# Patient Record
Sex: FEMALE | Race: WHITE | NOT HISPANIC OR LATINO | Employment: OTHER | URBAN - NONMETROPOLITAN AREA
[De-identification: names, ages, dates, MRNs, and addresses within clinical notes are randomized per-mention and may not be internally consistent; named-entity substitution may affect disease eponyms.]

---

## 2017-01-31 ENCOUNTER — TRANSCRIBE ORDERS (OUTPATIENT)
Dept: CT IMAGING | Facility: HOSPITAL | Age: 77
End: 2017-01-31

## 2017-01-31 DIAGNOSIS — Z13.9 SCREENING: Primary | ICD-10-CM

## 2017-03-20 ENCOUNTER — APPOINTMENT (OUTPATIENT)
Dept: CT IMAGING | Facility: HOSPITAL | Age: 77
End: 2017-03-20

## 2017-03-21 ENCOUNTER — TRANSCRIBE ORDERS (OUTPATIENT)
Dept: MRI IMAGING | Facility: HOSPITAL | Age: 77
End: 2017-03-21

## 2017-03-21 DIAGNOSIS — H49.02 THIRD NERVE PALSY OF LEFT EYE: Primary | ICD-10-CM

## 2017-03-27 ENCOUNTER — HOSPITAL ENCOUNTER (OUTPATIENT)
Dept: MRI IMAGING | Facility: HOSPITAL | Age: 77
Discharge: HOME | End: 2017-03-27
Attending: SPECIALIST | Admitting: SPECIALIST

## 2017-03-27 DIAGNOSIS — H49.02 THIRD NERVE PALSY OF LEFT EYE: ICD-10-CM

## 2017-03-27 PROCEDURE — 70553 MRI BRAIN STEM W/O & W/DYE: CPT

## 2017-03-27 PROCEDURE — 0 GADOBENATE DIMEGLUMINE 529 MG/ML SOLUTION: Performed by: SPECIALIST

## 2017-03-27 PROCEDURE — A9577 INJ MULTIHANCE: HCPCS | Performed by: SPECIALIST

## 2017-03-27 RX ADMIN — GADOBENATE DIMEGLUMINE 10 ML: 529 INJECTION, SOLUTION INTRAVENOUS at 13:54

## 2017-09-05 ENCOUNTER — TRANSCRIBE ORDERS (OUTPATIENT)
Dept: ADMINISTRATIVE | Facility: HOSPITAL | Age: 77
End: 2017-09-05

## 2017-09-05 DIAGNOSIS — Z78.0 ASYMPTOMATIC POSTMENOPAUSAL STATUS (AGE-RELATED) (NATURAL): Primary | ICD-10-CM

## 2017-09-07 ENCOUNTER — TRANSCRIBE ORDERS (OUTPATIENT)
Dept: ADMINISTRATIVE | Facility: HOSPITAL | Age: 77
End: 2017-09-07

## 2018-12-19 ENCOUNTER — TRANSCRIBE ORDERS (OUTPATIENT)
Dept: ADMINISTRATIVE | Facility: HOSPITAL | Age: 78
End: 2018-12-19

## 2018-12-19 DIAGNOSIS — Z78.0 POST-MENOPAUSAL: Primary | ICD-10-CM

## 2018-12-19 DIAGNOSIS — Z12.31 ENCOUNTER FOR SCREENING MAMMOGRAM FOR BREAST CANCER: ICD-10-CM

## 2019-02-25 ENCOUNTER — HOSPITAL ENCOUNTER (OUTPATIENT)
Dept: ULTRASOUND IMAGING | Facility: HOSPITAL | Age: 79
Discharge: HOME | End: 2019-02-25
Admitting: INTERNAL MEDICINE

## 2019-02-25 ENCOUNTER — TRANSCRIBE ORDERS (OUTPATIENT)
Dept: ADMINISTRATIVE | Facility: HOSPITAL | Age: 79
End: 2019-02-25

## 2019-02-25 DIAGNOSIS — M79.89 SWELLING, LIMB: ICD-10-CM

## 2019-02-25 DIAGNOSIS — M79.89 SWELLING, LIMB: Primary | ICD-10-CM

## 2019-02-25 PROCEDURE — 93970 EXTREMITY STUDY: CPT

## 2022-06-30 ENCOUNTER — OFFICE VISIT (OUTPATIENT)
Dept: PULMONOLOGY | Facility: CLINIC | Age: 82
End: 2022-06-30

## 2022-06-30 VITALS
DIASTOLIC BLOOD PRESSURE: 80 MMHG | BODY MASS INDEX: 15.33 KG/M2 | WEIGHT: 92 LBS | SYSTOLIC BLOOD PRESSURE: 119 MMHG | RESPIRATION RATE: 16 BRPM | OXYGEN SATURATION: 93 % | HEIGHT: 65 IN | HEART RATE: 64 BPM

## 2022-06-30 DIAGNOSIS — R06.02 SHORTNESS OF BREATH: ICD-10-CM

## 2022-06-30 DIAGNOSIS — J41.0 CHRONIC BRONCHITIS, SIMPLE: ICD-10-CM

## 2022-06-30 DIAGNOSIS — J44.9 CHRONIC OBSTRUCTIVE PULMONARY DISEASE, UNSPECIFIED COPD TYPE: ICD-10-CM

## 2022-06-30 DIAGNOSIS — F17.210 NICOTINE DEPENDENCE, CIGARETTES, UNCOMPLICATED: ICD-10-CM

## 2022-06-30 DIAGNOSIS — J47.9 BRONCHIECTASIS WITHOUT ACUTE EXACERBATION: ICD-10-CM

## 2022-06-30 DIAGNOSIS — R93.89 ABNORMAL CT OF THE CHEST: Primary | ICD-10-CM

## 2022-06-30 DIAGNOSIS — R06.02 SOB (SHORTNESS OF BREATH): Primary | ICD-10-CM

## 2022-06-30 PROCEDURE — 99204 OFFICE O/P NEW MOD 45 MIN: CPT | Performed by: INTERNAL MEDICINE

## 2022-06-30 PROCEDURE — 94060 EVALUATION OF WHEEZING: CPT | Performed by: INTERNAL MEDICINE

## 2022-06-30 PROCEDURE — 94729 DIFFUSING CAPACITY: CPT | Performed by: INTERNAL MEDICINE

## 2022-06-30 PROCEDURE — 95012 NITRIC OXIDE EXP GAS DETER: CPT | Performed by: INTERNAL MEDICINE

## 2022-06-30 PROCEDURE — 94726 PLETHYSMOGRAPHY LUNG VOLUMES: CPT | Performed by: INTERNAL MEDICINE

## 2022-06-30 RX ORDER — DOXAZOSIN 2 MG/1
TABLET ORAL
COMMUNITY

## 2022-06-30 RX ORDER — SIMVASTATIN 40 MG
1 TABLET ORAL
COMMUNITY
Start: 2022-04-14

## 2022-06-30 RX ORDER — ALBUTEROL SULFATE 90 UG/1
AEROSOL, METERED RESPIRATORY (INHALATION)
COMMUNITY
End: 2022-06-30

## 2022-06-30 RX ORDER — BUDESONIDE 0.5 MG/2ML
0.5 INHALANT ORAL 2 TIMES DAILY
Qty: 120 ML | Refills: 5 | Status: SHIPPED | OUTPATIENT
Start: 2022-06-30

## 2022-06-30 RX ORDER — AMLODIPINE BESYLATE 2.5 MG/1
TABLET ORAL
COMMUNITY
Start: 2022-04-26

## 2022-06-30 RX ORDER — LOSARTAN POTASSIUM 50 MG/1
TABLET ORAL
COMMUNITY
Start: 2022-04-06

## 2022-06-30 RX ORDER — DOXYCYCLINE 100 MG/1
100 CAPSULE ORAL 2 TIMES DAILY
COMMUNITY
Start: 2022-05-31 | End: 2022-09-02

## 2022-06-30 RX ORDER — BENZONATATE 200 MG/1
200 CAPSULE ORAL 3 TIMES DAILY PRN
COMMUNITY
Start: 2022-05-31

## 2022-06-30 RX ORDER — SOFT LENS DISINFECTANT
1 SOLUTION, NON-ORAL MISCELLANEOUS TAKE AS DIRECTED
Qty: 1 EACH | Refills: 0 | Status: SHIPPED | OUTPATIENT
Start: 2022-06-30

## 2022-06-30 RX ORDER — BUSPIRONE HYDROCHLORIDE 10 MG/1
TABLET ORAL
COMMUNITY
Start: 2022-02-18

## 2022-06-30 RX ORDER — IPRATROPIUM BROMIDE AND ALBUTEROL SULFATE 2.5; .5 MG/3ML; MG/3ML
3 SOLUTION RESPIRATORY (INHALATION) 4 TIMES DAILY PRN
Qty: 360 ML | Refills: 5 | Status: SHIPPED | OUTPATIENT
Start: 2022-06-30

## 2022-06-30 RX ORDER — CILOSTAZOL 50 MG/1
1 TABLET ORAL 2 TIMES DAILY
COMMUNITY
Start: 2022-02-14

## 2022-06-30 RX ORDER — METHYLPREDNISOLONE 4 MG/1
TABLET ORAL
COMMUNITY
Start: 2022-05-31 | End: 2022-09-02

## 2022-06-30 NOTE — PROGRESS NOTES
"  CONSULT NOTE    Requested by:   Leola Wright MD Higgins, Amy Martin, MD      Chief Complaint   Patient presents with   • Consult   • Breathing Problem       Subjective:  Tracey Nelson is a 81 y.o. female.     History of Present Illness   Patient comes in today for evaluation of shortness of breath. Patient says that for the past few years, she has been noticing that her exercise tolerance has been declining.     Patient also complains of some cough and phlegm production that occurs on most mornings out of the week, for most weeks out of the month, for the past few years.  She says that occasionally the sputum is extremely \"sticky\".  She however, denies having recurrent pneumonias although does remember having pneumonias in the past.    Patient used to smoke 1 pack per day for the past 40-45 years and is currently smoking 1 pack a day.     she mentions a few recent prescriptions for antibiotics / steroids for upper respiratory tract infection.     she is currently not on oxygen.     she does have a family history of chronic obstructive disease, in her sister who had lung cancer.     The following portions of the patient's history were reviewed and updated as appropriate: allergies, current medications, past family history, past medical history, past social history and past surgical history.    Review of Systems   HENT: Positive for sinus pressure.    Respiratory: Positive for shortness of breath.    Cardiovascular: Negative for palpitations.   Psychiatric/Behavioral: Positive for sleep disturbance.   All other systems reviewed and are negative.      Past Medical History:   Diagnosis Date   • Allergies    • Coronary artery disease    • Hyperlipidemia        Social History     Tobacco Use   • Smoking status: Current Some Day Smoker     Packs/day: 0.50     Years: 40.00     Pack years: 20.00     Types: Cigarettes   • Smokeless tobacco: Never Used   Substance Use Topics   • Alcohol use: Never " "        Objective:  Visit Vitals  /80   Pulse 64   Resp 16   Ht 165.1 cm (65\")   Wt 41.7 kg (92 lb)   SpO2 93%   BMI 15.31 kg/m²       Physical Exam  Vitals reviewed.   Constitutional:       Appearance: She is well-developed.   HENT:      Head:      Comments: No acute lesions noted     Nose:      Comments: Mild nasal erythema noted.     Mouth/Throat:      Mouth: Mucous membranes are moist.      Comments: Dentures noted.   Neck:      Vascular: No JVD.   Cardiovascular:      Rate and Rhythm: Normal rate and regular rhythm.   Pulmonary:      Effort: Pulmonary effort is normal.      Breath sounds: Wheezing and rhonchi (Left > right. ) present. No rales.      Comments: Somewhat hyperresonant to percussion.  Somewhat decreased air entry.  Mild scattered wheezing noted.   Musculoskeletal:      Cervical back: Neck supple.      Right lower leg: No edema.      Left lower leg: No edema.      Comments: Gait was normal.   Skin:     General: Skin is warm and dry.   Neurological:      Mental Status: She is alert and oriented to person, place, and time.   Psychiatric:         Mood and Affect: Mood normal.         Behavior: Behavior normal.         Assessment/Plan:  Diagnoses and all orders for this visit:    1. Abnormal CT of the chest (Primary)    2. Shortness of breath  -     Pulmonary Function Test  -     Nitric Oxide  -     CT Chest Hi Resolution Diagnostic; Future    3. Chronic obstructive pulmonary disease, unspecified COPD type (HCC)  -     Pulmonary Function Test  -     Nitric Oxide  -     CT Chest Hi Resolution Diagnostic; Future    4. Chronic bronchitis, simple (HCC)  -     Pulmonary Function Test  -     Nitric Oxide    5. Nicotine dependence, cigarettes, uncomplicated    6. Bronchiectasis without acute exacerbation (HCC)  -     CT Chest Hi Resolution Diagnostic; Future    Other orders  -     Nebulizer device; 1 Device Take As Directed.  Dispense: 1 each; Refill: 0  -     budesonide (Pulmicort) 0.5 MG/2ML nebulizer " solution; Take 2 mL by nebulization 2 (Two) Times a Day.  Dispense: 120 mL; Refill: 5  -     ipratropium-albuterol (DUO-NEB) 0.5-2.5 mg/3 ml nebulizer; Take 3 mL by nebulization 4 (Four) Times a Day As Needed for Wheezing or Shortness of Air.  Dispense: 360 mL; Refill: 5        Return in about 2 months (around 8/31/2022) for Recheck, Imaging, For Sonya Garcia), ...Also 6 mths w/Dee Dee, ....Also 11 mths w/ Dr. Ramirez.    DISCUSSION(if any):  Last CT scan was reviewed in great detail with the patient. Images reviewed personally.   CT of the chest was suggestive of left basilar bronchiectasis.  There was some atelectasis noted as well.    I have also reviewed her provider's office note that mentions dry cough, postnasal drip and some wheezing.     ===========================  ===========================    PFTs were reviewed.  Consistent with moderate obstruction and mild reduction in diffusion capacity.    FeNO level was <5 today.    Laboratory workup was also reviewed which showed carbon oxide level of 26.  Hemoglobin was 14.3 with absolute reasonable count of 100 or so.    ===========================  ===========================    Orders as above.    I have told the patient, that in my view, shortness of breath is most likely from underlying chronic obstructive lung disease/bronchiectasis.     Patient was given education and demonstration on how to use the medicine.     Side effects, of prescribed medicines, discussed.    Patient was also instructed on compliance and adherence with instructions.     I have discussed the need to quit smoking as soon as possible.    Patient was offered modalities such as Chantix/nicotine patches/Wellbutrin to aid in smoking cessation.    The patient will get back to us regarding the choice, once a decision has been taken.     I told the patient that her CT findings are suggestive of bronchiectasis therefore high-resolution CT scan has been requested.      I have given her  prescription for incentive spirometer with instructions to use it on a regular basis.    Postural drainage techniques were also discussed with the patient.    Based on the findings on the HRCT, we will consider further work-up, if clinically indicated.      I told the patient that given isolated left lower lobe bronchiectasis, bronchoscopy may need to be considered to rule out focal/local atypical infections as a cause.    The patient was extremely hesitant but was willing to discuss further after the results of HRCT are available.    Patient was asked to call with any concerns.     Patient will be followed clinically to assess for response to treatment and further recommendations will be made, based on response.        Dictated utilizing Dragon dictation.    This document was electronically signed by Valerie Ramirez MD on 06/30/22 at 14:46 EDT

## 2022-07-18 ENCOUNTER — HOSPITAL ENCOUNTER (OUTPATIENT)
Dept: CT IMAGING | Facility: HOSPITAL | Age: 82
Discharge: HOME | End: 2022-07-18
Admitting: INTERNAL MEDICINE

## 2022-07-18 DIAGNOSIS — J47.9 BRONCHIECTASIS WITHOUT ACUTE EXACERBATION: ICD-10-CM

## 2022-07-18 DIAGNOSIS — R06.02 SHORTNESS OF BREATH: ICD-10-CM

## 2022-07-18 DIAGNOSIS — J44.9 CHRONIC OBSTRUCTIVE PULMONARY DISEASE, UNSPECIFIED COPD TYPE: ICD-10-CM

## 2022-07-18 PROCEDURE — 71250 CT THORAX DX C-: CPT

## 2022-08-02 DIAGNOSIS — R93.89 ABNORMAL CT OF THE CHEST: Primary | ICD-10-CM

## 2022-08-17 ENCOUNTER — HOSPITAL ENCOUNTER (OUTPATIENT)
Dept: CT IMAGING | Facility: HOSPITAL | Age: 82
Discharge: HOME | End: 2022-08-17
Admitting: INTERNAL MEDICINE

## 2022-08-17 DIAGNOSIS — R93.89 ABNORMAL CT OF THE CHEST: ICD-10-CM

## 2022-08-17 PROCEDURE — 71250 CT THORAX DX C-: CPT

## 2022-09-02 ENCOUNTER — OFFICE VISIT (OUTPATIENT)
Dept: PULMONOLOGY | Facility: CLINIC | Age: 82
End: 2022-09-02

## 2022-09-02 VITALS
HEART RATE: 71 BPM | DIASTOLIC BLOOD PRESSURE: 52 MMHG | OXYGEN SATURATION: 96 % | SYSTOLIC BLOOD PRESSURE: 116 MMHG | HEIGHT: 65 IN | BODY MASS INDEX: 15.16 KG/M2 | WEIGHT: 91 LBS

## 2022-09-02 DIAGNOSIS — R05.3 CHRONIC COUGH: ICD-10-CM

## 2022-09-02 DIAGNOSIS — R93.89 ABNORMAL CT OF THE CHEST: ICD-10-CM

## 2022-09-02 DIAGNOSIS — J44.9 CHRONIC OBSTRUCTIVE PULMONARY DISEASE, UNSPECIFIED COPD TYPE: Primary | ICD-10-CM

## 2022-09-02 PROCEDURE — 99214 OFFICE O/P EST MOD 30 MIN: CPT | Performed by: NURSE PRACTITIONER

## 2022-09-02 RX ORDER — MIRTAZAPINE 15 MG/1
TABLET, FILM COATED ORAL
COMMUNITY
Start: 2022-08-11

## 2022-09-02 RX ORDER — BUDESONIDE 0.5 MG/2ML
2 INHALANT ORAL EVERY 12 HOURS SCHEDULED
COMMUNITY

## 2022-09-02 NOTE — PROGRESS NOTES
"     Follow Up Office Visit      Patient Name: Tracey Nelson    Chief Complaint:    Chief Complaint   Patient presents with   • Follow-up   • Shortness of Breath     Patient states she has had a cough that has been persistent for a couple of months        History of Present Illness: Tracey Nelson is a 81 y.o. female who is here today for follow up of COPD. Since last visit, her dyspnea has been stable and is not lifestyle limiting for her.  She notes that she is able to do yard work and cleaning her house without difficulty.  She is most bothered by a cough for 6 months duration, which is worse at nighttime and is productive of light yellow sputum.  No difficulty in clearing secretions.  No fevers, no hemoptysis.  She is still smoking some and has been thinking about quitting. She notes that her weight has dwindled from 130s lb over the past 3-4 years. +fatigue - her cough \"takes everything out\" of her. She declines to consider bronchoscopy at this time.    Supplemental Oxygen: No    Subjective      Review of Systems:  Review of Systems   Constitutional: Positive for fatigue and unexpected weight change. Negative for fever.   Respiratory: Positive for cough and shortness of breath. Negative for wheezing.    Cardiovascular: Negative for chest pain and leg swelling.        Past Medical History:   Past Medical History:   Diagnosis Date   • Allergies    • Coronary artery disease    • Hyperlipidemia        Past Surgical History:   Past Surgical History:   Procedure Laterality Date   • CAROTID STENT     • TONSILLECTOMY         Family History: History reviewed. No pertinent family history.    Social History:   Social History     Socioeconomic History   • Marital status:    Tobacco Use   • Smoking status: Current Some Day Smoker     Packs/day: 0.50     Years: 40.00     Pack years: 20.00     Types: Cigarettes   • Smokeless tobacco: Never Used   Vaping Use   • Vaping Use: Never used   Substance and Sexual Activity "   • Alcohol use: Never       Current Medications:     Current Outpatient Medications:   •  amLODIPine (NORVASC) 2.5 MG tablet, amlodipine 2.5 mg tablet  Take 1 tablet every day by oral route in the evening., Disp: , Rfl:   •  Aspirin 81 MG capsule, aspirin 80 mg tablet  Take 1 tablet every day by oral route., Disp: , Rfl:   •  azithromycin (ZITHROMAX) 250 MG tablet, Take 2 tablets the first day then 1 tablet a day for 4 days thereafter., Disp: 6 tablet, Rfl: 0  •  benzonatate (TESSALON) 200 MG capsule, Take 200 mg by mouth 3 (Three) Times a Day As Needed., Disp: , Rfl:   •  budesonide (Pulmicort) 0.5 MG/2ML nebulizer solution, Take 2 mL by nebulization 2 (Two) Times a Day., Disp: 120 mL, Rfl: 5  •  budesonide (PULMICORT) 0.5 MG/2ML nebulizer solution, 2 mL Every 12 (Twelve) Hours., Disp: , Rfl:   •  busPIRone (BUSPAR) 10 MG tablet, buspirone 10 mg tablet  Two times a day as needed, Disp: , Rfl:   •  Cholecalciferol 50 MCG (2000 UT) tablet, Vitamin D3 50 mcg (2,000 unit) tablet  Take 1 tablet every day by oral route for 30 days., Disp: , Rfl:   •  cilostazol (PLETAL) 50 MG tablet, cilostazol 50 mg tablet  To take 1 tablet BID, Disp: , Rfl:   •  cilostazol (PLETAL) 50 MG tablet, Take 1 tablet by mouth 2 (Two) Times a Day., Disp: , Rfl:   •  cyanocobalamin (VITAMIN B-12) 250 MCG tablet, Vitamin B-12 250 mcg tablet  Take 1 Daily, Disp: , Rfl:   •  doxazosin (CARDURA) 2 MG tablet, Take 1 tablet(s) every day by oral route for 90 days., Disp: , Rfl:   •  ipratropium-albuterol (DUO-NEB) 0.5-2.5 mg/3 ml nebulizer, Take 3 mL by nebulization 4 (Four) Times a Day As Needed for Wheezing or Shortness of Air., Disp: 360 mL, Rfl: 5  •  lisinopril (PRINIVIL,ZESTRIL) 30 MG tablet, , Disp: , Rfl:   •  losartan (COZAAR) 50 MG tablet, losartan 50 mg tablet  Take 1 tablet every day by oral route for 90 days., Disp: , Rfl:   •  mirtazapine (REMERON) 15 MG tablet, , Disp: , Rfl:   •  Nebulizer device, 1 Device Take As Directed., Disp: 1  "each, Rfl: 0  •  simvastatin (ZOCOR) 40 MG tablet, Take 1 tablet by mouth every night at bedtime., Disp: , Rfl:   •  vitamin B-12 (CYANOCOBALAMIN) 100 MCG tablet, Vitamin B-12 250 mcg tablet  Take 1 Daily, Disp: , Rfl:   •  Cholecalciferol (Vitamin D3) 125 MCG (5000 UT) tablet dispersible, Vitamin D3 2,000 unit tablet  Take 1 tablet every day by oral route for 30 days., Disp: , Rfl:   •  simvastatin (ZOCOR) 40 MG tablet, , Disp: , Rfl:      Allergies:   No Known Allergies    Objective     Physical Exam:  Vital Signs:   Vitals:    09/02/22 1308   BP: 116/52   Pulse: 71   SpO2: 96%   Weight: 41.3 kg (91 lb)   Height: 165.1 cm (65\")     Body mass index is 15.14 kg/m².    Physical Exam  Constitutional:       General: She is not in acute distress.     Appearance: She is not toxic-appearing.      Comments: Underweight   HENT:      Head: Normocephalic and atraumatic.   Eyes:      Extraocular Movements: Extraocular movements intact.   Cardiovascular:      Rate and Rhythm: Normal rate and regular rhythm.      Heart sounds: Normal heart sounds.   Pulmonary:      Effort: Pulmonary effort is normal.      Breath sounds: Normal breath sounds.   Abdominal:      General: There is no distension.   Musculoskeletal:         General: No swelling.      Cervical back: Neck supple.   Skin:     General: Skin is warm and dry.      Findings: No rash.   Neurological:      General: No focal deficit present.      Mental Status: She is alert and oriented to person, place, and time.   Psychiatric:         Mood and Affect: Mood normal.         Behavior: Behavior normal.       Results Review:   July 2022 high resolution chest CT scan showed no evidence of diffuse interstitial lung disease. No evidence of bronchiectasis. Left lower lobe parenchymal opacity could be neoplastic or inflammatory.    August 2022 chest CT scan showed persistent parenchymal opacity of the left lower lobe which could be due to a postobstructive process given opacification " of the  left lower lobe bronchus. There is likely postobstructive pneumonia, mildly improved.    Assessment / Plan      Assessment/Plan:   Diagnoses and all orders for this visit:    1. Chronic obstructive pulmonary disease, unspecified COPD type (HCC) (Primary)  Currently using nebulizer treatments. Will have her trial sample inhalers of Anoro in place of nebs for the duration of the samples and then can resume her usual maintenance neb regimen as she has a couple of boxes neb medication at home. Will determine at next visit if patient would like to try transitioning from neb treatments to Anoro or other similar inhaler. Appropriate inhaler technique demonstrated today in clinic.     2. Abnormal CT of the chest  LLL abnormality which appears overall stable between recent imaging reports. More remote reports have been requested to assess duration of the abnormality. Discussed bronchoscopy for further evaluation though patient declines to consider a scope at this time.    3. Chronic cough  -     Respiratory Culture - Sputum, Cough; Future  -     AFB Culture - Sputum, Cough; Future  -     Fungus Culture - Sputum, Cough; Future  ?MAC given constellation of symptoms. Check sputum studies as noted above.     Follow Up:   January 2023 as previously scheduled  The patient was counseled on diagnostic results, risks and benefits of treatment options, risk factor modifications and the importance of treatment compliance. The patient was advised to contact the clinic with concerns or worsening symptoms.     KADE Powers   Pulmonary Medicine [place]     Please note that portions of this note may have been completed with a voice recognition program. Efforts were made to edit the dictations, but occasionally words are mistranscribed

## 2022-10-16 ENCOUNTER — APPOINTMENT (OUTPATIENT)
Dept: CT IMAGING | Facility: HOSPITAL | Age: 82
End: 2022-10-16

## 2022-10-16 ENCOUNTER — APPOINTMENT (OUTPATIENT)
Dept: GENERAL RADIOLOGY | Facility: HOSPITAL | Age: 82
End: 2022-10-16

## 2022-10-16 ENCOUNTER — HOSPITAL ENCOUNTER (EMERGENCY)
Facility: HOSPITAL | Age: 82
Discharge: HOME | End: 2022-10-16
Attending: FAMILY MEDICINE | Admitting: FAMILY MEDICINE

## 2022-10-16 VITALS
HEIGHT: 61 IN | TEMPERATURE: 97.7 F | DIASTOLIC BLOOD PRESSURE: 73 MMHG | HEART RATE: 85 BPM | OXYGEN SATURATION: 93 % | WEIGHT: 95 LBS | BODY MASS INDEX: 17.94 KG/M2 | SYSTOLIC BLOOD PRESSURE: 156 MMHG | RESPIRATION RATE: 18 BRPM

## 2022-10-16 DIAGNOSIS — I10 UNCONTROLLED HYPERTENSION: Primary | ICD-10-CM

## 2022-10-16 LAB
ALBUMIN SERPL-MCNC: 4.2 G/DL (ref 3.5–5.2)
ALBUMIN/GLOB SERPL: 1.6 G/DL
ALP SERPL-CCNC: 76 U/L (ref 39–117)
ALT SERPL W P-5'-P-CCNC: 10 U/L (ref 1–33)
ANION GAP SERPL CALCULATED.3IONS-SCNC: 8 MMOL/L (ref 5–15)
AST SERPL-CCNC: 17 U/L (ref 1–32)
BASOPHILS # BLD AUTO: 0.06 10*3/MM3 (ref 0–0.2)
BASOPHILS NFR BLD AUTO: 0.7 % (ref 0–1.5)
BILIRUB SERPL-MCNC: 0.2 MG/DL (ref 0–1.2)
BILIRUB UR QL STRIP: NEGATIVE
BUN SERPL-MCNC: 14 MG/DL (ref 8–23)
BUN/CREAT SERPL: 16.7 (ref 7–25)
CALCIUM SPEC-SCNC: 10.1 MG/DL (ref 8.6–10.5)
CHLORIDE SERPL-SCNC: 102 MMOL/L (ref 98–107)
CLARITY UR: CLEAR
CO2 SERPL-SCNC: 28 MMOL/L (ref 22–29)
COLOR UR: YELLOW
CREAT SERPL-MCNC: 0.84 MG/DL (ref 0.57–1)
CRP SERPL-MCNC: <0.3 MG/DL (ref 0–0.5)
DEPRECATED RDW RBC AUTO: 47 FL (ref 37–54)
EGFRCR SERPLBLD CKD-EPI 2021: 69.5 ML/MIN/1.73
EOSINOPHIL # BLD AUTO: 0.16 10*3/MM3 (ref 0–0.4)
EOSINOPHIL NFR BLD AUTO: 2 % (ref 0.3–6.2)
ERYTHROCYTE [DISTWIDTH] IN BLOOD BY AUTOMATED COUNT: 13 % (ref 12.3–15.4)
GLOBULIN UR ELPH-MCNC: 2.6 GM/DL
GLUCOSE SERPL-MCNC: 123 MG/DL (ref 65–99)
GLUCOSE UR STRIP-MCNC: NEGATIVE MG/DL
HCT VFR BLD AUTO: 42.3 % (ref 34–46.6)
HGB BLD-MCNC: 13.9 G/DL (ref 12–15.9)
HGB UR QL STRIP.AUTO: NEGATIVE
HOLD SPECIMEN: NORMAL
HOLD SPECIMEN: NORMAL
IMM GRANULOCYTES # BLD AUTO: 0.01 10*3/MM3 (ref 0–0.05)
IMM GRANULOCYTES NFR BLD AUTO: 0.1 % (ref 0–0.5)
KETONES UR QL STRIP: NEGATIVE
LEUKOCYTE ESTERASE UR QL STRIP.AUTO: NEGATIVE
LYMPHOCYTES # BLD AUTO: 2.65 10*3/MM3 (ref 0.7–3.1)
LYMPHOCYTES NFR BLD AUTO: 33 % (ref 19.6–45.3)
MCH RBC QN AUTO: 32.6 PG (ref 26.6–33)
MCHC RBC AUTO-ENTMCNC: 32.9 G/DL (ref 31.5–35.7)
MCV RBC AUTO: 99.1 FL (ref 79–97)
MONOCYTES # BLD AUTO: 0.93 10*3/MM3 (ref 0.1–0.9)
MONOCYTES NFR BLD AUTO: 11.6 % (ref 5–12)
NEUTROPHILS NFR BLD AUTO: 4.23 10*3/MM3 (ref 1.7–7)
NEUTROPHILS NFR BLD AUTO: 52.6 % (ref 42.7–76)
NITRITE UR QL STRIP: NEGATIVE
NRBC BLD AUTO-RTO: 0 /100 WBC (ref 0–0.2)
NT-PROBNP SERPL-MCNC: 403.7 PG/ML (ref 0–1800)
PH UR STRIP.AUTO: 7.5 [PH] (ref 5–8)
PLATELET # BLD AUTO: 178 10*3/MM3 (ref 140–450)
PMV BLD AUTO: 10.4 FL (ref 6–12)
POTASSIUM SERPL-SCNC: 4.1 MMOL/L (ref 3.5–5.2)
PROT SERPL-MCNC: 6.8 G/DL (ref 6–8.5)
PROT UR QL STRIP: NEGATIVE
RBC # BLD AUTO: 4.27 10*6/MM3 (ref 3.77–5.28)
SODIUM SERPL-SCNC: 138 MMOL/L (ref 136–145)
SP GR UR STRIP: <=1.005 (ref 1–1.03)
TROPONIN T SERPL-MCNC: <0.01 NG/ML (ref 0–0.03)
TROPONIN T SERPL-MCNC: <0.01 NG/ML (ref 0–0.03)
UROBILINOGEN UR QL STRIP: NORMAL
WBC NRBC COR # BLD: 8.04 10*3/MM3 (ref 3.4–10.8)
WHOLE BLOOD HOLD COAG: NORMAL
WHOLE BLOOD HOLD SPECIMEN: NORMAL

## 2022-10-16 PROCEDURE — 80053 COMPREHEN METABOLIC PANEL: CPT | Performed by: FAMILY MEDICINE

## 2022-10-16 PROCEDURE — 83880 ASSAY OF NATRIURETIC PEPTIDE: CPT | Performed by: FAMILY MEDICINE

## 2022-10-16 PROCEDURE — 84484 ASSAY OF TROPONIN QUANT: CPT | Performed by: FAMILY MEDICINE

## 2022-10-16 PROCEDURE — 93005 ELECTROCARDIOGRAM TRACING: CPT | Performed by: FAMILY MEDICINE

## 2022-10-16 PROCEDURE — 70450 CT HEAD/BRAIN W/O DYE: CPT

## 2022-10-16 PROCEDURE — 86140 C-REACTIVE PROTEIN: CPT | Performed by: FAMILY MEDICINE

## 2022-10-16 PROCEDURE — 99284 EMERGENCY DEPT VISIT MOD MDM: CPT

## 2022-10-16 PROCEDURE — 85025 COMPLETE CBC W/AUTO DIFF WBC: CPT | Performed by: FAMILY MEDICINE

## 2022-10-16 PROCEDURE — 71045 X-RAY EXAM CHEST 1 VIEW: CPT

## 2022-10-16 PROCEDURE — 81003 URINALYSIS AUTO W/O SCOPE: CPT | Performed by: FAMILY MEDICINE

## 2022-10-16 PROCEDURE — 36415 COLL VENOUS BLD VENIPUNCTURE: CPT

## 2022-10-16 RX ORDER — SODIUM CHLORIDE 0.9 % (FLUSH) 0.9 %
10 SYRINGE (ML) INJECTION AS NEEDED
Status: DISCONTINUED | OUTPATIENT
Start: 2022-10-16 | End: 2022-10-16 | Stop reason: HOSPADM

## 2022-10-16 NOTE — DISCHARGE INSTRUCTIONS
Take your medications as prescribed  Monitor your BP and heart rate on log Morning; lunch and before bed  Call PCP on Monday for appointment and review BP findings and see if they wish to adjust your medications  Return to the ED for any worsening or concerning symptoms immediately

## 2022-10-16 NOTE — ED PROVIDER NOTES
Subjective   History of Present Illness  81-year-old female past medical history of hypertension, coronary artery disease, peripheral vascular disease, hyperlipidemia, and COPD presents to the emergency department reporting of not feeling well all day on Saturday.  Patient reports that she felt bad and just did not have any energy so she thought maybe I should check my blood pressure and when she did it was elevated at 180/96.  Patient reports that she rechecked it and it was still elevated she took her nighttime medicine and it continued to stay elevated patient reports that she has just not felt good all day and was talking to her family and they told her that she should just come in and be evaluated.  Patient denies any chest pain denies any shortness of breath.  Patient reports she has had a cough but she has been dealing with that for several months now.  Denies any fever chills or nausea or vomiting.    History provided by:  Patient  Hypertension  Severity:  Moderate  Onset quality:  Gradual  Timing:  Intermittent  Progression:  Worsening  Chronicity:  New  Notable PTA blood pressures:  200/96  Relieved by:  Nothing  Worsened by:  Nothing  Ineffective treatments:  Medication  Associated symptoms: anxiety    Associated symptoms: no abdominal pain, no chest pain and no fever    Risk factors: cardiac disease and tobacco use        Review of Systems   Constitutional: Negative.  Negative for fever.   HENT: Negative.    Respiratory: Negative.    Cardiovascular: Negative.  Negative for chest pain.   Gastrointestinal: Negative.  Negative for abdominal pain.   Endocrine: Negative.    Genitourinary: Negative.  Negative for dysuria.   Skin: Negative.    Neurological: Negative.    Psychiatric/Behavioral: The patient is nervous/anxious.    All other systems reviewed and are negative.      Past Medical History:   Diagnosis Date   • Allergies    • Coronary artery disease    • Hyperlipidemia    • LBBB (left bundle branch  block) 2018   • Myocardial infarction (HCC)    • Stroke (HCC)        No Known Allergies    Past Surgical History:   Procedure Laterality Date   • CAROTID STENT     • TONSILLECTOMY         History reviewed. No pertinent family history.    Social History     Socioeconomic History   • Marital status:    Tobacco Use   • Smoking status: Some Days     Packs/day: 0.50     Years: 40.00     Pack years: 20.00     Types: Cigarettes   • Smokeless tobacco: Never   Vaping Use   • Vaping Use: Never used   Substance and Sexual Activity   • Alcohol use: Never           Objective   Physical Exam  Vitals and nursing note reviewed.   Constitutional:       Appearance: Normal appearance.   HENT:      Head: Normocephalic and atraumatic.      Right Ear: Tympanic membrane normal.      Left Ear: Tympanic membrane normal.      Nose: Nose normal.      Mouth/Throat:      Mouth: Mucous membranes are moist.   Eyes:      Extraocular Movements: Extraocular movements intact.      Pupils: Pupils are equal, round, and reactive to light.   Cardiovascular:      Rate and Rhythm: Normal rate and regular rhythm.      Pulses: Normal pulses.   Pulmonary:      Effort: Pulmonary effort is normal.   Abdominal:      General: Abdomen is flat.      Palpations: Abdomen is soft.   Musculoskeletal:         General: Normal range of motion.      Cervical back: Normal range of motion.   Skin:     General: Skin is warm.      Capillary Refill: Capillary refill takes less than 2 seconds.   Neurological:      General: No focal deficit present.      Mental Status: She is alert and oriented to person, place, and time.   Psychiatric:         Mood and Affect: Mood normal.         Behavior: Behavior normal.         Thought Content: Thought content normal.         Judgment: Judgment normal.         Procedures           ED Course  ED Course as of 10/16/22 0636   Sun Oct 16, 2022   0256 EKG interpretation time is 2:40 AM sinus rhythm 71 bpm there is a left bundle branch  block noted QRS duration is 126 QT is 386 QTC is 409 is the abnormal EKG there are no comparable EKGs in our computer system. [MH]   0427 EKG interpretation is 4:45 AM sinus rhythm 84 bpm QRS duration is 120 QT is 392 QTC is 432 there is a left bundle branch block present there is no acute ST elevation still consistent with an abnormal EKG. [MH]   0549 Reviewed EMR which reveals  4/6/22 - LBBB is old it was diagnosed in 2018 []      ED Course User Index  [MH] Yasmine Leija,                                            MDM  Number of Diagnoses or Management Options  Uncontrolled hypertension: new and requires workup  Diagnosis management comments: 81-year-old female presents the emergency department with elevated blood pressure.  Patient states that she is felt bad all day had blood pressures with the systolic being 200 and the diastolic being in the 90s.  Patient reports compliance with her blood pressure medication.  Patient denies any chest pain or any other symptoms.  Patient's work-up reveals a left bundle branch block on her EKGs up until review of the medical record to was unclear if it was new or old however on the EMR it shows that she was diagnosed with a left bundle branch block back in 2018.  Patient's troponin are negative x2 patient's CBC and CMP are unremarkable patient's chest x-ray is negative for any acute process.  Patient's ED stay has been unremarkable patient is alert and oriented her CT scan shows no acute findings.  Patient remains asymptomatic her blood pressure has improved advised her to follow-up with her PCP in 24 to 48 hours advised her to keep a blood pressure log to give to her PCP so they can adjust her blood pressure medications.  Also advised her that if she develops any chest pain or any concerning symptoms to return to the ER immediately also discussed tobacco cessation.  Patient verbalized understanding is in agreement with her treatment plan.       Amount and/or  Complexity of Data Reviewed  Clinical lab tests: reviewed and ordered  Tests in the radiology section of CPT®: reviewed and ordered  Tests in the medicine section of CPT®: reviewed and ordered  Decide to obtain previous medical records or to obtain history from someone other than the patient: yes  Review and summarize past medical records: yes  Independent visualization of images, tracings, or specimens: yes        Final diagnoses:   Uncontrolled hypertension       ED Disposition  ED Disposition     ED Disposition   Discharge    Condition   Stable    Comment   --             Leola Wright MD  [address] [facility] [address]  [phone]    Schedule an appointment as soon as possible for a visit in 2 days      [facility] Emergency Department  [address] [address] [phone]    If symptoms worsen         Medication List      No changes were made to your prescriptions during this visit.          Yasmine Leija DO  10/16/22 0636

## 2022-11-27 ENCOUNTER — HOSPITAL ENCOUNTER (EMERGENCY)
Facility: HOSPITAL | Age: 82
Discharge: HOME | End: 2022-11-27
Attending: EMERGENCY MEDICINE | Admitting: EMERGENCY MEDICINE

## 2022-11-27 ENCOUNTER — APPOINTMENT (OUTPATIENT)
Dept: ULTRASOUND IMAGING | Facility: HOSPITAL | Age: 82
End: 2022-11-27

## 2022-11-27 ENCOUNTER — APPOINTMENT (OUTPATIENT)
Dept: GENERAL RADIOLOGY | Facility: HOSPITAL | Age: 82
End: 2022-11-27

## 2022-11-27 VITALS
DIASTOLIC BLOOD PRESSURE: 68 MMHG | BODY MASS INDEX: 15.49 KG/M2 | WEIGHT: 93 LBS | OXYGEN SATURATION: 96 % | SYSTOLIC BLOOD PRESSURE: 148 MMHG | RESPIRATION RATE: 16 BRPM | HEART RATE: 70 BPM | HEIGHT: 65 IN | TEMPERATURE: 97.9 F

## 2022-11-27 DIAGNOSIS — L03.115 CELLULITIS OF RIGHT LOWER EXTREMITY: Primary | ICD-10-CM

## 2022-11-27 LAB
ALBUMIN SERPL-MCNC: 3.4 G/DL (ref 3.5–5.2)
ALBUMIN/GLOB SERPL: 1.3 G/DL
ALP SERPL-CCNC: 67 U/L (ref 39–117)
ALT SERPL W P-5'-P-CCNC: 12 U/L (ref 1–33)
ANION GAP SERPL CALCULATED.3IONS-SCNC: 8.8 MMOL/L (ref 5–15)
AST SERPL-CCNC: 13 U/L (ref 1–32)
BASOPHILS # BLD AUTO: 0.02 10*3/MM3 (ref 0–0.2)
BASOPHILS NFR BLD AUTO: 0.2 % (ref 0–1.5)
BILIRUB SERPL-MCNC: 0.4 MG/DL (ref 0–1.2)
BUN SERPL-MCNC: 17 MG/DL (ref 8–23)
BUN/CREAT SERPL: 27.4 (ref 7–25)
CALCIUM SPEC-SCNC: 9.1 MG/DL (ref 8.6–10.5)
CHLORIDE SERPL-SCNC: 99 MMOL/L (ref 98–107)
CO2 SERPL-SCNC: 25.2 MMOL/L (ref 22–29)
CREAT SERPL-MCNC: 0.62 MG/DL (ref 0.57–1)
CRP SERPL-MCNC: 5.01 MG/DL (ref 0–0.5)
DEPRECATED RDW RBC AUTO: 47.5 FL (ref 37–54)
EGFRCR SERPLBLD CKD-EPI 2021: 88.5 ML/MIN/1.73
EOSINOPHIL # BLD AUTO: 0.02 10*3/MM3 (ref 0–0.4)
EOSINOPHIL NFR BLD AUTO: 0.2 % (ref 0.3–6.2)
ERYTHROCYTE [DISTWIDTH] IN BLOOD BY AUTOMATED COUNT: 13.2 % (ref 12.3–15.4)
ERYTHROCYTE [SEDIMENTATION RATE] IN BLOOD: 30 MM/HR (ref 0–20)
GLOBULIN UR ELPH-MCNC: 2.7 GM/DL
GLUCOSE SERPL-MCNC: 191 MG/DL (ref 65–99)
HCT VFR BLD AUTO: 39.3 % (ref 34–46.6)
HGB BLD-MCNC: 13.1 G/DL (ref 12–15.9)
IMM GRANULOCYTES # BLD AUTO: 0.05 10*3/MM3 (ref 0–0.05)
IMM GRANULOCYTES NFR BLD AUTO: 0.5 % (ref 0–0.5)
LYMPHOCYTES # BLD AUTO: 0.96 10*3/MM3 (ref 0.7–3.1)
LYMPHOCYTES NFR BLD AUTO: 9.7 % (ref 19.6–45.3)
MCH RBC QN AUTO: 32.3 PG (ref 26.6–33)
MCHC RBC AUTO-ENTMCNC: 33.3 G/DL (ref 31.5–35.7)
MCV RBC AUTO: 96.8 FL (ref 79–97)
MONOCYTES # BLD AUTO: 1.06 10*3/MM3 (ref 0.1–0.9)
MONOCYTES NFR BLD AUTO: 10.7 % (ref 5–12)
NEUTROPHILS NFR BLD AUTO: 7.81 10*3/MM3 (ref 1.7–7)
NEUTROPHILS NFR BLD AUTO: 78.7 % (ref 42.7–76)
NRBC BLD AUTO-RTO: 0 /100 WBC (ref 0–0.2)
PLATELET # BLD AUTO: 159 10*3/MM3 (ref 140–450)
PMV BLD AUTO: 9.9 FL (ref 6–12)
POTASSIUM SERPL-SCNC: 3.6 MMOL/L (ref 3.5–5.2)
PROT SERPL-MCNC: 6.1 G/DL (ref 6–8.5)
RBC # BLD AUTO: 4.06 10*6/MM3 (ref 3.77–5.28)
SODIUM SERPL-SCNC: 133 MMOL/L (ref 136–145)
WBC NRBC COR # BLD: 9.92 10*3/MM3 (ref 3.4–10.8)

## 2022-11-27 PROCEDURE — 93971 EXTREMITY STUDY: CPT

## 2022-11-27 PROCEDURE — 86140 C-REACTIVE PROTEIN: CPT | Performed by: EMERGENCY MEDICINE

## 2022-11-27 PROCEDURE — 93926 LOWER EXTREMITY STUDY: CPT

## 2022-11-27 PROCEDURE — 80053 COMPREHEN METABOLIC PANEL: CPT | Performed by: EMERGENCY MEDICINE

## 2022-11-27 PROCEDURE — 99284 EMERGENCY DEPT VISIT MOD MDM: CPT

## 2022-11-27 PROCEDURE — 73610 X-RAY EXAM OF ANKLE: CPT

## 2022-11-27 PROCEDURE — 85651 RBC SED RATE NONAUTOMATED: CPT | Performed by: EMERGENCY MEDICINE

## 2022-11-27 PROCEDURE — 85025 COMPLETE CBC W/AUTO DIFF WBC: CPT | Performed by: EMERGENCY MEDICINE

## 2022-11-27 RX ORDER — CLINDAMYCIN HYDROCHLORIDE 150 MG/1
450 CAPSULE ORAL 3 TIMES DAILY
Qty: 63 CAPSULE | Refills: 0 | Status: SHIPPED | OUTPATIENT
Start: 2022-11-27 | End: 2022-12-04

## 2022-11-27 RX ORDER — CLINDAMYCIN HYDROCHLORIDE 150 MG/1
450 CAPSULE ORAL ONCE
Status: COMPLETED | OUTPATIENT
Start: 2022-11-27 | End: 2022-11-27

## 2022-11-27 RX ORDER — HYDROCODONE BITARTRATE AND ACETAMINOPHEN 5; 325 MG/1; MG/1
1 TABLET ORAL ONCE
Status: COMPLETED | OUTPATIENT
Start: 2022-11-27 | End: 2022-11-27

## 2022-11-27 RX ORDER — SODIUM CHLORIDE 0.9 % (FLUSH) 0.9 %
10 SYRINGE (ML) INJECTION AS NEEDED
Status: DISCONTINUED | OUTPATIENT
Start: 2022-11-27 | End: 2022-11-27 | Stop reason: HOSPADM

## 2022-11-27 RX ADMIN — HYDROCODONE BITARTRATE AND ACETAMINOPHEN 1 TABLET: 5; 325 TABLET ORAL at 12:52

## 2022-11-27 RX ADMIN — CLINDAMYCIN HYDROCHLORIDE 450 MG: 150 CAPSULE ORAL at 18:06

## 2022-12-30 RX ORDER — UMECLIDINIUM BROMIDE AND VILANTEROL TRIFENATATE 62.5; 25 UG/1; UG/1
1 POWDER RESPIRATORY (INHALATION)
Qty: 90 EACH | Refills: 2 | Status: SHIPPED | OUTPATIENT
Start: 2022-12-30

## 2023-08-30 ENCOUNTER — PREP FOR SURGERY (OUTPATIENT)
Dept: OTHER | Facility: HOSPITAL | Age: 83
End: 2023-08-30
Payer: MEDICARE

## 2023-08-30 ENCOUNTER — OFFICE VISIT (OUTPATIENT)
Dept: PULMONOLOGY | Facility: CLINIC | Age: 83
End: 2023-08-30
Payer: MEDICARE

## 2023-08-30 VITALS
HEART RATE: 71 BPM | DIASTOLIC BLOOD PRESSURE: 65 MMHG | WEIGHT: 91.6 LBS | HEIGHT: 64 IN | OXYGEN SATURATION: 95 % | BODY MASS INDEX: 15.64 KG/M2 | RESPIRATION RATE: 18 BRPM | SYSTOLIC BLOOD PRESSURE: 120 MMHG

## 2023-08-30 DIAGNOSIS — R93.89 ABNORMAL CT OF THE CHEST: Primary | ICD-10-CM

## 2023-08-30 DIAGNOSIS — R05.3 CHRONIC COUGH: ICD-10-CM

## 2023-08-30 DIAGNOSIS — R93.89 ABNORMAL CT OF THE CHEST: ICD-10-CM

## 2023-08-30 DIAGNOSIS — J44.9 CHRONIC OBSTRUCTIVE PULMONARY DISEASE, UNSPECIFIED COPD TYPE: Primary | ICD-10-CM

## 2023-08-30 DIAGNOSIS — F17.210 NICOTINE DEPENDENCE, CIGARETTES, UNCOMPLICATED: ICD-10-CM

## 2023-08-30 PROCEDURE — 99214 OFFICE O/P EST MOD 30 MIN: CPT | Performed by: INTERNAL MEDICINE

## 2023-08-30 RX ORDER — SODIUM CHLORIDE 0.9 % (FLUSH) 0.9 %
10 SYRINGE (ML) INJECTION EVERY 12 HOURS SCHEDULED
OUTPATIENT
Start: 2023-08-30

## 2023-08-30 RX ORDER — FLUTICASONE FUROATE, UMECLIDINIUM BROMIDE AND VILANTEROL TRIFENATATE 200; 62.5; 25 UG/1; UG/1; UG/1
POWDER RESPIRATORY (INHALATION)
COMMUNITY

## 2023-08-30 RX ORDER — IPRATROPIUM BROMIDE AND ALBUTEROL SULFATE 2.5; .5 MG/3ML; MG/3ML
3 SOLUTION RESPIRATORY (INHALATION) 4 TIMES DAILY PRN
Qty: 360 ML | Refills: 5 | Status: SHIPPED | OUTPATIENT
Start: 2023-08-30

## 2023-08-30 RX ORDER — SODIUM CHLORIDE 9 MG/ML
42 INJECTION, SOLUTION INTRAVENOUS CONTINUOUS
OUTPATIENT
Start: 2023-08-30

## 2023-08-30 RX ORDER — SODIUM CHLORIDE 0.9 % (FLUSH) 0.9 %
10 SYRINGE (ML) INJECTION AS NEEDED
OUTPATIENT
Start: 2023-08-30

## 2023-08-30 NOTE — PROGRESS NOTES
"  Chief Complaint   Patient presents with    Shortness of Breath    Follow-up       Subjective   Tracey Nelson is a 82 y.o. female.   Patient was evaluated today for follow up of shortness of breath. Patient says that her symptoms have been worsening since the last clinic visit.     Patient does report 2-3 recent exacerbations.  The patient says that she occasionally feels chest fullness.    Patient is using Trelegy, as prescribed. Exercise tolerance has also progressively worsened slightly.     Patient continues to smoke 1/4 pack per day.    she is also here for abnormal CT.     she is using her incentive spirometer but continues to have thick sputum with cough.       The following portions of the patient's history were reviewed and updated as appropriate: allergies, current medications, past family history, past medical history, past social history, and past surgical history.    Review of Systems   HENT:  Negative for sinus pressure, sneezing and sore throat.    Respiratory:  Positive for cough. Negative for chest tightness, shortness of breath and wheezing.    Cardiovascular:  Negative for palpitations and leg swelling.     Objective   Visit Vitals  /65   Pulse 71   Resp 18   Ht 162.6 cm (64\")   Wt 41.5 kg (91 lb 9.6 oz)   SpO2 95%   BMI 15.72 kg/mý       Physical Exam  Vitals reviewed.   Constitutional:       Appearance: She is well-developed.   Cardiovascular:      Rate and Rhythm: Normal rate and regular rhythm.   Pulmonary:      Effort: Pulmonary effort is normal. No respiratory distress.      Comments: Somewhat hyperresonant to percussion.  Somewhat decreased air entry.  Mild scattered left sided crackles noted.   Musculoskeletal:      Cervical back: Neck supple.      Comments: Gait was slow.   Skin:     General: Skin is warm and dry.   Neurological:      Mental Status: She is alert and oriented to person, place, and time.         Assessment & Plan   Diagnoses and all orders for this visit:    1. " Chronic obstructive pulmonary disease, unspecified COPD type (Primary)    2. Abnormal CT of the chest    3. Chronic cough    4. Nicotine dependence, cigarettes, uncomplicated    Other orders  -     ipratropium-albuterol (DUO-NEB) 0.5-2.5 mg/3 ml nebulizer; Take 3 mL by nebulization 4 (Four) Times a Day As Needed for Wheezing or Shortness of Air.  Dispense: 360 mL; Refill: 5           Return in about 7 weeks (around 10/19/2023) for Recheck, For Biopsy results, For Sonya Garcia).    DISCUSSION (if any):  Last CT scan results was reviewed in great detail with the patient.  Results for orders placed during the hospital encounter of 08/17/22    CT Chest Without Contrast Diagnostic    Narrative  CT CHEST WITHOUT CONTRAST DIAGNOSTIC    HISTORY: Abnormal x-ray - lung nodule, < 1 cm, mod-high risk;  R93.89-Abnormal findings on diagnostic imaging of other specified body  structures.    COMPARISON: None.    TECHNIQUE: Axial CT without IV contrast administration.    FINDINGS:    Persistent opacity is seen in the left lower lobe showing mild  improvement. Largest component measures up to 4.2 x 1.9 cm, previously  4.2 x 3.1 cm. This is probably related to an area of pneumonia although  underlying postobstructive process remains in the differential  particularly given opacification of the left lower lobe bronchus. Right  lung is clear.    No pleural or pericardial effusion is seen. No adenopathy or mass lesion  is present.    Impression  Persistent parenchymal opacity of the left lower lobe which  could be due to a postobstructive process given opacification of the  left lower lobe bronchus. There is likely postobstructive pneumonia,  mildly improved.      This study was performed with techniques to keep radiation doses as low  as reasonably achievable (ALARA). Individualized dose reduction  techniques using automated exposure control or adjustment of vA and/or  kV according to the patient size were employed.    This report  was signed and finalized on 8/17/2022 4:11 PM by Ahsan Hui MD.      Latest CT, from August 18th, 2023 was reviewed.  It continues to show persistent and somewhat worsened left lower lobe atelectasis.  Her    Latest available PFTs were reviewed.  Consistent with moderate COPD. Performed in June 2022.    Due to symptoms which are suggestive of somewhat poorly controlled obstructive lung disease, I have decided to add DuoNeb twice daily, in the middle of the day.    Other medications will remain the same    Compliance with medications stressed.     Side effects of prescribed medications were discussed with the patient    Smoking cessation was advised    The CT scan shows that the lung abnormality has persisted, and somewhat worsened, for more than 1 year and since she continues to have symptoms, the only realistic option is to proceed with bronchoscopy.     I told her that although differential diagnosis does include mucous plugging, malignancy will also need to be considered especially in this patient with COPD/emphysema and chronic history of smoking.    The risks of bronchoscopy including but not limited to damage to the overlying structures, pneumothorax, bleeding, worsening of respiratory failure, requirement for mechanical ventilation, chest tube placement among others were explained.    The alternatives were also discussed with the patient.     The patient has considered the above-mentioned risks, benefits and alternatives and has agreed to proceed with the procedure.    The patient was also made aware of the possibility of mechanical ventilation and need for resuscitative measures, if needed, during and after the procedure.  The patient understood and agreed to proceed with the procedure after considering the possibilities mentioned above including mechanical ventilation/resuscitation.      Dictated utilizing Dragon dictation.    This document was electronically signed by Valerie Ramirez MD on 08/30/23  at 10:45 EDT

## 2023-09-01 ENCOUNTER — TELEPHONE (OUTPATIENT)
Dept: PULMONOLOGY | Facility: CLINIC | Age: 83
End: 2023-09-01
Payer: MEDICARE

## 2023-09-01 NOTE — TELEPHONE ENCOUNTER
Caller: Tracey Nelson    Relationship to patient: Self    Best call back number: [phone]    Patient is needing: PT CALLED AND STATED THAT SHE WAS TOLD TAKE HER NEBULIZER TWICE A DAY AND HER PRESCRIPTION SAYS TO TAKE IT 4 TIMES A DAY AND SHE DOESN'T KNOW WHAT TO DO

## 2023-09-05 ENCOUNTER — PREP FOR SURGERY (OUTPATIENT)
Dept: OTHER | Facility: HOSPITAL | Age: 83
End: 2023-09-05
Payer: MEDICARE

## 2023-09-05 DIAGNOSIS — R05.3 CHRONIC COUGH: ICD-10-CM

## 2023-09-05 DIAGNOSIS — R93.89 ABNORMAL CT OF THE CHEST: Primary | ICD-10-CM

## 2023-09-18 ENCOUNTER — TELEPHONE (OUTPATIENT)
Dept: PULMONOLOGY | Facility: CLINIC | Age: 83
End: 2023-09-18

## 2023-09-18 NOTE — TELEPHONE ENCOUNTER
[facility] staff attempted to follow warm transfer process and was unsuccessful     Caller: Tracey Nelson    Relationship to patient: Self    BEST CALL BACK NUMBER: [phone]    Patient is needing: PT HAS A BRONCHOSCOPY TOMORROW AND NEEDS THAT CANCELLED. WILL CALL BACK TO RESCHEDULE

## 2023-09-19 ENCOUNTER — TELEPHONE (OUTPATIENT)
Dept: PULMONOLOGY | Facility: CLINIC | Age: 83
End: 2023-09-19
Payer: MEDICARE

## 2023-09-19 NOTE — TELEPHONE ENCOUNTER
Patient was scheduled for bronchoscopy with Dr. Ramirez 09/19/23 and cancelled the procedure. Patient stated that she did not feel up to having it done today. I asked patient to give me some dates that worked best for her and I would compare those with the OR schedule and Dr. Ramirez to get her rescheduled, patient stated that she is having some dental work done soon and would like to wait on the bronchoscopy and give our office a call once she feels she is ready to have it done.

## 2023-10-06 ENCOUNTER — TELEPHONE (OUTPATIENT)
Dept: PULMONOLOGY | Facility: CLINIC | Age: 83
End: 2023-10-06
Payer: MEDICARE

## 2023-10-06 NOTE — TELEPHONE ENCOUNTER
[facility] was called and offered 10/13/2023 to do the bronchoscopy, patient declined. Provider notified.

## 2023-12-24 ENCOUNTER — APPOINTMENT (OUTPATIENT)
Dept: GENERAL RADIOLOGY | Facility: HOSPITAL | Age: 83
End: 2023-12-24
Payer: MEDICARE

## 2023-12-24 ENCOUNTER — HOSPITAL ENCOUNTER (EMERGENCY)
Facility: HOSPITAL | Age: 83
Discharge: HOME | End: 2023-12-25
Attending: EMERGENCY MEDICINE
Payer: MEDICARE

## 2023-12-24 DIAGNOSIS — R60.9 PERIPHERAL EDEMA: Primary | ICD-10-CM

## 2023-12-24 LAB
ALBUMIN SERPL-MCNC: 3.5 G/DL (ref 3.5–5.2)
ALBUMIN/GLOB SERPL: 0.9 G/DL
ALP SERPL-CCNC: 86 U/L (ref 39–117)
ALT SERPL W P-5'-P-CCNC: 21 U/L (ref 1–33)
ANION GAP SERPL CALCULATED.3IONS-SCNC: 11.2 MMOL/L (ref 5–15)
AST SERPL-CCNC: 22 U/L (ref 1–32)
BASOPHILS # BLD AUTO: 0.06 10*3/MM3 (ref 0–0.2)
BASOPHILS NFR BLD AUTO: 0.7 % (ref 0–1.5)
BILIRUB SERPL-MCNC: <0.2 MG/DL (ref 0–1.2)
BUN SERPL-MCNC: 11 MG/DL (ref 8–23)
BUN/CREAT SERPL: 16.2 (ref 7–25)
CALCIUM SPEC-SCNC: 9.5 MG/DL (ref 8.6–10.5)
CHLORIDE SERPL-SCNC: 101 MMOL/L (ref 98–107)
CO2 SERPL-SCNC: 25.8 MMOL/L (ref 22–29)
CREAT SERPL-MCNC: 0.68 MG/DL (ref 0.57–1)
DEPRECATED RDW RBC AUTO: 48.7 FL (ref 37–54)
EGFRCR SERPLBLD CKD-EPI 2021: 86 ML/MIN/1.73
EOSINOPHIL # BLD AUTO: 0.05 10*3/MM3 (ref 0–0.4)
EOSINOPHIL NFR BLD AUTO: 0.6 % (ref 0.3–6.2)
ERYTHROCYTE [DISTWIDTH] IN BLOOD BY AUTOMATED COUNT: 14.6 % (ref 12.3–15.4)
GLOBULIN UR ELPH-MCNC: 3.7 GM/DL
GLUCOSE SERPL-MCNC: 120 MG/DL (ref 65–99)
HCT VFR BLD AUTO: 35.2 % (ref 34–46.6)
HGB BLD-MCNC: 10.7 G/DL (ref 12–15.9)
HOLD SPECIMEN: NORMAL
HOLD SPECIMEN: NORMAL
IMM GRANULOCYTES # BLD AUTO: 0.02 10*3/MM3 (ref 0–0.05)
IMM GRANULOCYTES NFR BLD AUTO: 0.2 % (ref 0–0.5)
LYMPHOCYTES # BLD AUTO: 1.52 10*3/MM3 (ref 0.7–3.1)
LYMPHOCYTES NFR BLD AUTO: 17.6 % (ref 19.6–45.3)
MCH RBC QN AUTO: 27.7 PG (ref 26.6–33)
MCHC RBC AUTO-ENTMCNC: 30.4 G/DL (ref 31.5–35.7)
MCV RBC AUTO: 91.2 FL (ref 79–97)
MONOCYTES # BLD AUTO: 1.15 10*3/MM3 (ref 0.1–0.9)
MONOCYTES NFR BLD AUTO: 13.3 % (ref 5–12)
NEUTROPHILS NFR BLD AUTO: 5.86 10*3/MM3 (ref 1.7–7)
NEUTROPHILS NFR BLD AUTO: 67.6 % (ref 42.7–76)
NRBC BLD AUTO-RTO: 0 /100 WBC (ref 0–0.2)
NT-PROBNP SERPL-MCNC: 1399 PG/ML (ref 0–1800)
PLATELET # BLD AUTO: 321 10*3/MM3 (ref 140–450)
PMV BLD AUTO: 9 FL (ref 6–12)
POTASSIUM SERPL-SCNC: 4.1 MMOL/L (ref 3.5–5.2)
PROT SERPL-MCNC: 7.2 G/DL (ref 6–8.5)
RBC # BLD AUTO: 3.86 10*6/MM3 (ref 3.77–5.28)
SODIUM SERPL-SCNC: 138 MMOL/L (ref 136–145)
TROPONIN T SERPL HS-MCNC: 17 NG/L
WBC NRBC COR # BLD AUTO: 8.66 10*3/MM3 (ref 3.4–10.8)
WHOLE BLOOD HOLD COAG: NORMAL
WHOLE BLOOD HOLD SPECIMEN: NORMAL

## 2023-12-24 PROCEDURE — 80053 COMPREHEN METABOLIC PANEL: CPT

## 2023-12-24 PROCEDURE — 83880 ASSAY OF NATRIURETIC PEPTIDE: CPT

## 2023-12-24 PROCEDURE — 84484 ASSAY OF TROPONIN QUANT: CPT

## 2023-12-24 PROCEDURE — 71045 X-RAY EXAM CHEST 1 VIEW: CPT

## 2023-12-24 PROCEDURE — 93005 ELECTROCARDIOGRAM TRACING: CPT

## 2023-12-24 PROCEDURE — 85025 COMPLETE CBC W/AUTO DIFF WBC: CPT

## 2023-12-24 PROCEDURE — 99284 EMERGENCY DEPT VISIT MOD MDM: CPT

## 2023-12-24 RX ORDER — METOPROLOL TARTRATE 50 MG/1
50 TABLET, FILM COATED ORAL 2 TIMES DAILY
COMMUNITY

## 2023-12-24 RX ORDER — SODIUM CHLORIDE 0.9 % (FLUSH) 0.9 %
10 SYRINGE (ML) INJECTION AS NEEDED
Status: DISCONTINUED | OUTPATIENT
Start: 2023-12-24 | End: 2023-12-25 | Stop reason: HOSPADM

## 2023-12-25 VITALS
TEMPERATURE: 98.1 F | RESPIRATION RATE: 22 BRPM | WEIGHT: 104 LBS | SYSTOLIC BLOOD PRESSURE: 164 MMHG | BODY MASS INDEX: 17.33 KG/M2 | DIASTOLIC BLOOD PRESSURE: 69 MMHG | HEART RATE: 74 BPM | HEIGHT: 65 IN | OXYGEN SATURATION: 95 %

## 2023-12-25 RX ORDER — FUROSEMIDE 20 MG/1
20 TABLET ORAL DAILY
Status: DISCONTINUED | OUTPATIENT
Start: 2023-12-25 | End: 2023-12-25

## 2023-12-25 RX ORDER — FUROSEMIDE 20 MG/1
TABLET ORAL
Qty: 30 TABLET | Refills: 0 | Status: SHIPPED | OUTPATIENT
Start: 2023-12-25

## 2023-12-25 RX ORDER — FUROSEMIDE 20 MG/1
20 TABLET ORAL ONCE
Status: COMPLETED | OUTPATIENT
Start: 2023-12-25 | End: 2023-12-25

## 2023-12-25 RX ADMIN — FUROSEMIDE 20 MG: 20 TABLET ORAL at 00:41

## 2023-12-30 NOTE — ED PROVIDER NOTES
"Subjective  History of Present Illness:    Chief Complaint: ***    History of Present Illness: ***    Nurses Notes reviewed and agree, including vitals, allergies, social history and prior medical history.     REVIEW OF SYSTEMS: All systems reviewed and not pertinent unless noted.  Review of Systems      Positive for:  ***    Negative for:***    Past Medical History:   Diagnosis Date    Allergies     Bronchitis     Cataract, bilateral     s/p surgery    Coronary artery disease     Hyperlipidemia     LBBB (left bundle branch block) 2018    Myocardial infarction     pt denies    PAD (peripheral artery disease)     stent in each leg    Pneumonia     Skin cancer     Wears dentures     upper plate and lower is a partial       Allergies:    Patient has no known allergies.      Past Surgical History:   Procedure Laterality Date    CAROTID ENDARTERECTOMY Left     CATARACT EXTRACTION W/ INTRAOCULAR LENS  IMPLANT, BILATERAL      COLONOSCOPY      DILATION AND CURETTAGE, DIAGNOSTIC / THERAPEUTIC      OTHER SURGICAL HISTORY Bilateral     stent in each leg    SKIN BIOPSY      SKIN CANCER EXCISION      TONSILLECTOMY           Social History     Socioeconomic History    Marital status:    Tobacco Use    Smoking status: Some Days     Packs/day: 0.50     Years: 40.00     Additional pack years: 0.00     Total pack years: 20.00     Types: Cigarettes    Smokeless tobacco: Never   Vaping Use    Vaping Use: Never used   Substance and Sexual Activity    Alcohol use: Never    Drug use: Defer    Sexual activity: Defer         History reviewed. No pertinent family history.    Objective  Physical Exam:  /69   Pulse 74   Temp 98.1 °F (36.7 °C) (Oral)   Resp 22   Ht 165.1 cm (65\")   Wt 47.2 kg (104 lb)   SpO2 95%   BMI 17.31 kg/m²      Physical Exam    CONSTITUTIONAL: Well developed, ***,  in ***.  VITAL SIGNS: per nursing, reviewed and noted  SKIN: exposed skin with no rashes, ulcerations or petechiae  EYES: Grossly EOMI, no " icterus  ENT: Normal voice.  Moist mucous membranes   RESPIRATORY:  No increased work of breathing. No retractions.   CARDIOVASCULAR:   Extremities pink and warm.  Good cap refill to extremities.   GI: Abdomen without distention   MUSCULOSKELETAL: Age-appropriate bulk and tone, moves all 4 extremities  NEUROLOGIC: Alert, oriented x 3. No gross deficits. GCS 15.   PSYCH: appropriate affect.  : no bladder tenderness or distention, no CVA tenderness    Procedures    ED Course:    Lab Results (last 24 hours)       ** No results found for the last 24 hours. **             No radiology results from the last 24 hrs     MDM     Amount and/or Complexity of Data Reviewed  Clinical lab tests: reviewed  Tests in the medicine section of CPT®: reviewed        ED Course as of 12/30/23 0720   Sun Dec 24, 2023   2212 EKG interpreted by me reveals sinus rhythm rate of 78 left bundle branch block., [PF]      ED Course User Index  [PF] Stanton Booker,        Medical Decision Making:    Initial impression of presenting illness: ***    DDX: includes but is not limited to: ***         Patient arrives *** with vitals interpreted by myself.     Pertinent features from physical exam: ***.    Initial diagnostic plan: ***    Results from initial plan were reviewed and interpreted by me revealing ***    Diagnostic information from other sources: ***    Interventions / Re-evaluation: ***    Results/clinical rationale were discussed with ***    Consultations/Discussion of results with other physicians: ***    Disposition plan: ***  -----      Final diagnoses:   Peripheral edema          myself.     Pertinent features from physical exam: Chest clear, 1+ edema, serous drainage from noncellulitic's small ulceration right shin.    Initial diagnostic plan: Shortness of breath labs EKG cardiac monitoring chest x-ray    Results from initial plan were reviewed and interpreted by me revealing left bundle branch the EKG, BNP 1399 troponin 17 white count normal hemoglobin 10.7 nonactionable CMP.  Chest x-ray with similar appearance compared to previous CT  film.  8/2/2022  Diagnostic information from other sources: Old record review    Interventions / Re-evaluation: Bilateral lower extremity Ace wraps, Lasix    Results/clinical rationale were discussed with patient and family    Consultations/Discussion of results with other physicians: None    Disposition plan: Patient was discharged in stable condition.  Counseled on supportive care, outpatient follow-up. Return precaution discussed.  Patient/family was understanding and agreeable with plan    -----    Prescription Lasix    Final diagnoses:   Peripheral edema            Stanton Booker,   01/04/24 0713

## 2024-02-19 ENCOUNTER — APPOINTMENT (OUTPATIENT)
Dept: CT IMAGING | Facility: HOSPITAL | Age: 84
DRG: 521 | End: 2024-02-19
Payer: MEDICARE

## 2024-02-19 ENCOUNTER — APPOINTMENT (OUTPATIENT)
Dept: GENERAL RADIOLOGY | Facility: HOSPITAL | Age: 84
DRG: 521 | End: 2024-02-19
Payer: MEDICARE

## 2024-02-19 ENCOUNTER — APPOINTMENT (OUTPATIENT)
Dept: CARDIOLOGY | Facility: HOSPITAL | Age: 84
DRG: 521 | End: 2024-02-19
Payer: MEDICARE

## 2024-02-19 ENCOUNTER — HOSPITAL ENCOUNTER (INPATIENT)
Facility: HOSPITAL | Age: 84
LOS: 5 days | Discharge: HOME HEALTH | DRG: 521 | End: 2024-02-24
Attending: EMERGENCY MEDICINE | Admitting: INTERNAL MEDICINE
Payer: MEDICARE

## 2024-02-19 DIAGNOSIS — S72.009A: ICD-10-CM

## 2024-02-19 DIAGNOSIS — R05.3 CHRONIC COUGH: ICD-10-CM

## 2024-02-19 DIAGNOSIS — S72.002A CLOSED FRACTURE OF LEFT HIP, INITIAL ENCOUNTER: Primary | ICD-10-CM

## 2024-02-19 DIAGNOSIS — R93.89 ABNORMAL CT OF THE CHEST: ICD-10-CM

## 2024-02-19 PROBLEM — I73.9 PERIPHERAL ARTERY DISEASE: Status: ACTIVE | Noted: 2024-02-19

## 2024-02-19 LAB
ANION GAP SERPL CALCULATED.3IONS-SCNC: 12.7 MMOL/L (ref 5–15)
APTT PPP: 28.7 SECONDS (ref 70–100)
BASOPHILS # BLD AUTO: 0.02 10*3/MM3 (ref 0–0.2)
BASOPHILS NFR BLD AUTO: 0.2 % (ref 0–1.5)
BUN SERPL-MCNC: 9 MG/DL (ref 8–23)
BUN/CREAT SERPL: 21.4 (ref 7–25)
CALCIUM SPEC-SCNC: 9.2 MG/DL (ref 8.6–10.5)
CHLORIDE SERPL-SCNC: 96 MMOL/L (ref 98–107)
CO2 SERPL-SCNC: 23.3 MMOL/L (ref 22–29)
CREAT SERPL-MCNC: 0.42 MG/DL (ref 0.57–1)
DEPRECATED RDW RBC AUTO: 50.5 FL (ref 37–54)
EGFRCR SERPLBLD CKD-EPI 2021: 96.6 ML/MIN/1.73
EOSINOPHIL # BLD AUTO: 0.02 10*3/MM3 (ref 0–0.4)
EOSINOPHIL NFR BLD AUTO: 0.2 % (ref 0.3–6.2)
ERYTHROCYTE [DISTWIDTH] IN BLOOD BY AUTOMATED COUNT: 16.5 % (ref 12.3–15.4)
GLUCOSE SERPL-MCNC: 96 MG/DL (ref 65–99)
HCT VFR BLD AUTO: 29.9 % (ref 34–46.6)
HGB BLD-MCNC: 9.2 G/DL (ref 12–15.9)
IMM GRANULOCYTES # BLD AUTO: 0.03 10*3/MM3 (ref 0–0.05)
IMM GRANULOCYTES NFR BLD AUTO: 0.3 % (ref 0–0.5)
INR PPP: 1.19 (ref 0.9–1.1)
LYMPHOCYTES # BLD AUTO: 0.72 10*3/MM3 (ref 0.7–3.1)
LYMPHOCYTES NFR BLD AUTO: 8.1 % (ref 19.6–45.3)
MCH RBC QN AUTO: 25.6 PG (ref 26.6–33)
MCHC RBC AUTO-ENTMCNC: 30.8 G/DL (ref 31.5–35.7)
MCV RBC AUTO: 83.3 FL (ref 79–97)
MONOCYTES # BLD AUTO: 0.68 10*3/MM3 (ref 0.1–0.9)
MONOCYTES NFR BLD AUTO: 7.7 % (ref 5–12)
NEUTROPHILS NFR BLD AUTO: 7.41 10*3/MM3 (ref 1.7–7)
NEUTROPHILS NFR BLD AUTO: 83.5 % (ref 42.7–76)
NRBC BLD AUTO-RTO: 0 /100 WBC (ref 0–0.2)
PLATELET # BLD AUTO: 336 10*3/MM3 (ref 140–450)
PMV BLD AUTO: 9.1 FL (ref 6–12)
POTASSIUM SERPL-SCNC: 3.7 MMOL/L (ref 3.5–5.2)
PROTHROMBIN TIME: 15.6 SECONDS (ref 12.3–15.1)
RBC # BLD AUTO: 3.59 10*6/MM3 (ref 3.77–5.28)
SODIUM SERPL-SCNC: 132 MMOL/L (ref 136–145)
WBC NRBC COR # BLD AUTO: 8.88 10*3/MM3 (ref 3.4–10.8)

## 2024-02-19 PROCEDURE — 99223 1ST HOSP IP/OBS HIGH 75: CPT | Performed by: INTERNAL MEDICINE

## 2024-02-19 PROCEDURE — 85025 COMPLETE CBC W/AUTO DIFF WBC: CPT | Performed by: EMERGENCY MEDICINE

## 2024-02-19 PROCEDURE — 70450 CT HEAD/BRAIN W/O DYE: CPT

## 2024-02-19 PROCEDURE — 85610 PROTHROMBIN TIME: CPT | Performed by: EMERGENCY MEDICINE

## 2024-02-19 PROCEDURE — 72125 CT NECK SPINE W/O DYE: CPT

## 2024-02-19 PROCEDURE — 25010000002 ENOXAPARIN PER 10 MG: Performed by: INTERNAL MEDICINE

## 2024-02-19 PROCEDURE — 99285 EMERGENCY DEPT VISIT HI MDM: CPT

## 2024-02-19 PROCEDURE — 85730 THROMBOPLASTIN TIME PARTIAL: CPT | Performed by: EMERGENCY MEDICINE

## 2024-02-19 PROCEDURE — 73502 X-RAY EXAM HIP UNI 2-3 VIEWS: CPT

## 2024-02-19 PROCEDURE — 25010000002 MORPHINE PER 10 MG: Performed by: INTERNAL MEDICINE

## 2024-02-19 PROCEDURE — 93005 ELECTROCARDIOGRAM TRACING: CPT | Performed by: EMERGENCY MEDICINE

## 2024-02-19 PROCEDURE — 71045 X-RAY EXAM CHEST 1 VIEW: CPT

## 2024-02-19 PROCEDURE — 80048 BASIC METABOLIC PNL TOTAL CA: CPT | Performed by: EMERGENCY MEDICINE

## 2024-02-19 RX ORDER — SODIUM CHLORIDE 0.9 % (FLUSH) 0.9 %
10 SYRINGE (ML) INJECTION AS NEEDED
Status: DISCONTINUED | OUTPATIENT
Start: 2024-02-19 | End: 2024-02-24 | Stop reason: HOSPADM

## 2024-02-19 RX ORDER — POLYETHYLENE GLYCOL 3350 17 G/17G
17 POWDER, FOR SOLUTION ORAL DAILY PRN
Status: DISCONTINUED | OUTPATIENT
Start: 2024-02-19 | End: 2024-02-20

## 2024-02-19 RX ORDER — ENOXAPARIN SODIUM 100 MG/ML
30 INJECTION SUBCUTANEOUS NIGHTLY
Status: DISCONTINUED | OUTPATIENT
Start: 2024-02-19 | End: 2024-02-20

## 2024-02-19 RX ORDER — BUDESONIDE 0.5 MG/2ML
0.5 INHALANT ORAL
Status: DISCONTINUED | OUTPATIENT
Start: 2024-02-19 | End: 2024-02-24 | Stop reason: HOSPADM

## 2024-02-19 RX ORDER — BISACODYL 10 MG
10 SUPPOSITORY, RECTAL RECTAL DAILY PRN
Status: DISCONTINUED | OUTPATIENT
Start: 2024-02-19 | End: 2024-02-20

## 2024-02-19 RX ORDER — ONDANSETRON 2 MG/ML
4 INJECTION INTRAMUSCULAR; INTRAVENOUS EVERY 6 HOURS PRN
Status: DISCONTINUED | OUTPATIENT
Start: 2024-02-19 | End: 2024-02-24 | Stop reason: HOSPADM

## 2024-02-19 RX ORDER — METOPROLOL TARTRATE 50 MG/1
50 TABLET, FILM COATED ORAL 2 TIMES DAILY
Status: DISCONTINUED | OUTPATIENT
Start: 2024-02-19 | End: 2024-02-22

## 2024-02-19 RX ORDER — ASPIRIN 81 MG/1
81 TABLET ORAL DAILY
Status: DISCONTINUED | OUTPATIENT
Start: 2024-02-20 | End: 2024-02-24 | Stop reason: HOSPADM

## 2024-02-19 RX ORDER — CILOSTAZOL 100 MG/1
50 TABLET ORAL 2 TIMES DAILY
Status: DISCONTINUED | OUTPATIENT
Start: 2024-02-19 | End: 2024-02-24 | Stop reason: HOSPADM

## 2024-02-19 RX ORDER — ACETAMINOPHEN 160 MG/5ML
650 SOLUTION ORAL EVERY 4 HOURS PRN
Status: DISCONTINUED | OUTPATIENT
Start: 2024-02-19 | End: 2024-02-24 | Stop reason: HOSPADM

## 2024-02-19 RX ORDER — SODIUM CHLORIDE 9 MG/ML
40 INJECTION, SOLUTION INTRAVENOUS AS NEEDED
Status: DISCONTINUED | OUTPATIENT
Start: 2024-02-19 | End: 2024-02-24 | Stop reason: HOSPADM

## 2024-02-19 RX ORDER — AMOXICILLIN 250 MG
2 CAPSULE ORAL 2 TIMES DAILY
Status: DISCONTINUED | OUTPATIENT
Start: 2024-02-19 | End: 2024-02-20

## 2024-02-19 RX ORDER — BISACODYL 5 MG/1
5 TABLET, DELAYED RELEASE ORAL DAILY PRN
Status: DISCONTINUED | OUTPATIENT
Start: 2024-02-19 | End: 2024-02-20

## 2024-02-19 RX ORDER — ACETAMINOPHEN 650 MG/1
650 SUPPOSITORY RECTAL EVERY 4 HOURS PRN
Status: DISCONTINUED | OUTPATIENT
Start: 2024-02-19 | End: 2024-02-24 | Stop reason: HOSPADM

## 2024-02-19 RX ORDER — ACETAMINOPHEN 325 MG/1
650 TABLET ORAL EVERY 4 HOURS PRN
Status: DISCONTINUED | OUTPATIENT
Start: 2024-02-19 | End: 2024-02-24 | Stop reason: HOSPADM

## 2024-02-19 RX ORDER — NALOXONE HCL 0.4 MG/ML
0.4 VIAL (ML) INJECTION
Status: DISCONTINUED | OUTPATIENT
Start: 2024-02-19 | End: 2024-02-23

## 2024-02-19 RX ORDER — ATORVASTATIN CALCIUM 20 MG/1
20 TABLET, FILM COATED ORAL NIGHTLY
Status: DISCONTINUED | OUTPATIENT
Start: 2024-02-19 | End: 2024-02-24 | Stop reason: HOSPADM

## 2024-02-19 RX ORDER — MORPHINE SULFATE 2 MG/ML
2 INJECTION, SOLUTION INTRAMUSCULAR; INTRAVENOUS EVERY 4 HOURS PRN
Status: DISCONTINUED | OUTPATIENT
Start: 2024-02-19 | End: 2024-02-23

## 2024-02-19 RX ORDER — IPRATROPIUM BROMIDE AND ALBUTEROL SULFATE 2.5; .5 MG/3ML; MG/3ML
3 SOLUTION RESPIRATORY (INHALATION) 4 TIMES DAILY PRN
Status: DISCONTINUED | OUTPATIENT
Start: 2024-02-19 | End: 2024-02-24 | Stop reason: HOSPADM

## 2024-02-19 RX ORDER — SODIUM CHLORIDE 0.9 % (FLUSH) 0.9 %
10 SYRINGE (ML) INJECTION EVERY 12 HOURS SCHEDULED
Status: DISCONTINUED | OUTPATIENT
Start: 2024-02-19 | End: 2024-02-24 | Stop reason: HOSPADM

## 2024-02-19 RX ORDER — LOSARTAN POTASSIUM 50 MG/1
50 TABLET ORAL DAILY
Status: DISCONTINUED | OUTPATIENT
Start: 2024-02-20 | End: 2024-02-24 | Stop reason: HOSPADM

## 2024-02-19 RX ORDER — METHOCARBAMOL 750 MG/1
750 TABLET, FILM COATED ORAL ONCE
Status: COMPLETED | OUTPATIENT
Start: 2024-02-19 | End: 2024-02-19

## 2024-02-19 RX ADMIN — MORPHINE SULFATE 2 MG: 2 INJECTION, SOLUTION INTRAMUSCULAR; INTRAVENOUS at 22:58

## 2024-02-19 RX ADMIN — ENOXAPARIN SODIUM 30 MG: 30 INJECTION SUBCUTANEOUS at 22:49

## 2024-02-19 RX ADMIN — Medication 10 ML: at 22:54

## 2024-02-19 RX ADMIN — METHOCARBAMOL 750 MG: 750 TABLET, FILM COATED ORAL at 14:07

## 2024-02-19 RX ADMIN — ATORVASTATIN CALCIUM 20 MG: 20 TABLET, FILM COATED ORAL at 22:50

## 2024-02-19 RX ADMIN — CILOSTAZOL 50 MG: 100 TABLET ORAL at 22:50

## 2024-02-19 NOTE — PHARMACY RECOMMENDATION
"Pharmacy Consult - Enoxaparin Dosing  Pharmacy was consulted to dose enoxaparin for  Tracey Nelson, a 83 y.o. female  165.1 cm (65\") 42.6 kg (94 lb)    Indication: VTE prophylaxis    Allergies  Patient has no known allergies.    Relevant clinical data and objective history reviewed:   [Ht: 165.1 cm (65\"); Wt: 42.6 kg (94 lb)]  Body mass index is 15.64 kg/m².  Estimated Creatinine Clearance: 67.1 mL/min (A) (by C-G formula based on SCr of 0.42 mg/dL (L)).  Results from last 7 days   Lab Units 02/19/24  1546   HEMOGLOBIN g/dL 9.2*   HEMATOCRIT % 29.9*   PLATELETS 10*3/mm3 336   CREATININE mg/dL 0.42*       Asessment/Plan  Initiate enoxaparin 30 mg SQ every 24 hours  Pharmacy will monitor renal function and clinical status and adjust the dose and/or frequency as needed.    Thanks,   Sana Kohler, PharmD, BCPS  2/19/2024  17:29 EST    "

## 2024-02-19 NOTE — CASE MANAGEMENT/SOCIAL WORK
Discharge Planning Assessment  [facility]     Patient Name: Tracey Nelson  MRN: [id number]  Today's Date: 2/19/2024    Admit Date: 2/19/2024    Plan: The patient is awake and able to answer questions.  Her sister is at bedside and she consents to her sister being involved in her DC plans.  She is a current patient of Dr. Wright and gets her medications from [facility].  She elects to be enrolled in Meds to Bed.  She does not use DME at home.  She does state that she will need a walker and bedside commode, along with home health for PT and OT services when she returns home.  She is adamant that she is not going to go to a nursing facility for rehab.  She does not currently have a preference for home health company.  The patient does smoke, but declines resources for smoking cessation.  At the time of DC she plans to return to the home she shares with her grandson and will receive PT and OT services with home health.  Questions and concerns were addressed and Roadmap to Recovery and IMM delivered at the time of this conversation.  Will provide additional resources and information upon patient request.   Discharge Needs Assessment       Row Name 02/19/24 1726       Living Environment    People in Home grandchild(patsy)    Name(s) of People in Home Huseyin, Grandson    Current Living Arrangements home    Duration at Residence 30 years    Potentially Unsafe Housing Conditions none    In the past 12 months has the electric, gas, oil, or water company threatened to shut off services in your home? No    Primary Care Provided by self;other (see comments)  Grandson    Provides Primary Care For no one, unable/limited ability to care for self    Quality of Family Relationships helpful;involved;supportive    Able to Return to Prior Arrangements yes    Living Arrangement Comments Patient is adamant that she is not going to go to a facility for STR       Resource/Environmental Concerns    Resource/Environmental Concerns none     Transportation Concerns none       Transportation Needs    In the past 12 months, has lack of transportation kept you from medical appointments or from getting medications? no    In the past 12 months, has lack of transportation kept you from meetings, work, or from getting things needed for daily living? No       Food Insecurity    Within the past 12 months, you worried that your food would run out before you got the money to buy more. Never true    Within the past 12 months, the food you bought just didn't last and you didn't have money to get more. Never true       Transition Planning    Patient/Family Anticipates Transition to home with help/services    Patient/Family Anticipated Services at Transition durable medical equipment;home health care       Discharge Needs Assessment    Readmission Within the Last 30 Days no previous admission in last 30 days    Equipment Currently Used at Home none    Concerns to be Addressed discharge planning    Anticipated Changes Related to Illness inability to care for self    Equipment Needed After Discharge walker, rolling;annel    Outpatient/Agency/Support Group Needs homecare agency    Discharge Facility/Level of Care Needs home with home health    Provided Post Acute Provider List? Yes    Post Acute Provider List Home Health    Provided Post Acute Provider Quality & Resource List? Yes    Post Acute Provider Quality and Resource List Home Health    Delivered To Patient    Method of Delivery In person    Patient's Choice of Community Agency(s) Patient does not have a preference at this time    Current Discharge Risk dependent with mobility/activities of daily living                   Discharge Plan       Row Name 02/19/24 5001       Plan    Plan The patient is awake and able to answer questions.  Her sister is at bedside and she consents to her sister being involved in her DC plans.  She is a current patient of Dr. Wright and gets her medications from [facility].  She elects to  be enrolled in Meds to Bed.  She does not use DME at home.  She does state that she will need a walker and bedside commode, along with home health for PT and OT services when she returns home.  She is adamant that she is not going to go to a nursing facility for rehab.  She does not currently have a preference for home health company.  The patient does smoke, but declines resources for smoking cessation.  At the time of DC she plans to return to the home she shares with her grandson and will receive PT and OT services with home health.  Questions and concerns were addressed and Roadmap to Recovery and IMM delivered at the time of this conversation.  Will provide additional resources and information upon patient request.    Roadmap to Recovery Yes    Patient/Family in Agreement with Plan yes    Provided Post Acute Provider List? Yes    Post Acute Provider List Home Health    Provided Post Acute Provider Quality & Resource List? Yes    Post Acute Provider Quality and Resource List Home Health    Delivered To Patient    Method of Delivery In person    Plan Comments Patient does not have a company preference for home health at this time.  Has requested a walker and bedside commode at time of DC.    Final Discharge Disposition Code 06 - home with home health care    Final Note Plans to DC home with grandson and home health                  Continued Care and Services - Admitted Since 2/19/2024    Coordination has not been started for this encounter.          Demographic Summary       Row Name 02/19/24 1725       General Information    Admission Type inpatient    Arrived From emergency department    Required Notices Provided Important Message from Medicare    Referral Source admission list    Reason for Consult discharge planning       Contact Information    Permission Granted to Share Info With                    Functional Status       Row Name 02/19/24 3609       Functional Status    Usual Activity  Tolerance good    Current Activity Tolerance poor       Physical Activity    On average, how many days per week do you engage in moderate to strenuous exercise (like a brisk walk)? 0 days    On average, how many minutes do you engage in exercise at this level? 0 min    Number of minutes of exercise per week 0       Assessment of Health Literacy    How often do you have someone help you read hospital materials? Occasionally    How often do you have problems learning about your medical condition because of difficulty understanding written information? Occasionally    How often do you have a problem understanding what is told to you about your medical condition? Occasionally    How confident are you filling out medical forms by yourself? Quite a bit    Health Literacy Good       Functional Status, IADL    Medications independent    Meal Preparation assistive person    Housekeeping assistive person    Laundry assistive person    Shopping assistive person       Mental Status    General Appearance WDL WDL       Mental Status Summary    Recent Changes in Mental Status/Cognitive Functioning no changes                   Psychosocial       Row Name 02/19/24 1725       Values/Beliefs    Spiritual, Cultural Beliefs, Latter day Practices, Values that Affect Care no       Behavior WDL    Behavior WDL WDL       Emotion Mood WDL    Emotion/Mood/Affect WDL WDL       Mental Health    Little Interest or Pleasure in Doing Things 0-->not at all    Feeling Down, Depressed or Hopeless 0-->not at all       Speech WDL    Speech WDL WDL       Perceptual State WDL    Perceptual State WDL WDL       Thought Process WDL    Thought Process WDL WDL       Intellectual Performance WDL    Intellectual Performance WDL WDL                   Abuse/Neglect       Row Name 02/19/24 1725       Personal Safety    Feels Unsafe at Home or Work/School no    Feels Threatened by Someone no    Does Anyone Try to Keep You From Having Contact with Others or Doing  Things Outside Your Home? no    Physical Signs of Abuse Present no                   Legal       Row Name 02/19/24 1725       Financial Resource Strain    How hard is it for you to pay for the very basics like food, housing, medical care, and heating? Not hard                   Substance Abuse       Row Name 02/19/24 1725       Substance Use    Substance Use Status current tobacco use    Last Tobacco Use Date 02/19/24  Patient denies resources for smoking cessation                   Patient Forms       Row Name 02/19/24 1735       Patient Forms    Important Message from Medicare (IMM) Delivered    Delivered to Patient    Method of delivery In person                      Renetta Villavicencio RN

## 2024-02-19 NOTE — ED PROVIDER NOTES
Subjective   History of Present Illness  This 83-year-old female with past medical history of coronary artery disease presented to the emergency department after an accidental fall.  The patient states that she was walking around her car when she tripped on the concrete.  She fell forward and landed on her left side.  She did hit her left forehead as well as her left hip.  She is complaining some mild headache as well as left hip pain.  She was able to ambulate afterwards, however cause pain.  She is having some left hip pain when she changes positions.  There is no loss of consciousness.  Patient denies any change in vision or focal weakness.  No neck pain.  No chest pain or shortness of breath.  No abdominal pain or vomiting.    History provided by:  Patient and relative   used: No        Review of Systems   Constitutional:  Negative for chills and fever.   HENT:  Negative for congestion, ear pain and sore throat.    Eyes:  Negative for visual disturbance.   Respiratory:  Negative for shortness of breath.    Cardiovascular:  Negative for chest pain.   Gastrointestinal:  Negative for abdominal pain.   Genitourinary:  Negative for difficulty urinating.   Musculoskeletal:  Positive for arthralgias.   Skin:  Negative for rash.   Neurological:  Positive for headaches. Negative for dizziness, weakness and numbness.   Psychiatric/Behavioral:  Negative for agitation.        Past Medical History:   Diagnosis Date    Allergies     Bronchitis     Cataract, bilateral     s/p surgery    Coronary artery disease     Hyperlipidemia     LBBB (left bundle branch block) 2018    Myocardial infarction     pt denies    PAD (peripheral artery disease)     stent in each leg    Pneumonia     Skin cancer     Wears dentures     upper plate and lower is a partial       No Known Allergies    Past Surgical History:   Procedure Laterality Date    CAROTID ENDARTERECTOMY Left     CATARACT EXTRACTION W/ INTRAOCULAR LENS   IMPLANT, BILATERAL      COLONOSCOPY      DILATION AND CURETTAGE, DIAGNOSTIC / THERAPEUTIC      OTHER SURGICAL HISTORY Bilateral     stent in each leg    SKIN BIOPSY      SKIN CANCER EXCISION      TONSILLECTOMY         History reviewed. No pertinent family history.    Social History     Socioeconomic History    Marital status:    Tobacco Use    Smoking status: Some Days     Packs/day: 0.50     Years: 40.00     Additional pack years: 0.00     Total pack years: 20.00     Types: Cigarettes    Smokeless tobacco: Never   Vaping Use    Vaping Use: Never used   Substance and Sexual Activity    Alcohol use: Never    Drug use: Defer    Sexual activity: Defer           Objective   Physical Exam  Vitals and nursing note reviewed.   Constitutional:       General: She is not in acute distress.     Appearance: She is not ill-appearing or toxic-appearing.   HENT:      Head:      Comments: Small hematoma along the left frontal region.  No evidence of basilar skull fracture     Mouth/Throat:      Pharynx: No posterior oropharyngeal erythema.   Eyes:      Conjunctiva/sclera: Conjunctivae normal.      Pupils: Pupils are equal, round, and reactive to light.   Cardiovascular:      Rate and Rhythm: Normal rate and regular rhythm.   Pulmonary:      Effort: Pulmonary effort is normal. No respiratory distress.   Abdominal:      General: Abdomen is flat. There is no distension.      Palpations: There is no mass.      Tenderness: There is no abdominal tenderness. There is no guarding or rebound.   Musculoskeletal:         General: No deformity. Normal range of motion.      Comments: Mild tenderness to palpation along the left hip.  There is no evidence of instability of the pelvis.  Compartment soft.  Neurovascular intact   Skin:     General: Skin is warm.      Findings: No rash.   Neurological:      General: No focal deficit present.      Mental Status: She is alert and oriented to person, place, and time.      Motor: No weakness.          ECG 12 Lead QT Measurement      Date/Time: 2/19/2024 5:01 PM    Performed by: Sumanth Trejo MD  Authorized by: Sumanth Trejo MD  Interpreted by ED physician  Comparison: compared with previous ECG   Similar to previous ECG  Rhythm: sinus rhythm  Ectopy: PVCs  Rate: normal  BPM: 87  QRS axis: normal  Conduction: conduction normal  Other findings comments: Nonspecific ST changes  Clinical impression: non-specific ECG  Comments: EKG was directly visualized by myself, interpretations as documented in hospital course.               ED Course  ED Course as of 02/19/24 1704 Mon Feb 19, 2024   1400 BP: 164/58 [JK]   1400 Temp: 98.5 °F (36.9 °C) [JK]   1400 Temp src: Oral [JK]   1400 Heart Rate: 97 [JK]   1400 Resp: 16 [JK]   1400 SpO2: 94 %  Patient's repeat vitals, telemetry tracing, and pulse oximetry tracing were directly viewed and interpreted by myself.  Normal sinus rhythm [JK]   1701 Protime-INR(!) [JK]   1701 aPTT(!) [JK]   1701 CBC & Differential(!) [JK]   1701 Basic Metabolic Panel(!) [JK]   1702 Laboratory studies were reviewed and interpreted directly by myself.  INR was slightly elevated at 1.19, BMP unremarkable, CBC shows a chronic anemia with a hemoglobin of 9.2 [JK]   1702 XR Chest 1 View [JK]   1702 XR Hip With or Without Pelvis 2 - 3 View Left [JK]   1702 CT Cervical Spine Without Contrast [JK]   1702 CT Head Without Contrast  Imaging was directly visualized by myself, per my interpretations, chest x-ray showed a chronic retrohilar infiltrate, x-ray of the left hip showed a femoral neck fracture, CT C-spine was unremarkable, CT of the head showed some chronic changes. [JK]   1702 Patient has findings consistent with an acute fracture of the left hip.  I did consult on-call orthopedic surgery, Dr. Gomez.  He was notified about the patient.  Patient be admitted for further evaluation and treatment. [JK]   1703 Based on the patient's presentation, history and diffuse work-up in the  emergency department, the patient is deemed appropriate for admission to the hospital for further evaluation and treatment.  This was discussed with the patient at bedside.  They are in agreement with the current medical management.    Admitting physician: Dr. Burnett    Discussion was had with admitting physician regarding the laboratory and imaging findings.  We did discuss current therapeutics in the emergency department and progression of the patient.  Working diagnosis was conveyed to the admitting physician, as well as current status and prognosis for the patient.  They are in agreement with these findings and have accepted admission.    Shared decision making:   After full review of the patient's clinical presentation, review of any work-up including but not limited to laboratory studies and radiology obtained, I had a discussion with the patient.  Treatment options were discussed as well as the risks, benefits and consequences.  I discussed all findings with the patient and family members if available.  During the discussion, treatment goals were understood by all as well as any misconceptions which were addressed with the patient.  Ample time was given for any questions they may have had.  They are in agreement with the treatment plan as well as final disposition. [JK]      ED Course User Index  [JK] Sumanth Trejo MD                                             Medical Decision Making  This is a 83-year-old female presented to the emergency department after an accidental fall.  The patient tripped while walking on concrete.  She small hematoma to the left left forehead as well as some left hip pain.  Patient meets imaging criteria for the head and cervical spine based on [employer] guidelines.  Most concern for possible hip fracture contusion.  Patient given analgesics.  Imaging obtained.      Differential diagnosis: Closed head injury, intracranial abnormality, skull fracture, left hip sprain, strain,  contusion, fracture      Amount and/or Complexity of Data Reviewed  External Data Reviewed: labs, radiology and notes.     Details: External laboratories, imaging as well as notes were reviewed personally by myself.  All relevant studies were used to guide decision making.     Date of previous record: 11/17/2023    Source of note: Primary care physician    Summary: Patient seen evaluate for routine visit.  Did review basic laboratory studies on file as well as previous chest x-ray.  Records reviewed.    Labs: ordered. Decision-making details documented in ED Course.  Radiology: ordered and independent interpretation performed. Decision-making details documented in ED Course.  ECG/medicine tests: ordered and independent interpretation performed. Decision-making details documented in ED Course.    Risk  Prescription drug management.        Final diagnoses:   Closed fracture of left hip, initial encounter       ED Disposition  ED Disposition       ED Disposition   Decision to Admit    Condition   --    Comment   Level of Care: Telemetry [5]   Diagnosis: Closed left hip fracture [225521]   Admitting Physician: JUSTIN CAMPBELL [[id number]]   Attending Physician: JUSTIN CAMPBELL [[id number]]   Certification: I Certify That Inpatient Hospital Services Are Medically Necessary For Greater Than 2 Midnights                 No follow-up provider specified.       Medication List      No changes were made to your prescriptions during this visit.            Sumanth Trejo MD  02/19/24 [id number]

## 2024-02-19 NOTE — PLAN OF CARE
Goal Outcome Evaluation:  Plan of Care Reviewed With: patient           Outcome Evaluation: New Admission

## 2024-02-19 NOTE — H&P
[facility]   HISTORY AND PHYSICAL      Name:  Tracey Nelson   Age:  83 y.o.  Sex:  female  :  1940  MRN:  [id number]   Visit Number:  [id number]  Admission Date:  2024  Date Of Service:  24  Primary Care Physician:  Leola Wright MD    Chief Complaint:     Fall and left hip pain.    History Of Presenting Illness:      Tracey Nelson is an 83-year-old female with history of coronary artery disease, left bundle branch block, hypertension, peripheral artery disease was brought to the emergency room by her friend after she sustained a fall and developed left hip pain.  Patient was with her friend who brought her to the emergency room.  Patient apparently was going to get an echocardiogram done at [place] and her friend was driving but this happened while she was getting into the car.  Patient apparently hit her left forehead as well.  Patient states that she does have peripheral arterial disease and takes aspirin and has had stents in her legs.  She denies any prior history of coronary artery disease or coronary stents.  She is currently complaining of mild headache and left hip pain.    In the emergency room, she was afebrile and hemodynamically stable except for elevated blood pressure of 164/58.  Initial pulse oxygen saturation was 94% on room air but she subsequently dropped to 87% and had to be placed on 2 L of nasal cannula oxygen.  Blood work including CMP and CBC was unremarkable except for a sodium of 132 and a hemoglobin of 9.2 (baseline 11).  INR 1.19.  CT of the head and CT of the cervical spine were unremarkable.  Chest x-ray showed abnormal retrocardiac density left lung base which she has been following up with Dr. Ramirez.  X-ray of the hip with pelvis showed left subcapital femoral neck fracture.  Patient's condition was discussed with Dr. Gomez and the patient was subsequently admitted to the medical floor for treatment of left hip  fracture.    Review Of Systems:    All systems were reviewed and negative except as mentioned in history of presenting illness, assessment and plan.    Past Medical History: Patient  has a past medical history of Allergies, Bronchitis, Cataract, bilateral, Coronary artery disease, Hyperlipidemia, LBBB (left bundle branch block) (2018), Myocardial infarction, PAD (peripheral artery disease), Pneumonia, Skin cancer, and Wears dentures.    Past Surgical History: Patient  has a past surgical history that includes Tonsillectomy; Carotid Endarterectomy (Left); Other surgical history (Bilateral); Cataract extraction w/ intraocular lens  implant, bilateral; Skin cancer excision; Colonoscopy; Skin biopsy; and Dilation and curettage, diagnostic / therapeutic.    Social History: Patient  reports that she has been smoking cigarettes. She has a 20.00 pack-year smoking history. She has never used smokeless tobacco. Drug use questions deferred to the physician. She reports that she does not drink alcohol.    Family History:  Patient denies any significant family medical history.    Allergies:      Patient has no known allergies.    Home Medications:    Prior to Admission Medications       Prescriptions Last Dose Informant Patient Reported? Taking?    amLODIPine (NORVASC) 2.5 MG tablet  Self Yes No    every night at bedtime.    Aspirin 81 MG capsule  Self Yes No    aspirin 80 mg tablet   Take 1 tablet every day by oral route.    azithromycin (ZITHROMAX) 250 MG tablet   No No    Take 2 tablets the first day then 1 tablet a day for 4 days thereafter.    Patient not taking:  Reported on 8/30/2023    benzonatate (TESSALON) 200 MG capsule   Yes No    Take 200 mg by mouth 3 (Three) Times a Day As Needed.    Patient not taking:  Reported on 8/30/2023    busPIRone (BUSPAR) 10 MG tablet  Self Yes No    buspirone 10 mg tablet   Two times a day as needed    Cholecalciferol (Vitamin D3) 125 MCG (5000 UT) tablet dispersible  Self Yes No     Vitamin D3 2,000 unit tablet   Take 1 tablet every day by oral route for 30 days.    Patient not taking:  Reported on 8/31/2023    Cholecalciferol 50 MCG (2000 UT) tablet  Self Yes No    Vitamin D3 50 mcg (2,000 unit) tablet   Take 1 tablet every day by oral route for 30 days.    cilostazol (PLETAL) 50 MG tablet   Yes No    cilostazol 50 mg tablet   To take 1 tablet BID    cilostazol (PLETAL) 50 MG tablet  Self Yes No    Take 1 tablet by mouth 2 (Two) Times a Day.    cyanocobalamin (VITAMIN B-12) 250 MCG tablet  Self Yes No    Vitamin B-12 250 mcg tablet   Take 1 Daily    doxazosin (CARDURA) 2 MG tablet  Self Yes No    Take 1 tablet(s) every day by oral route for 90 days.    Patient not taking:  Reported on 8/31/2023    Fluticasone-Umeclidin-Vilant (Trelegy Ellipta) 200-62.5-25 MCG/ACT aerosol powder   Self Yes No    Inhale Daily With Lunch.    furosemide (LASIX) 20 MG tablet   No No    Take 1 tablet once daily as needed for edema    ipratropium-albuterol (DUO-NEB) 0.5-2.5 mg/3 ml nebulizer  Self No No    Take 3 mL by nebulization 4 (Four) Times a Day As Needed for Wheezing or Shortness of Air.    lisinopril (PRINIVIL,ZESTRIL) 30 MG tablet   Yes No    Patient not taking:  Reported on 8/30/2023    losartan (COZAAR) 50 MG tablet  Self Yes No    Take 1 tablet by mouth Daily.    metoprolol tartrate (LOPRESSOR) 50 MG tablet   Yes No    Take 1 tablet by mouth 2 (Two) Times a Day.    mirtazapine (REMERON) 15 MG tablet   Yes No    Patient not taking:  Reported on 8/30/2023    Nebulizer device   No No    1 Device Take As Directed.    Patient not taking:  Reported on 8/30/2023    simvastatin (ZOCOR) 40 MG tablet   Yes No    simvastatin (ZOCOR) 40 MG tablet  Self Yes No    Take 1 tablet by mouth every night at bedtime.    vitamin B-12 (CYANOCOBALAMIN) 100 MCG tablet  Self Yes No    Vitamin B-12 250 mcg tablet   Take 1 Daily          ED Medications:    Medications   sodium chloride 0.9 % flush 10 mL (has no administration in  "time range)   methocarbamol (ROBAXIN) tablet 750 mg (750 mg Oral Given 2/19/24 1407)     Vital Signs:  Temp:  [98.5 °F (36.9 °C)] 98.5 °F (36.9 °C)  Heart Rate:  [85-97] 88  Resp:  [16] 16  BP: (164)/(58) 164/58        02/19/24  1337   Weight: 42.6 kg (94 lb)     Body mass index is 15.64 kg/m².    Physical Exam:     Most recent vital Signs: /58 (BP Location: Left arm, Patient Position: Sitting)   Pulse 88   Temp 98.5 °F (36.9 °C) (Oral)   Resp 16   Ht 165.1 cm (65\")   Wt 42.6 kg (94 lb)   SpO2 (!) 89%   BMI 15.64 kg/m²     Physical Exam  Constitutional:       General: She is not in acute distress.     Appearance: Normal appearance. She is not ill-appearing.   HENT:      Head: Normocephalic.      Comments: Small skin abrasion noted on the left forehead next to the left eye     Right Ear: External ear normal.      Left Ear: External ear normal.      Nose: Nose normal.      Mouth/Throat:      Mouth: Mucous membranes are moist.   Eyes:      Extraocular Movements: Extraocular movements intact.      Conjunctiva/sclera: Conjunctivae normal.   Cardiovascular:      Rate and Rhythm: Normal rate and regular rhythm.      Pulses: Normal pulses.      Heart sounds: Normal heart sounds. No murmur heard.  Pulmonary:      Effort: Pulmonary effort is normal.      Breath sounds: Normal breath sounds. No wheezing or rales.   Abdominal:      General: Bowel sounds are normal.      Palpations: Abdomen is soft.      Tenderness: There is no abdominal tenderness. There is no guarding or rebound.   Musculoskeletal:      Cervical back: Neck supple.      Right lower leg: Edema present.      Left lower leg: Edema present.      Comments: Tenderness noted in the left hip area. Bilateral 1+ pitting edema noted in the ankles. left leg is externally rotated.   Skin:     General: Skin is dry.      Findings: Bruising present. No erythema or rash.   Neurological:      General: No focal deficit present.      Mental Status: She is alert and " oriented to person, place, and time. Mental status is at baseline.   Psychiatric:         Mood and Affect: Mood normal.         Behavior: Behavior normal.       Laboratory data:    I have reviewed the labs done in the emergency room.    Results from last 7 days   Lab Units 02/19/24  1546   SODIUM mmol/L 132*   POTASSIUM mmol/L 3.7   CHLORIDE mmol/L 96*   CO2 mmol/L 23.3   BUN mg/dL 9   CREATININE mg/dL 0.42*   CALCIUM mg/dL 9.2   GLUCOSE mg/dL 96     Results from last 7 days   Lab Units 02/19/24  1546   WBC 10*3/mm3 8.88   HEMOGLOBIN g/dL 9.2*   HEMATOCRIT % 29.9*   PLATELETS 10*3/mm3 336     Results from last 7 days   Lab Units 02/19/24  1546   INR  1.19*     EKG:      EKG done in the emergency room was reviewed by me.  It shows sinus rhythm at 87 bpm.  Normal axis.  Q waves in noted in V1 to V3.  Left bundle branch block noted with nonspecific ST-T changes.    Radiology:    XR Chest 1 View    Result Date: 2/19/2024  PROCEDURE: XR CHEST 1 VW-  HISTORY: Preoperative for respiratory clearance.  COMPARISON: 10/16/2022 and 12/24/2023.  FINDINGS:  Portable view of the chest demonstrates chronic changes right infrahilar region. There is underlying emphysema. Retrocardiac density left lung base is noted new from prior exam which may represent pneumonia although mass is not excluded. Small left pleural effusion is suspected. The mediastinum is unremarkable.  The heart size is normal.      Abnormal retrocardiac density left lung base. Given chronicity consider contrast-enhanced chest CT to further characterize.    This report was signed and finalized on 2/19/2024 3:27 PM by Ahsan Hui MD.      XR Hip With or Without Pelvis 2 - 3 View Left    Result Date: 2/19/2024  PROCEDURE: XR HIP W OR WO PELVIS 2-3 VIEW LEFT-  HISTORY: trauma, pain  FINDINGS:  Two views show fracture of the subcapital left femoral neck. There is mild impaction and displacement.  The joint spaces appear normal.       Subcapital left femoral neck  fracture as above.    This report was signed and finalized on 2/19/2024 2:37 PM by Ahsan Hui MD.      CT Cervical Spine Without Contrast    Result Date: 2/19/2024  PROCEDURE: CT CERVICAL SPINE WO CONTRAST-  HISTORY: Neck trauma (Age >= 65y), neck pain  TECHNIQUE: Thin section axial CT with sagittal and coronal reconstructions  FINDINGS: No fracture is present. Retrolisthesis C3-4 measures 3.5 mm. Remaining alignment is normal. Moderate diffuse degenerative disc disease is noted. Canal stenosis is seen C4-5. There is multilevel bony neural foraminal narrowing..      No fracture   This study was performed with techniques to keep radiation doses as low as reasonably achievable (ALARA). Individualized dose reduction techniques using automated exposure control or adjustment of vA and/or kV according to the patient size were employed.  This report was signed and finalized on 2/19/2024 2:28 PM by Ahsan Hui MD.      CT Head Without Contrast    Result Date: 2/19/2024  PROCEDURE: CT HEAD WO CONTRAST-  HISTORY: Head trauma, minor (Age >= 65y)  TECHNIQUE: Noncontrast exam  FINDINGS: Moderate atrophy and chronic ischemic white matter changes are noted.  No cortical edema is present. There is no mass or hemorrhage. Ventricles are normal.  Bone windows show no skull fracture or obvious destructive lesion.      1. No acute intracranial abnormality or obvious mass. 2. Atrophy and chronic ischemic white matter changes as above.   This study was performed with techniques to keep radiation doses as low as reasonably achievable (ALARA). Individualized dose reduction techniques using automated exposure control or adjustment of vA and/or kV according to the patient size were employed.    This report was signed and finalized on 2/19/2024 2:27 PM by Ahsan Hui MD.       Assessment:    Status post mechanical fall and left closed hip fracture, POA.  Peripheral artery disease with previous peripheral stents.  Coronary artery disease  on medical therapy.  Essential hypertension.  COPD, no exacerbation.    Plan:    Left hip fracture.  - Continue morphine, Lortab and Zofran for symptomatic control.  - We will consult orthopedic services.  - Keep n.p.o. after midnight.  - Due to her history of peripheral artery disease and CAD, we will consult cardiology for preoperative clearance.  - 2D echocardiogram.    PAD/CAD  - Continue home medications including aspirin, Pletal, simvastatin.    COPD.  - No evidence of exacerbation.  - Continue bronchodilators as needed.    I have discussed the patient's condition and treatment plan with her son and other family members were at the bedside.    Risk Assessment: Moderate  DVT Prophylaxis: Lovenox  Code Status: Full  Diet: Cardiac, n.p.o. after midnight.      Jhon Burnett MD  02/19/24  16:51 EST    Dictated utilizing Dragon dictation.

## 2024-02-20 ENCOUNTER — APPOINTMENT (OUTPATIENT)
Dept: GENERAL RADIOLOGY | Facility: HOSPITAL | Age: 84
DRG: 521 | End: 2024-02-20
Payer: MEDICARE

## 2024-02-20 ENCOUNTER — ANESTHESIA EVENT (OUTPATIENT)
Dept: PERIOP | Facility: HOSPITAL | Age: 84
End: 2024-02-20
Payer: MEDICARE

## 2024-02-20 ENCOUNTER — ANESTHESIA EVENT CONVERTED (OUTPATIENT)
Dept: ANESTHESIOLOGY | Facility: HOSPITAL | Age: 84
DRG: 521 | End: 2024-02-20
Payer: MEDICARE

## 2024-02-20 ENCOUNTER — ANESTHESIA (OUTPATIENT)
Dept: PERIOP | Facility: HOSPITAL | Age: 84
End: 2024-02-20
Payer: MEDICARE

## 2024-02-20 ENCOUNTER — APPOINTMENT (OUTPATIENT)
Dept: CARDIOLOGY | Facility: HOSPITAL | Age: 84
DRG: 521 | End: 2024-02-20
Payer: MEDICARE

## 2024-02-20 ENCOUNTER — APPOINTMENT (OUTPATIENT)
Dept: CT IMAGING | Facility: HOSPITAL | Age: 84
DRG: 521 | End: 2024-02-20
Payer: MEDICARE

## 2024-02-20 LAB
ANION GAP SERPL CALCULATED.3IONS-SCNC: 11 MMOL/L (ref 5–15)
BH CV ECHO LEFT VENTRICLE GLOBAL LONGITUDINAL STRAIN: -14.6 %
BH CV ECHO MEAS - AO MAX PG: 10.1 MMHG
BH CV ECHO MEAS - AO MEAN PG: 5 MMHG
BH CV ECHO MEAS - AO ROOT DIAM: 2.6 CM
BH CV ECHO MEAS - AO V2 MAX: 159 CM/SEC
BH CV ECHO MEAS - AO V2 VTI: 30.9 CM
BH CV ECHO MEAS - AVA(I,D): 1.71 CM2
BH CV ECHO MEAS - EDV(CUBED): 44.4 ML
BH CV ECHO MEAS - EDV(MOD-SP2): 42.9 ML
BH CV ECHO MEAS - EDV(MOD-SP4): 51.9 ML
BH CV ECHO MEAS - EF(MOD-BP): 52.4 %
BH CV ECHO MEAS - EF(MOD-SP2): 44.8 %
BH CV ECHO MEAS - EF(MOD-SP4): 52.2 %
BH CV ECHO MEAS - EF_3D-VOL: 55 %
BH CV ECHO MEAS - ESV(CUBED): 9.8 ML
BH CV ECHO MEAS - ESV(MOD-SP2): 23.7 ML
BH CV ECHO MEAS - ESV(MOD-SP4): 24.8 ML
BH CV ECHO MEAS - FS: 39.5 %
BH CV ECHO MEAS - IVS/LVPW: 0.96 CM
BH CV ECHO MEAS - IVSD: 1.11 CM
BH CV ECHO MEAS - LA DIMENSION: 2.6 CM
BH CV ECHO MEAS - LAT PEAK E' VEL: 4.5 CM/SEC
BH CV ECHO MEAS - LV DIASTOLIC VOL/BSA (35-75): 31.3 CM2
BH CV ECHO MEAS - LV MASS(C)D: 126.9 GRAMS
BH CV ECHO MEAS - LV MAX PG: 7.1 MMHG
BH CV ECHO MEAS - LV MEAN PG: 4 MMHG
BH CV ECHO MEAS - LV SYSTOLIC VOL/BSA (12-30): 15 CM2
BH CV ECHO MEAS - LV V1 MAX: 133 CM/SEC
BH CV ECHO MEAS - LV V1 VTI: 28.8 CM
BH CV ECHO MEAS - LVIDD: 3.5 CM
BH CV ECHO MEAS - LVIDS: 2.14 CM
BH CV ECHO MEAS - LVOT AREA: 1.84 CM2
BH CV ECHO MEAS - LVOT DIAM: 1.53 CM
BH CV ECHO MEAS - LVPWD: 1.16 CM
BH CV ECHO MEAS - MED PEAK E' VEL: 5.8 CM/SEC
BH CV ECHO MEAS - MV A MAX VEL: 114 CM/SEC
BH CV ECHO MEAS - MV DEC SLOPE: 284 CM/SEC2
BH CV ECHO MEAS - MV DEC TIME: 0.29 SEC
BH CV ECHO MEAS - MV E MAX VEL: 82.5 CM/SEC
BH CV ECHO MEAS - MV E/A: 0.72
BH CV ECHO MEAS - MV MAX PG: 5.4 MMHG
BH CV ECHO MEAS - MV MEAN PG: 3 MMHG
BH CV ECHO MEAS - MV V2 VTI: 27 CM
BH CV ECHO MEAS - MVA(VTI): 1.96 CM2
BH CV ECHO MEAS - PA ACC TIME: 0.12 SEC
BH CV ECHO MEAS - PA V2 MAX: 129 CM/SEC
BH CV ECHO MEAS - PULM DIAS VEL: 27.2 CM/SEC
BH CV ECHO MEAS - PULM S/D: 1.94
BH CV ECHO MEAS - PULM SYS VEL: 52.9 CM/SEC
BH CV ECHO MEAS - RAP SYSTOLE: 15 MMHG
BH CV ECHO MEAS - RV MAX PG: 3.7 MMHG
BH CV ECHO MEAS - RV V1 MAX: 96.8 CM/SEC
BH CV ECHO MEAS - RV V1 VTI: 20.7 CM
BH CV ECHO MEAS - RVSP: 52 MMHG
BH CV ECHO MEAS - SI(MOD-SP2): 11.6 ML/M2
BH CV ECHO MEAS - SI(MOD-SP4): 16.3 ML/M2
BH CV ECHO MEAS - SV(LVOT): 52.9 ML
BH CV ECHO MEAS - SV(MOD-SP2): 19.2 ML
BH CV ECHO MEAS - SV(MOD-SP4): 27.1 ML
BH CV ECHO MEAS - TAPSE (>1.6): 2.12 CM
BH CV ECHO MEAS - TR MAX PG: 37 MMHG
BH CV ECHO MEAS - TR MAX VEL: 303 CM/SEC
BH CV ECHO MEASUREMENTS AVERAGE E/E' RATIO: 16.02
BH CV XLRA - RV BASE: 3.3 CM
BH CV XLRA - RV LENGTH: 6.3 CM
BH CV XLRA - RV MID: 3.6 CM
BH CV XLRA - TDI S': 14.3 CM/SEC
BUN SERPL-MCNC: 9 MG/DL (ref 8–23)
BUN/CREAT SERPL: 18 (ref 7–25)
CALCIUM SPEC-SCNC: 9.5 MG/DL (ref 8.6–10.5)
CHLORIDE SERPL-SCNC: 98 MMOL/L (ref 98–107)
CO2 SERPL-SCNC: 24 MMOL/L (ref 22–29)
CREAT SERPL-MCNC: 0.5 MG/DL (ref 0.57–1)
DEPRECATED RDW RBC AUTO: 51 FL (ref 37–54)
EGFRCR SERPLBLD CKD-EPI 2021: 92.6 ML/MIN/1.73
ERYTHROCYTE [DISTWIDTH] IN BLOOD BY AUTOMATED COUNT: 16.6 % (ref 12.3–15.4)
GEN 5 2HR TROPONIN T REFLEX: 18 NG/L
GLUCOSE SERPL-MCNC: 115 MG/DL (ref 65–99)
HCT VFR BLD AUTO: 32.9 % (ref 34–46.6)
HGB BLD-MCNC: 9.8 G/DL (ref 12–15.9)
LEFT ATRIUM VOLUME INDEX: 10.3 ML/M2
MCH RBC QN AUTO: 25 PG (ref 26.6–33)
MCHC RBC AUTO-ENTMCNC: 29.8 G/DL (ref 31.5–35.7)
MCV RBC AUTO: 83.9 FL (ref 79–97)
PLATELET # BLD AUTO: 351 10*3/MM3 (ref 140–450)
PMV BLD AUTO: 8.7 FL (ref 6–12)
POTASSIUM SERPL-SCNC: 3.8 MMOL/L (ref 3.5–5.2)
RBC # BLD AUTO: 3.92 10*6/MM3 (ref 3.77–5.28)
SODIUM SERPL-SCNC: 133 MMOL/L (ref 136–145)
TROPONIN T DELTA: -1 NG/L
TROPONIN T SERPL HS-MCNC: 19 NG/L
WBC NRBC COR # BLD AUTO: 8.67 10*3/MM3 (ref 3.4–10.8)

## 2024-02-20 PROCEDURE — 25010000002 MORPHINE PER 10 MG: Performed by: INTERNAL MEDICINE

## 2024-02-20 PROCEDURE — 25010000002 BUPIVACAINE (PF) 0.25 % SOLUTION: Performed by: ORTHOPAEDIC SURGERY

## 2024-02-20 PROCEDURE — 25010000002 PROPOFOL 10 MG/ML EMULSION: Performed by: NURSE ANESTHETIST, CERTIFIED REGISTERED

## 2024-02-20 PROCEDURE — C1776 JOINT DEVICE (IMPLANTABLE): HCPCS | Performed by: ORTHOPAEDIC SURGERY

## 2024-02-20 PROCEDURE — 99232 SBSQ HOSP IP/OBS MODERATE 35: CPT | Performed by: STUDENT IN AN ORGANIZED HEALTH CARE EDUCATION/TRAINING PROGRAM

## 2024-02-20 PROCEDURE — 94761 N-INVAS EAR/PLS OXIMETRY MLT: CPT

## 2024-02-20 PROCEDURE — 71045 X-RAY EXAM CHEST 1 VIEW: CPT

## 2024-02-20 PROCEDURE — 93356 MYOCRD STRAIN IMG SPCKL TRCK: CPT

## 2024-02-20 PROCEDURE — 25010000002 ONDANSETRON PER 1 MG: Performed by: NURSE ANESTHETIST, CERTIFIED REGISTERED

## 2024-02-20 PROCEDURE — 94799 UNLISTED PULMONARY SVC/PX: CPT

## 2024-02-20 PROCEDURE — 93356 MYOCRD STRAIN IMG SPCKL TRCK: CPT | Performed by: INTERNAL MEDICINE

## 2024-02-20 PROCEDURE — 25010000002 METOCLOPRAMIDE PER 10 MG: Performed by: NURSE ANESTHETIST, CERTIFIED REGISTERED

## 2024-02-20 PROCEDURE — 25810000003 LACTATED RINGERS PER 1000 ML: Performed by: ORTHOPAEDIC SURGERY

## 2024-02-20 PROCEDURE — 25010000002 DEXAMETHASONE PER 1 MG: Performed by: NURSE ANESTHETIST, CERTIFIED REGISTERED

## 2024-02-20 PROCEDURE — 99222 1ST HOSP IP/OBS MODERATE 55: CPT | Performed by: INTERNAL MEDICINE

## 2024-02-20 PROCEDURE — 93306 TTE W/DOPPLER COMPLETE: CPT

## 2024-02-20 PROCEDURE — 25010000002 HEPARIN (PORCINE) PER 1000 UNITS: Performed by: ORTHOPAEDIC SURGERY

## 2024-02-20 PROCEDURE — 73501 X-RAY EXAM HIP UNI 1 VIEW: CPT

## 2024-02-20 PROCEDURE — 25010000002 CEFAZOLIN SODIUM-DEXTROSE 2-3 GM-%(50ML) RECONSTITUTED SOLUTION: Performed by: ORTHOPAEDIC SURGERY

## 2024-02-20 PROCEDURE — 88311 DECALCIFY TISSUE: CPT

## 2024-02-20 PROCEDURE — 25510000001 IOPAMIDOL 61 % SOLUTION: Performed by: STUDENT IN AN ORGANIZED HEALTH CARE EDUCATION/TRAINING PROGRAM

## 2024-02-20 PROCEDURE — 84484 ASSAY OF TROPONIN QUANT: CPT | Performed by: INTERNAL MEDICINE

## 2024-02-20 PROCEDURE — 88305 TISSUE EXAM BY PATHOLOGIST: CPT

## 2024-02-20 PROCEDURE — 25010000002 BUPIVACAINE (PF) 0.5 % SOLUTION: Performed by: NURSE ANESTHETIST, CERTIFIED REGISTERED

## 2024-02-20 PROCEDURE — 25010000002 DEXAMETHASONE SODIUM PHOSPHATE 10 MG/ML SOLUTION: Performed by: NURSE ANESTHETIST, CERTIFIED REGISTERED

## 2024-02-20 PROCEDURE — 80048 BASIC METABOLIC PNL TOTAL CA: CPT | Performed by: INTERNAL MEDICINE

## 2024-02-20 PROCEDURE — 0SRS01Z REPLACEMENT OF LEFT HIP JOINT, FEMORAL SURFACE WITH METAL SYNTHETIC SUBSTITUTE, OPEN APPROACH: ICD-10-PCS | Performed by: ORTHOPAEDIC SURGERY

## 2024-02-20 PROCEDURE — 71275 CT ANGIOGRAPHY CHEST: CPT

## 2024-02-20 PROCEDURE — 73502 X-RAY EXAM HIP UNI 2-3 VIEWS: CPT

## 2024-02-20 PROCEDURE — 25010000002 SUGAMMADEX 200 MG/2ML SOLUTION: Performed by: NURSE ANESTHETIST, CERTIFIED REGISTERED

## 2024-02-20 PROCEDURE — 93306 TTE W/DOPPLER COMPLETE: CPT | Performed by: INTERNAL MEDICINE

## 2024-02-20 PROCEDURE — 94664 DEMO&/EVAL PT USE INHALER: CPT

## 2024-02-20 PROCEDURE — 85027 COMPLETE CBC AUTOMATED: CPT | Performed by: INTERNAL MEDICINE

## 2024-02-20 PROCEDURE — 25810000003 SODIUM CHLORIDE 0.9 % SOLUTION: Performed by: STUDENT IN AN ORGANIZED HEALTH CARE EDUCATION/TRAINING PROGRAM

## 2024-02-20 DEVICE — DEV CONTRL TISS STRATAFIX SYMM PDS PLUS VIL CT-1 45CM: Type: IMPLANTABLE DEVICE | Site: HIP | Status: FUNCTIONAL

## 2024-02-20 DEVICE — SELF CENTERING BI-POLAR HEAD 28MM ID 48MM OD
Type: IMPLANTABLE DEVICE | Site: HIP | Status: FUNCTIONAL
Brand: SELF CENTERING

## 2024-02-20 DEVICE — ARTICUL/EZE FEMORAL HEAD DIAMETER 28MM +1.5 12/14 TAPER
Type: IMPLANTABLE DEVICE | Site: HIP | Status: FUNCTIONAL
Brand: ARTICUL/EZE

## 2024-02-20 DEVICE — CORAIL HIP SYSTEM CEMENTLESS FEMORAL STEM 12/14 AMT 135 DEGREES KA SIZE 15 HA COATED STANDARD COLLAR
Type: IMPLANTABLE DEVICE | Site: HIP | Status: FUNCTIONAL
Brand: CORAIL

## 2024-02-20 DEVICE — CAP BIPOL HIP CORAIL ACTIS: Type: IMPLANTABLE DEVICE | Status: FUNCTIONAL

## 2024-02-20 RX ORDER — HEPARIN SODIUM 5000 [USP'U]/ML
5000 INJECTION, SOLUTION INTRAVENOUS; SUBCUTANEOUS EVERY 8 HOURS SCHEDULED
Status: DISCONTINUED | OUTPATIENT
Start: 2024-02-20 | End: 2024-02-24 | Stop reason: HOSPADM

## 2024-02-20 RX ORDER — BISACODYL 5 MG/1
5 TABLET, DELAYED RELEASE ORAL DAILY PRN
Status: DISCONTINUED | OUTPATIENT
Start: 2024-02-20 | End: 2024-02-24 | Stop reason: HOSPADM

## 2024-02-20 RX ORDER — MIDAZOLAM HYDROCHLORIDE 2 MG/2ML
2 INJECTION, SOLUTION INTRAMUSCULAR; INTRAVENOUS ONCE
Status: DISCONTINUED | OUTPATIENT
Start: 2024-02-20 | End: 2024-02-20 | Stop reason: HOSPADM

## 2024-02-20 RX ORDER — CEFAZOLIN SODIUM 2 G/50ML
2000 SOLUTION INTRAVENOUS ONCE
Status: COMPLETED | OUTPATIENT
Start: 2024-02-20 | End: 2024-02-20

## 2024-02-20 RX ORDER — FAMOTIDINE 20 MG/1
40 TABLET, FILM COATED ORAL DAILY
Status: DISCONTINUED | OUTPATIENT
Start: 2024-02-20 | End: 2024-02-24 | Stop reason: HOSPADM

## 2024-02-20 RX ORDER — METOPROLOL TARTRATE 1 MG/ML
INJECTION, SOLUTION INTRAVENOUS AS NEEDED
Status: DISCONTINUED | OUTPATIENT
Start: 2024-02-20 | End: 2024-02-20 | Stop reason: SURG

## 2024-02-20 RX ORDER — SODIUM CHLORIDE 9 MG/ML
50 INJECTION, SOLUTION INTRAVENOUS CONTINUOUS
Status: DISCONTINUED | OUTPATIENT
Start: 2024-02-20 | End: 2024-02-24 | Stop reason: HOSPADM

## 2024-02-20 RX ORDER — POLYETHYLENE GLYCOL 3350 17 G/17G
17 POWDER, FOR SOLUTION ORAL DAILY PRN
Status: DISCONTINUED | OUTPATIENT
Start: 2024-02-20 | End: 2024-02-24 | Stop reason: HOSPADM

## 2024-02-20 RX ORDER — DEXAMETHASONE SODIUM PHOSPHATE 10 MG/ML
INJECTION, SOLUTION INTRAMUSCULAR; INTRAVENOUS AS NEEDED
Status: DISCONTINUED | OUTPATIENT
Start: 2024-02-20 | End: 2024-02-20 | Stop reason: SURG

## 2024-02-20 RX ORDER — SODIUM CHLORIDE 9 MG/ML
42 INJECTION, SOLUTION INTRAVENOUS CONTINUOUS
Status: DISCONTINUED | OUTPATIENT
Start: 2024-02-20 | End: 2024-02-20

## 2024-02-20 RX ORDER — KETAMINE HCL IN NACL, ISO-OSM 100MG/10ML
SYRINGE (ML) INJECTION AS NEEDED
Status: DISCONTINUED | OUTPATIENT
Start: 2024-02-20 | End: 2024-02-20 | Stop reason: SURG

## 2024-02-20 RX ORDER — ROCURONIUM BROMIDE 50 MG/5 ML
SYRINGE (ML) INTRAVENOUS AS NEEDED
Status: DISCONTINUED | OUTPATIENT
Start: 2024-02-20 | End: 2024-02-20 | Stop reason: SURG

## 2024-02-20 RX ORDER — NALOXONE HCL 0.4 MG/ML
0.4 VIAL (ML) INJECTION
Status: DISCONTINUED | OUTPATIENT
Start: 2024-02-20 | End: 2024-02-24 | Stop reason: HOSPADM

## 2024-02-20 RX ORDER — ONDANSETRON 4 MG/1
4 TABLET, ORALLY DISINTEGRATING ORAL EVERY 6 HOURS PRN
Status: DISCONTINUED | OUTPATIENT
Start: 2024-02-20 | End: 2024-02-24 | Stop reason: HOSPADM

## 2024-02-20 RX ORDER — ONDANSETRON 2 MG/ML
INJECTION INTRAMUSCULAR; INTRAVENOUS AS NEEDED
Status: DISCONTINUED | OUTPATIENT
Start: 2024-02-20 | End: 2024-02-20 | Stop reason: SURG

## 2024-02-20 RX ORDER — SODIUM CHLORIDE 0.9 % (FLUSH) 0.9 %
10 SYRINGE (ML) INJECTION AS NEEDED
Status: DISCONTINUED | OUTPATIENT
Start: 2024-02-20 | End: 2024-02-24 | Stop reason: HOSPADM

## 2024-02-20 RX ORDER — IPRATROPIUM BROMIDE AND ALBUTEROL SULFATE 2.5; .5 MG/3ML; MG/3ML
3 SOLUTION RESPIRATORY (INHALATION) ONCE
Status: DISCONTINUED | OUTPATIENT
Start: 2024-02-20 | End: 2024-02-20 | Stop reason: HOSPADM

## 2024-02-20 RX ORDER — ONDANSETRON 2 MG/ML
4 INJECTION INTRAMUSCULAR; INTRAVENOUS ONCE AS NEEDED
Status: DISCONTINUED | OUTPATIENT
Start: 2024-02-20 | End: 2024-02-20 | Stop reason: HOSPADM

## 2024-02-20 RX ORDER — MAGNESIUM HYDROXIDE 1200 MG/15ML
LIQUID ORAL AS NEEDED
Status: DISCONTINUED | OUTPATIENT
Start: 2024-02-20 | End: 2024-02-20 | Stop reason: HOSPADM

## 2024-02-20 RX ORDER — PROPOFOL 10 MG/ML
VIAL (ML) INTRAVENOUS AS NEEDED
Status: DISCONTINUED | OUTPATIENT
Start: 2024-02-20 | End: 2024-02-20 | Stop reason: SURG

## 2024-02-20 RX ORDER — IPRATROPIUM BROMIDE AND ALBUTEROL SULFATE 2.5; .5 MG/3ML; MG/3ML
3 SOLUTION RESPIRATORY (INHALATION) ONCE
Status: COMPLETED | OUTPATIENT
Start: 2024-02-20 | End: 2024-02-20

## 2024-02-20 RX ORDER — METOCLOPRAMIDE HYDROCHLORIDE 5 MG/ML
INJECTION INTRAMUSCULAR; INTRAVENOUS AS NEEDED
Status: DISCONTINUED | OUTPATIENT
Start: 2024-02-20 | End: 2024-02-20 | Stop reason: SURG

## 2024-02-20 RX ORDER — BUPIVACAINE HYDROCHLORIDE 2.5 MG/ML
INJECTION, SOLUTION EPIDURAL; INFILTRATION; INTRACAUDAL AS NEEDED
Status: DISCONTINUED | OUTPATIENT
Start: 2024-02-20 | End: 2024-02-20 | Stop reason: HOSPADM

## 2024-02-20 RX ORDER — BISACODYL 10 MG
10 SUPPOSITORY, RECTAL RECTAL DAILY PRN
Status: DISCONTINUED | OUTPATIENT
Start: 2024-02-20 | End: 2024-02-24 | Stop reason: HOSPADM

## 2024-02-20 RX ORDER — ENOXAPARIN SODIUM 100 MG/ML
40 INJECTION SUBCUTANEOUS NIGHTLY
Status: DISCONTINUED | OUTPATIENT
Start: 2024-02-20 | End: 2024-02-20

## 2024-02-20 RX ORDER — SODIUM CHLORIDE 9 MG/ML
40 INJECTION, SOLUTION INTRAVENOUS AS NEEDED
Status: DISCONTINUED | OUTPATIENT
Start: 2024-02-20 | End: 2024-02-24 | Stop reason: HOSPADM

## 2024-02-20 RX ORDER — MORPHINE SULFATE 2 MG/ML
1 INJECTION, SOLUTION INTRAMUSCULAR; INTRAVENOUS EVERY 4 HOURS PRN
Status: DISCONTINUED | OUTPATIENT
Start: 2024-02-20 | End: 2024-02-23

## 2024-02-20 RX ORDER — OXYCODONE HYDROCHLORIDE AND ACETAMINOPHEN 5; 325 MG/1; MG/1
1 TABLET ORAL EVERY 4 HOURS PRN
Status: DISCONTINUED | OUTPATIENT
Start: 2024-02-20 | End: 2024-02-24 | Stop reason: HOSPADM

## 2024-02-20 RX ORDER — SODIUM CHLORIDE, SODIUM LACTATE, POTASSIUM CHLORIDE, CALCIUM CHLORIDE 600; 310; 30; 20 MG/100ML; MG/100ML; MG/100ML; MG/100ML
1000 INJECTION, SOLUTION INTRAVENOUS CONTINUOUS
Status: DISCONTINUED | OUTPATIENT
Start: 2024-02-20 | End: 2024-02-20

## 2024-02-20 RX ORDER — AMOXICILLIN 250 MG
2 CAPSULE ORAL 2 TIMES DAILY
Status: DISCONTINUED | OUTPATIENT
Start: 2024-02-20 | End: 2024-02-24 | Stop reason: HOSPADM

## 2024-02-20 RX ORDER — LIDOCAINE HYDROCHLORIDE 20 MG/ML
INJECTION, SOLUTION EPIDURAL; INFILTRATION; INTRACAUDAL; PERINEURAL
Status: COMPLETED | OUTPATIENT
Start: 2024-02-20 | End: 2024-02-20

## 2024-02-20 RX ORDER — SODIUM CHLORIDE 450 MG/100ML
100 INJECTION, SOLUTION INTRAVENOUS CONTINUOUS
Status: DISCONTINUED | OUTPATIENT
Start: 2024-02-20 | End: 2024-02-20

## 2024-02-20 RX ORDER — DEXAMETHASONE SODIUM PHOSPHATE 4 MG/ML
INJECTION, SOLUTION INTRA-ARTICULAR; INTRALESIONAL; INTRAMUSCULAR; INTRAVENOUS; SOFT TISSUE AS NEEDED
Status: DISCONTINUED | OUTPATIENT
Start: 2024-02-20 | End: 2024-02-20 | Stop reason: SURG

## 2024-02-20 RX ORDER — BUPIVACAINE HYDROCHLORIDE 5 MG/ML
INJECTION, SOLUTION EPIDURAL; INTRACAUDAL
Status: COMPLETED | OUTPATIENT
Start: 2024-02-20 | End: 2024-02-20

## 2024-02-20 RX ORDER — CEFAZOLIN SODIUM 2 G/50ML
2000 SOLUTION INTRAVENOUS ONCE
Status: DISCONTINUED | OUTPATIENT
Start: 2024-02-20 | End: 2024-02-20 | Stop reason: HOSPADM

## 2024-02-20 RX ORDER — SODIUM CHLORIDE 0.9 % (FLUSH) 0.9 %
10 SYRINGE (ML) INJECTION EVERY 12 HOURS SCHEDULED
Status: DISCONTINUED | OUTPATIENT
Start: 2024-02-20 | End: 2024-02-24 | Stop reason: HOSPADM

## 2024-02-20 RX ORDER — ESCITALOPRAM OXALATE 10 MG/1
10 TABLET ORAL DAILY
COMMUNITY

## 2024-02-20 RX ORDER — CEFAZOLIN SODIUM 2 G/50ML
2000 SOLUTION INTRAVENOUS ONCE
Status: DISCONTINUED | OUTPATIENT
Start: 2024-02-20 | End: 2024-02-20

## 2024-02-20 RX ORDER — LORAZEPAM 2 MG/ML
1 INJECTION INTRAMUSCULAR EVERY 4 HOURS PRN
Status: DISCONTINUED | OUTPATIENT
Start: 2024-02-20 | End: 2024-02-20 | Stop reason: HOSPADM

## 2024-02-20 RX ADMIN — ATORVASTATIN CALCIUM 20 MG: 20 TABLET, FILM COATED ORAL at 20:28

## 2024-02-20 RX ADMIN — DEXAMETHASONE SODIUM PHOSPHATE 10 MG: 4 INJECTION, SOLUTION INTRA-ARTICULAR; INTRALESIONAL; INTRAMUSCULAR; INTRAVENOUS; SOFT TISSUE at 15:20

## 2024-02-20 RX ADMIN — Medication 10 ML: at 08:39

## 2024-02-20 RX ADMIN — IOPAMIDOL 100 ML: 612 INJECTION, SOLUTION INTRAVENOUS at 17:58

## 2024-02-20 RX ADMIN — SODIUM CHLORIDE, POTASSIUM CHLORIDE, SODIUM LACTATE AND CALCIUM CHLORIDE 1000 ML: 600; 310; 30; 20 INJECTION, SOLUTION INTRAVENOUS at 14:01

## 2024-02-20 RX ADMIN — FAMOTIDINE 40 MG: 20 TABLET, FILM COATED ORAL at 20:28

## 2024-02-20 RX ADMIN — DOCUSATE SODIUM 50MG AND SENNOSIDES 8.6MG 2 TABLET: 8.6; 5 TABLET, FILM COATED ORAL at 20:28

## 2024-02-20 RX ADMIN — METOCLOPRAMIDE 10 MG: 5 INJECTION, SOLUTION INTRAMUSCULAR; INTRAVENOUS at 15:20

## 2024-02-20 RX ADMIN — Medication 50 MG: at 15:20

## 2024-02-20 RX ADMIN — HEPARIN SODIUM 5000 UNITS: 5000 INJECTION, SOLUTION INTRAVENOUS; SUBCUTANEOUS at 22:29

## 2024-02-20 RX ADMIN — BUDESONIDE 0.5 MG: 0.5 INHALANT RESPIRATORY (INHALATION) at 19:40

## 2024-02-20 RX ADMIN — LIDOCAINE HYDROCHLORIDE 60 MG: 20 INJECTION, SOLUTION INTRAVENOUS at 15:20

## 2024-02-20 RX ADMIN — DEXAMETHASONE SODIUM PHOSPHATE 10 MG: 10 INJECTION, SOLUTION INTRAMUSCULAR; INTRAVENOUS at 14:34

## 2024-02-20 RX ADMIN — IPRATROPIUM BROMIDE AND ALBUTEROL SULFATE 3 ML: .5; 3 SOLUTION RESPIRATORY (INHALATION) at 19:40

## 2024-02-20 RX ADMIN — CILOSTAZOL 50 MG: 100 TABLET ORAL at 21:15

## 2024-02-20 RX ADMIN — ONDANSETRON 4 MG: 2 INJECTION INTRAMUSCULAR; INTRAVENOUS at 15:20

## 2024-02-20 RX ADMIN — SUGAMMADEX 200 MG: 100 INJECTION, SOLUTION INTRAVENOUS at 16:19

## 2024-02-20 RX ADMIN — BUPIVACAINE HYDROCHLORIDE 20 ML: 5 INJECTION, SOLUTION EPIDURAL; INTRACAUDAL; PERINEURAL at 14:34

## 2024-02-20 RX ADMIN — SODIUM CHLORIDE 100 ML/HR: 9 INJECTION, SOLUTION INTRAVENOUS at 12:19

## 2024-02-20 RX ADMIN — Medication 25 MG: at 15:20

## 2024-02-20 RX ADMIN — METOPROLOL TARTRATE 5 MG: 1 INJECTION, SOLUTION INTRAVENOUS at 16:31

## 2024-02-20 RX ADMIN — BUDESONIDE 0.5 MG: 0.5 INHALANT RESPIRATORY (INHALATION) at 07:01

## 2024-02-20 RX ADMIN — CEFAZOLIN SODIUM 2 G: 2 SOLUTION INTRAVENOUS at 15:24

## 2024-02-20 RX ADMIN — Medication 25 MG: at 14:23

## 2024-02-20 RX ADMIN — Medication 10 ML: at 20:28

## 2024-02-20 RX ADMIN — LIDOCAINE HYDROCHLORIDE 15 ML: 20 INJECTION, SOLUTION EPIDURAL; INFILTRATION; INTRACAUDAL; PERINEURAL at 14:34

## 2024-02-20 RX ADMIN — MORPHINE SULFATE 2 MG: 2 INJECTION, SOLUTION INTRAMUSCULAR; INTRAVENOUS at 12:19

## 2024-02-20 RX ADMIN — METOPROLOL TARTRATE 50 MG: 50 TABLET ORAL at 20:28

## 2024-02-20 RX ADMIN — PROPOFOL 100 MG: 10 INJECTION, EMULSION INTRAVENOUS at 15:20

## 2024-02-20 NOTE — CASE MANAGEMENT/SOCIAL WORK
Case Management/Social Work    Patient Name:  Tracey Nelson  YOB: 1940  MRN: [id number]  Admit Date:  2/19/2024        09:58 EST  Met with patient at bedside. Patient resting comfortably. Will discuss discharge disposition with patient after surgery.      Electronically signed by:  Scott Pérez RN  02/20/24 09:58 EST

## 2024-02-20 NOTE — OP NOTE
[facility]  [address], [address]  [place]  [address] [phone]      OPERATIVE REPORT      PATIENT NAME:  Tracey Nelson                    YOB: 1940                        PREOP DIAGNOSIS:      Left femoral neck fracture      POSTOP DIAGNOSIS:  Left femoral neck fracture    PROCEDURE:  Left hip hemiarthroplasty CPT code 82926    SURGEON:   Cedrick Gomez M.D.    FIRST ASSISTANT:  Circulator: Liz Banda RN  Radiology Technologist: Hanane Amaral  Scrub Person: Luis Fernando Tompkins; Josefa Farooq    ANESTHETIST:             CRNA: Dean Berger CRNA    ANESTHESIA:  Choice    EBL:    300 mL    URINE OUT:   None recorded    FLUIDS: 1 L crystalloid.    SPECIMENS:  None     DRAINS: None    INDICATION FOR PROCEDURE: Patient is a pleasant 83-year-old female who fell onto her left hip.  Complain of pain in her left hip.  She was seen in the emergency room diagnosed with a left femoral neck fracture.  I was called to evaluate for surgical management.  She stated that she had severe pain in her hip especially when she tried to bear weight.  No numbness tingling no radiation of pain.  X-ray showed a nondisplaced or minimally displaced left femoral neck fracture.  I explained she benefit from left hip hemiarthroplasty.  Typical risk benefits and alternatives of the procedure were explained the patient wished proceed.  Risks included bleeding infection damage to nerves and vessels need for further procedure stroke heart attack blood clot even death.    PROCEDURE IN DETAIL:  Patient was seen in the preoperative holding area.  Left lower extremity was marked.  H&P and consent was updated.  She was able to the operative suite placed in the operating table in supine position.  She then underwent general endotracheal anesthesia without complication.  She is moved lateral cubitus position held in place with a hip holders.  Left lower extremity was then prepped and draped  in normal sterile orthopedic fashion.  Timeout is performed antibiotics have been given.  Left lower extremity incision was made for the center posterior approach to the hip.  This taken down through subcutaneous tissue to identify the gluteal fascia.  The gluteal fascia was incised in line with this muscle fibers and the Charnley hip retractor was placed.  We then identified the piriformis short external rotators took these down through the posterior capsule and took the posterior capsule x 1 large flap.  This gave us good visualization of the femoral neck.  She did have a hematoma that evacuated that time.  We then dislocated the femoral head neck.  It was obvious that the femoral neck was fractured.  Once we had disassociated these we then made our femoral neck cut with a saw and remove the head.  We then trialed several sizes and trialed it to be a 48 mm bipolar head.  This had good overall coverage and good suction fit.  We then turned our attention to the femur internally rotated the femur.  We removed soft tissue from the lateral trochanteric bed and bone from the lateral femoral neck with a cookie cutter type osteotome.  We then used a canal finder get orientation the canal.  Next we began broaching starting with the starting broach and broaching up to a size 15 broach for the corail system.  We then placed a trial bipolar head and reduced the hip and the acetabulum.  Position rest was stable to 90 degrees of internal rotation.  In 90 degrees of hip flexion was stable to 90 degrees of internal rotation.  He was stable to extension and external rotation.  We took an intraoperative x-ray resized for other component position as well as her sizes and her leg lengths were equal.  We then thoroughly irrigated suctioned and dried the wound and dislocated the trial components.  We then thoroughly irrigated suctioned dry the wound.  We then impacted the real femoral stem down into place.  We checked the calcar found  to have no cracks.  We washed and dried the trunnion sterilely and then placed our bipolar head to engage the Nole taper.  We then reduced the hip and the acetabulum.  Our stability was equal to trial and there was no evidence of mechanical blocking and the leg lengths were grossly equal.  We then irrigated with Irrisept.  We let this sit for a minute.  We then thoroughly irrigated 3 L of normal saline with pulsatile lavage through the wound.  We then thoroughly dried the wound.  We then closed in layered fashion using #5 Ethibond through the piriformis short external rotators and greater trochanter to close this down.  We then injected local anesthetic.  Next we closed the gluteal fascia with #1 strata fix.  2-0 Vicryl subcutaneous tissue and 3-0 Monocryl for subcuticular layer.  Prineo dressing was applied followed by covering.  Patient was then awoken from general endotracheal anesthesia without complication taken the recovery room in stable condition.    FINDINGS:                Left femoral neck fracture                 COMPLICATIONS:  None    IMPLANTS:    depuy corail size 15 stem standard offset with a collar, 48 mm bipolar head    DISPOSITION:  Stable to recovery.  Patient be admitted to hospitalist for medical management.  She will be on DVT prophylaxis.  She worked with physical therapy weightbearing as tolerated to the left lower extremity.  Should be on posterior precautions.  Hip abduction pillow sleeping x 6 weeks.  Follow-up in clinic in 2 weeks.         Cedrick Gomez MD  2/20/2024  16:26 EST

## 2024-02-20 NOTE — ANESTHESIA PROCEDURE NOTES
Peripheral Block    Pre-sedation assessment completed: 2/20/2024 2:22 PM    Patient reassessed immediately prior to procedure    Start time: 2/20/2024 2:29 PM  Stop time: 2/20/2024 2:34 PM  Reason for block: at surgeon's request and post-op pain management  Performed by  CRNA/CAA: Dean Berger, CRNA  Preanesthetic Checklist  Completed: patient identified, IV checked, site marked, risks and benefits discussed, surgical consent, monitors and equipment checked, pre-op evaluation and timeout performed  Prep:  Pt Position: supine  Sterile barriers:cap, gloves and mask  Prep: ChloraPrep  Patient monitoring: EKG, continuous pulse oximetry and blood pressure monitoring  Procedure    Sedation: yes  Performed under: MAC  Guidance:ultrasound guided    ULTRASOUND INTERPRETATION.  Using ultrasound guidance a 21 G gauge needle was placed in close proximity to the nerve, at which point, under ultrasound guidance anesthetic was injected in the area of the nerve and spread of the anesthesia was seen on ultrasound in close proximity thereto.  There were no abnormalities seen on ultrasound; a digital image was taken; and the patient tolerated the procedure with no complications. Images:still images obtained    Laterality:left  Block Type:fascia iliaca compartment  Injection Technique:single-shot  Needle Type:echogenic  Needle Gauge:21 G  Resistance on Injection: none    Medications Used: bupivacaine PF (MARCAINE) injection 0.5% - Injection   20 mL - 2/20/2024 2:34:00 PM  lidocaine PF 2% (XYLOCAINE) injection - Injection   15 mL - 2/20/2024 2:34:00 PM      Medications  Preservative Free Saline:10ml  Comment:Adjuncts per total volume of LA:    Decadron 10 mg PSF      If required, intravenous sedation was given -- see meds on anesthesia record.    Post Assessment  Injection Assessment: negative aspiration for heme, no paresthesia on injection and incremental injection  Patient Tolerance:comfortable throughout  block  Complications:no  Additional Notes  Procedure:          FASCIA ILIACA BLOCK                                Patient analgesia was achieved with IV Sedation( see meds)      Pt was placed in the supine position.   The insertion site was prepped in sterile fashion with Chloraprep.  A BBraun echogenic needle was advance In-plane under ultrasound guidance. The Anterior superior Iliac crest was initially visualized and the probe was directed slightly medially and slightly towards the umbilicus.  The course of the needle was tracked over the sartorius muscle through the fascia Iliacus and into the anterior portion of the Iliacus muscle.  Major vessels where identified and avoided as were structures of the peritoneal cavity.  LA injection was made incrementally in 1-5 ml amounts and spread was visualized superiorly below the fascia iliacus.  Injection was completed with negative aspiration of blood and negative intravascular injection.  Injection pressures were normal or minimal resistance.

## 2024-02-20 NOTE — NURSING NOTE
"Dr Gomez arrived at the bedside to see the pt before surgery, I informed him that preop did not order the TXA because they were unsure of the contraindications. I showed him the form that preop had given me to show him and he stated \"not to worry about it, she would be fine without it\".   "

## 2024-02-20 NOTE — H&P (VIEW-ONLY)
Referring Provider: Hospitalist service  Reason for Consultation: Left hip femoral neck fracture    Patient Care Team:  Leola Wright MD as PCP - General (Internal Medicine)        History of present illness:    83-year-old female is seen today in orthopedic consultation following admission for left hip fracture.  Patient sustained a mechanical fall getting into a vehicle, preparing to travel to [facility] for an echocardiogram.  Patient landed on her left side and had immediate onset of left hip pain.  She had difficulty bearing weight thereafter.  She also impacted the left side of her forehead.  She did have some pain in the area, though no neurologic sequela.  She had no loss of consciousness.  She presented to the emergency room where x-rays revealed a left femoral neck fracture.  She was admitted to the hospital and orthopedic consultation was requested.  She does ambulate independently.  Current pain level is 7/10.  She notes increased pain with any movement of the left lower extremity.  She notes some mild pain at the left forehead and periorbital region.  She notes no visual changes, auditory changes or neurologic sequela.    Review of Systems  Pertinent items are noted in HPI, all other systems reviewed and negative    History  Past Medical History:   Diagnosis Date    Allergies     Bronchitis     Cataract, bilateral     s/p surgery    Coronary artery disease     Hyperlipidemia     LBBB (left bundle branch block) 2018    Myocardial infarction     pt denies    PAD (peripheral artery disease)     stent in each leg    Pneumonia     Skin cancer     Wears dentures     upper plate and lower is a partial   ,   Past Surgical History:   Procedure Laterality Date    CAROTID ENDARTERECTOMY Left     CATARACT EXTRACTION W/ INTRAOCULAR LENS  IMPLANT, BILATERAL      COLONOSCOPY      DILATION AND CURETTAGE, DIAGNOSTIC / THERAPEUTIC      OTHER SURGICAL HISTORY Bilateral     stent in each leg    SKIN BIOPSY       SKIN CANCER EXCISION      TONSILLECTOMY     , History reviewed. No pertinent family history.,   Social History     Socioeconomic History    Marital status:    Tobacco Use    Smoking status: Some Days     Packs/day: 0.50     Years: 40.00     Additional pack years: 0.00     Total pack years: 20.00     Types: Cigarettes    Smokeless tobacco: Never   Vaping Use    Vaping Use: Never used   Substance and Sexual Activity    Alcohol use: Never    Drug use: Defer    Sexual activity: Defer     E-cigarette/Vaping    E-cigarette/Vaping Use Never User      E-cigarette/Vaping Substances     E-cigarette/Vaping Devices         , Scheduled Meds:  aspirin, 81 mg, Oral, Daily  atorvastatin, 20 mg, Oral, Nightly  budesonide, 0.5 mg, Nebulization, BID - RT  cilostazol, 50 mg, Oral, BID  enoxaparin, 30 mg, Subcutaneous, Nightly  losartan, 50 mg, Oral, Daily  metoprolol tartrate, 50 mg, Oral, BID  senna-docusate sodium, 2 tablet, Oral, BID  sodium chloride, 10 mL, Intravenous, Q12H   , and Allergies:  Patient has no known allergies.    Objective     Vital Signs   Temp:  [98 °F (36.7 °C)-99 °F (37.2 °C)] 99 °F (37.2 °C)  Heart Rate:  [67-97] 67  Resp:  [16-18] 16  BP: (144-178)/(54-75) 153/54    Physical Exam:     General: Patient is alert and oriented x 3.  No acute distress.  HEENT: Head Exam reveals left periorbital contusion with no open wound.  Pupils are equal reactive to light and accommodating.  Neck is supple with no JVD.  Cranial nerve testing intact 2 through 12.   Cardiovascular: Intact distal pulses in all 4 extremities without edema.  Respiratory: Breaths are easy and unlabored.  No chest wall pain to palpation.  Abdomen: Soft, nontender, nondistended.  No guarding or rebound.  Neurologic: Intact sensation in all 4 extremities with intact DTRs.  Skin: Intact with no acute lesions noted.  Musculoskeletal: There is no deformity of the left lower extremity.  There is severe pain with any attempted left hip motion,  actively or passively.  Positive hip impingement.  Neurovascular status intact left lower Extremity.  No distal edema.  Negative Homans.  No pain at the right lower extremity with hip rotation.    Results Review:   I reviewed the patient's new clinical results.I reviewed the patient's radiology images/studies.      Assessment & Plan       Closed left hip fracture    Peripheral artery disease  Left subcapital femoral neck fracture.    Recommendation at this time is for hemiarthroplasty with bipolar replacement.  We discussed indications, risks, benefits and details of the procedure.  Postoperative expectations were discussed.  We will consider inpatient rehabilitation placement based on patient's progress after surgery.  All questions were answered.  Patient is already nothing by mouth.  Informed consent will be obtained.  We will continue plans for echocardiogram this morning.  Surgery will be pending clearance.      I discussed the patients findings and my recommendations with patient, nursing staff, and consulting provider      All questions were answered.  This patient was evaluated and treated under the supervision of Dr Cedrick Gomez. Dictated by Ahsan Colindres PA-C.    Ahsan Colindres PA-C  02/20/24  08:16 EST

## 2024-02-20 NOTE — PROGRESS NOTES
[facility]    Name:  Tracey Nelson   Age:  83 y.o.  Sex:  female  :  1940  MRN:  [id number]   Visit Number:  [id number]  Admission Date:  2024  Date Of Service:  24  Primary Care Physician:  Leola Wright MD     LOS: 1 day :    Chief Complaint:      Follow-up; hip pain    Subjective:    Feels good, minimal pain in her hip when she stays still.  Worse with any movement of the hip.  Happy to get surgery later today.    Hospital Course:    Tracey Nelson is an 83-year-old female with history of coronary artery disease, left bundle branch block, hypertension, peripheral artery disease was brought to the emergency room by her friend after she sustained a fall and developed left hip pain.  Patient was with her friend who brought her to the emergency room.  Patient apparently was going to get an echocardiogram done at [place] and her friend was driving but this happened while she was getting into the car.  Patient apparently hit her left forehead as well.  Patient states that she does have peripheral arterial disease and takes aspirin and has had stents in her legs.  She denies any prior history of coronary artery disease or coronary stents.  At time of admission only reporting concern of mild headache and left hip pain.     In the emergency room, she was afebrile and hemodynamically stable except for elevated blood pressure of 164/58.  Initial pulse oxygen saturation was 94% on room air but she subsequently dropped to 87% and had to be placed on 2 L of nasal cannula oxygen.  Blood work including CMP and CBC was unremarkable except for a sodium of 132 and a hemoglobin of 9.2 (baseline 11).  INR 1.19.  CT of the head and CT of the cervical spine were unremarkable.  Chest x-ray showed abnormal retrocardiac density left lung base which she has been following up with Dr. Ramirez.  X-ray of the hip with pelvis showed left subcapital femoral neck  fracture.  Patient's condition was discussed with Dr. Gomez and the patient was subsequently admitted to the medical floor for treatment of left hip fracture.    Review of Systems:     All systems were reviewed and negative except as mentioned in subjective, assessment and plan.    Vital Signs:    Temp:  [97.4 °F (36.3 °C)-99 °F (37.2 °C)] 97.4 °F (36.3 °C)  Heart Rate:  [] 107  Resp:  [15-18] 16  BP: (144-193)/() 179/87    Intake and output:    I/O last 3 completed shifts:  In: -   Out: 25 [Urine:25]  I/O this shift:  In: 800 [I.V.:800]  Out: 300 [Blood:300]    Physical Examination:    General Appearance:  Alert and cooperative.    Head:  Atraumatic and normocephalic.   Eyes: Conjunctivae and sclerae normal, no icterus. No pallor.   Throat: No oral lesions, no thrush, oral mucosa moist.   Neck: Supple, trachea midline, no thyromegaly.   Lungs:   Breath sounds heard bilaterally equally.  No wheezing or crackles. No Pleural rub or bronchial breathing.   Heart:  Normal S1 and S2, no murmur, no gallop, no rub. No JVD.   Abdomen:   Normal bowel sounds, no masses, no organomegaly. Soft, nontender, nondistended, no rebound tenderness.   Extremities: Supple, no edema, no cyanosis, no clubbing.   Skin: Warm.   Neurologic: Alert and oriented x 3. No facial asymmetry. Moves all four limbs. No tremors.      Laboratory results:    Results from last 7 days   Lab Units 02/20/24  0523 02/19/24  1546   SODIUM mmol/L 133* 132*   POTASSIUM mmol/L 3.8 3.7   CHLORIDE mmol/L 98 96*   CO2 mmol/L 24.0 23.3   BUN mg/dL 9 9   CREATININE mg/dL 0.50* 0.42*   CALCIUM mg/dL 9.5 9.2   GLUCOSE mg/dL 115* 96     Results from last 7 days   Lab Units 02/20/24  0523 02/19/24  1546   WBC 10*3/mm3 8.67 8.88   HEMOGLOBIN g/dL 9.8* 9.2*   HEMATOCRIT % 32.9* 29.9*   PLATELETS 10*3/mm3 351 336     Results from last 7 days   Lab Units 02/19/24  1546   INR  1.19*     Results from last 7 days   Lab Units 02/20/24  0707 02/20/24  0523   HSTROP T  "ng/L 18* 19*         No results for input(s): \"PHART\", \"ZOE2HEF\", \"PO2ART\", \"UIP4CZD\", \"BASEEXCESS\" in the last 8760 hours.   I have reviewed the patient's laboratory results.    Radiology results:    Peripheral Block    Result Date: 2/20/2024  Dean Berger CRNA     2/20/2024  2:41 PM Peripheral Block Pre-sedation assessment completed: 2/20/2024 2:22 PM Patient reassessed immediately prior to procedure Start time: 2/20/2024 2:29 PM Stop time: 2/20/2024 2:34 PM Reason for block: at surgeon's request and post-op pain management Performed by CRNA/CAA: Dean Berger CRNA Preanesthetic Checklist Completed: patient identified, IV checked, site marked, risks and benefits discussed, surgical consent, monitors and equipment checked, pre-op evaluation and timeout performed Prep: Pt Position: supine Sterile barriers:cap, gloves and mask Prep: ChloraPrep Patient monitoring: EKG, continuous pulse oximetry and blood pressure monitoring Procedure Sedation: yes Performed under: MAC Guidance:ultrasound guided ULTRASOUND INTERPRETATION.  Using ultrasound guidance a 21 G gauge needle was placed in close proximity to the nerve, at which point, under ultrasound guidance anesthetic was injected in the area of the nerve and spread of the anesthesia was seen on ultrasound in close proximity thereto.  There were no abnormalities seen on ultrasound; a digital image was taken; and the patient tolerated the procedure with no complications. Images:still images obtained Laterality:left Block Type:fascia iliaca compartment Injection Technique:single-shot Needle Type:echogenic Needle Gauge:21 G Resistance on Injection: none Medications Used: bupivacaine PF (MARCAINE) injection 0.5% - Injection  20 mL - 2/20/2024 2:34:00 PM lidocaine PF 2% (XYLOCAINE) injection - Injection  15 mL - 2/20/2024 2:34:00 PM Medications Preservative Free Saline:10ml Comment:Adjuncts per total volume of LA: Decadron 10 mg PSF If required, intravenous sedation " was given -- see meds on anesthesia record. Post Assessment Injection Assessment: negative aspiration for heme, no paresthesia on injection and incremental injection Patient Tolerance:comfortable throughout block Complications:no Additional Notes Procedure:          FASCIA ILIACA BLOCK                             Patient analgesia was achieved with IV Sedation( see meds)   Pt was placed in the supine position.   The insertion site was prepped in sterile fashion with Chloraprep.  A BBraun echogenic needle was advance In-plane under ultrasound guidance. The Anterior superior Iliac crest was initially visualized and the probe was directed slightly medially and slightly towards the umbilicus.  The course of the needle was tracked over the sartorius muscle through the fascia Iliacus and into the anterior portion of the Iliacus muscle.  Major vessels where identified and avoided as were structures of the peritoneal cavity.  LA injection was made incrementally in 1-5 ml amounts and spread was visualized superiorly below the fascia iliacus.  Injection was completed with negative aspiration of blood and negative intravascular injection.  Injection pressures were normal or minimal resistance.     Adult Transthoracic Echo Complete W/ Cont if Necessary Per Protocol    Result Date: 2/20/2024    Left ventricular systolic function is normal. Calculated left ventricular 3D EF = 55% Left ventricular ejection fraction appears to be 56 - 60%.   Grade II diastolic dysfunction. Left ventricular wall thickness is consistent with mild concentric hypertrophy.   Normal right ventricular size and function.   The right atrial cavity is dilated.   Mild tricuspid valve regurgitation is present. Estimated right ventricular systolic pressure from tricuspid regurgitation is moderately elevated (45-55 mmHg).   There is a moderate (1-2cm) circumferential pericardial effusion measuring largest anteriorly along the RV free wall at 1.6 cm. There is no  echocardiographic evidence of cardiac tamponade.     XR Chest 1 View    Result Date: 2/19/2024  PROCEDURE: XR CHEST 1 VW-  HISTORY: Preoperative for respiratory clearance.  COMPARISON: 10/16/2022 and 12/24/2023.  FINDINGS:  Portable view of the chest demonstrates chronic changes right infrahilar region. There is underlying emphysema. Retrocardiac density left lung base is noted new from prior exam which may represent pneumonia although mass is not excluded. Small left pleural effusion is suspected. The mediastinum is unremarkable.  The heart size is normal.      Impression: Abnormal retrocardiac density left lung base. Given chronicity consider contrast-enhanced chest CT to further characterize.    This report was signed and finalized on 2/19/2024 3:27 PM by Ahsan Hui MD.      XR Hip With or Without Pelvis 2 - 3 View Left    Result Date: 2/19/2024  PROCEDURE: XR HIP W OR WO PELVIS 2-3 VIEW LEFT-  HISTORY: trauma, pain  FINDINGS:  Two views show fracture of the subcapital left femoral neck. There is mild impaction and displacement.  The joint spaces appear normal.       Impression: Subcapital left femoral neck fracture as above.    This report was signed and finalized on 2/19/2024 2:37 PM by Ahsan Hui MD.      CT Cervical Spine Without Contrast    Result Date: 2/19/2024  PROCEDURE: CT CERVICAL SPINE WO CONTRAST-  HISTORY: Neck trauma (Age >= 65y), neck pain  TECHNIQUE: Thin section axial CT with sagittal and coronal reconstructions  FINDINGS: No fracture is present. Retrolisthesis C3-4 measures 3.5 mm. Remaining alignment is normal. Moderate diffuse degenerative disc disease is noted. Canal stenosis is seen C4-5. There is multilevel bony neural foraminal narrowing..      Impression: No fracture   This study was performed with techniques to keep radiation doses as low as reasonably achievable (ALARA). Individualized dose reduction techniques using automated exposure control or adjustment of vA and/or kV  according to the patient size were employed.  This report was signed and finalized on 2/19/2024 2:28 PM by Ahsan Hui MD.      CT Head Without Contrast    Result Date: 2/19/2024  PROCEDURE: CT HEAD WO CONTRAST-  HISTORY: Head trauma, minor (Age >= 65y)  TECHNIQUE: Noncontrast exam  FINDINGS: Moderate atrophy and chronic ischemic white matter changes are noted.  No cortical edema is present. There is no mass or hemorrhage. Ventricles are normal.  Bone windows show no skull fracture or obvious destructive lesion.      Impression: 1. No acute intracranial abnormality or obvious mass. 2. Atrophy and chronic ischemic white matter changes as above.   This study was performed with techniques to keep radiation doses as low as reasonably achievable (ALARA). Individualized dose reduction techniques using automated exposure control or adjustment of vA and/or kV according to the patient size were employed.    This report was signed and finalized on 2/19/2024 2:27 PM by Ahsan Hui MD.     I have reviewed the patient's radiology reports.    Medication Review:     I have reviewed the patient's active and prn medications.     Problem List:      Closed left hip fracture    Peripheral artery disease      Assessment:     Status post mechanical fall and left closed hip fracture, POA.  Peripheral artery disease with previous peripheral stents.  Coronary artery disease on medical therapy.  Essential hypertension.  COPD, no exacerbation.     Plan:     Comfortable in bed, pleasantly conversational.    Family updated at bedside.    Left hip fracture.  - Continue morphine, Lortab and Zofran for symptomatic control.  - We will consult orthopedic services.  - Keep n.p.o. anticipating surgery this afternoon  - Cardiology assessed moderate risk, no contraindications to surgery.  - 2D echocardiogram.     PAD/CAD  - Continue home medications including aspirin, Pletal, simvastatin.     COPD.  - No evidence of exacerbation.  - Continue  bronchodilators as needed.     Risk Assessment: Moderate  DVT Prophylaxis: Lovenox  Code Status: Full  Diet: Cardiac, n.p.o. pending surgery  Discharge Plan: At least a couple more days, pending PT/OT after surgery    Brian Joseph Kerley,   02/20/24  16:45 EST    Dictated utilizing Dragon dictation.

## 2024-02-20 NOTE — ANESTHESIA POSTPROCEDURE EVALUATION
Patient: Tracey Nelson    Procedure Summary       Date: 02/20/24 Room / Location: [facility] OR  /  [facility]    Anesthesia Start: 1514 Anesthesia Stop: 1640    Procedure: HIP BIPOLAR REPLACEMENT LEFT (Left: Hip) Diagnosis:     Surgeons: Cedrick Gomez MD Provider: Dean Berger CRNA    Anesthesia Type: general with block ASA Status: 4            Anesthesia Type: general with block    Vitals  Vitals Value Taken Time   /71 02/20/24 1650   Temp 98.7 °F (37.1 °C) 02/20/24 1643   Pulse 71 02/20/24 1652   Resp 15 02/20/24 1645   SpO2 100 % 02/20/24 1652   Vitals shown include unfiled device data.          Post Anesthesia Care and Evaluation    Patient location during evaluation: PACU  Patient participation: complete - patient participated  Level of consciousness: awake  Pain score: 3  Pain management: adequate    Airway patency: patent  Anesthetic complications: No anesthetic complications  PONV Status: controlled  Cardiovascular status: acceptable and stable  Respiratory status: acceptable and face mask  Hydration status: acceptable    Comments: See Nursing record for post procedural Vital Signs as per protocol

## 2024-02-20 NOTE — PAYOR COMM NOTE
"TO:HUMANA  FROM:SAIRA SCOTT RN PHONE [phone]  IN CLINICALS REF# [id number]    Tracey Nelson (83 y.o. Female)       Date of Birth   1940    Social Security Number       Address   [address] [place] [address]    Home Phone   [phone]    MRN   [id number]       Hindu   None    Marital Status                               Admission Date   24    Admission Type   Emergency    Admitting Provider   Jhon Burnett MD    Attending Provider   Kerley, Brian Joseph, DO    Department, Room/Bed   [facility] TELEMETRY 4, /1       Discharge Date       Discharge Disposition       Discharge Destination                                 Attending Provider: Kerley, Brian Joseph, DO    Allergies: No Known Allergies    Isolation: None   Infection: None   Code Status: CPR    Ht: 165.1 cm (65\")   Wt: 60.2 kg (132 lb 11.5 oz)    Admission Cmt: None   Principal Problem: Closed left hip fracture [S72.002A]                   Active Insurance as of 2024       Primary Coverage       Payor Plan Insurance Group Employer/Plan Group    HUMANA MEDICARE REPLACEMENT HU[employer]NA MED ADV PPO [id number]       Payor Plan Address Payor Plan Phone Number Payor Plan Fax Number Effective Dates    [address] [address] [phone]  2024 - None Entered    [facility] [id number]         Subscriber Name Subscriber Birth Date Member ID       TRACEY NELSON 1940 [id number]                     Emergency Contacts        (Rel.) Home Phone Work Phone Mobile Phone    Ang Nelson (Son) [phone] -- [phone]    KRISHOTISLISETH (Sister) -- -- [phone]                 History & Physical        Jhon Burnett MD at 24 1651            [facility] HOSPITALIST   HISTORY AND PHYSICAL      Name:  Tracey Nelson   Age:  83 y.o.  Sex:  female  :  1940  MRN:  [id number]   Visit Number:  [id number]  Admission Date:  2024  Date Of Service:  " 02/19/24  Primary Care Physician:  Leola Wright MD    Chief Complaint:     Fall and left hip pain.    History Of Presenting Illness:      Tracey Nelson is an 83-year-old female with history of coronary artery disease, left bundle branch block, hypertension, peripheral artery disease was brought to the emergency room by her friend after she sustained a fall and developed left hip pain.  Patient was with her friend who brought her to the emergency room.  Patient apparently was going to get an echocardiogram done at [facility] and her friend was driving but this happened while she was getting into the car.  Patient apparently hit her left forehead as well.  Patient states that she does have peripheral arterial disease and takes aspirin and has had stents in her legs.  She denies any prior history of coronary artery disease or coronary stents.  She is currently complaining of mild headache and left hip pain.    In the emergency room, she was afebrile and hemodynamically stable except for elevated blood pressure of 164/58.  Initial pulse oxygen saturation was 94% on room air but she subsequently dropped to 87% and had to be placed on 2 L of nasal cannula oxygen.  Blood work including CMP and CBC was unremarkable except for a sodium of 132 and a hemoglobin of 9.2 (baseline 11).  INR 1.19.  CT of the head and CT of the cervical spine were unremarkable.  Chest x-ray showed abnormal retrocardiac density left lung base which she has been following up with Dr. Ramirez.  X-ray of the hip with pelvis showed left subcapital femoral neck fracture.  Patient's condition was discussed with Dr. Gomez and the patient was subsequently admitted to the medical floor for treatment of left hip fracture.    Review Of Systems:    All systems were reviewed and negative except as mentioned in history of presenting illness, assessment and plan.    Past Medical History: Patient  has a past medical history of Allergies, Bronchitis,  Cataract, bilateral, Coronary artery disease, Hyperlipidemia, LBBB (left bundle branch block) (2018), Myocardial infarction, PAD (peripheral artery disease), Pneumonia, Skin cancer, and Wears dentures.    Past Surgical History: Patient  has a past surgical history that includes Tonsillectomy; Carotid Endarterectomy (Left); Other surgical history (Bilateral); Cataract extraction w/ intraocular lens  implant, bilateral; Skin cancer excision; Colonoscopy; Skin biopsy; and Dilation and curettage, diagnostic / therapeutic.    Social History: Patient  reports that she has been smoking cigarettes. She has a 20.00 pack-year smoking history. She has never used smokeless tobacco. Drug use questions deferred to the physician. She reports that she does not drink alcohol.    Family History:  Patient denies any significant family medical history.    Allergies:      Patient has no known allergies.    Home Medications:    Prior to Admission Medications       Prescriptions Last Dose Informant Patient Reported? Taking?    amLODIPine (NORVASC) 2.5 MG tablet  Self Yes No    every night at bedtime.    Aspirin 81 MG capsule  Self Yes No    aspirin 80 mg tablet   Take 1 tablet every day by oral route.    azithromycin (ZITHROMAX) 250 MG tablet   No No    Take 2 tablets the first day then 1 tablet a day for 4 days thereafter.    Patient not taking:  Reported on 8/30/2023    benzonatate (TESSALON) 200 MG capsule   Yes No    Take 200 mg by mouth 3 (Three) Times a Day As Needed.    Patient not taking:  Reported on 8/30/2023    busPIRone (BUSPAR) 10 MG tablet  Self Yes No    buspirone 10 mg tablet   Two times a day as needed    Cholecalciferol (Vitamin D3) 125 MCG (5000 UT) tablet dispersible  Self Yes No    Vitamin D3 2,000 unit tablet   Take 1 tablet every day by oral route for 30 days.    Patient not taking:  Reported on 8/31/2023    Cholecalciferol 50 MCG (2000 UT) tablet  Self Yes No    Vitamin D3 50 mcg (2,000 unit) tablet   Take 1 tablet  every day by oral route for 30 days.    cilostazol (PLETAL) 50 MG tablet   Yes No    cilostazol 50 mg tablet   To take 1 tablet BID    cilostazol (PLETAL) 50 MG tablet  Self Yes No    Take 1 tablet by mouth 2 (Two) Times a Day.    cyanocobalamin (VITAMIN B-12) 250 MCG tablet  Self Yes No    Vitamin B-12 250 mcg tablet   Take 1 Daily    doxazosin (CARDURA) 2 MG tablet  Self Yes No    Take 1 tablet(s) every day by oral route for 90 days.    Patient not taking:  Reported on 8/31/2023    Fluticasone-Umeclidin-Vilant (Trelegy Ellipta) 200-62.5-25 MCG/ACT aerosol powder   Self Yes No    Inhale Daily With Lunch.    furosemide (LASIX) 20 MG tablet   No No    Take 1 tablet once daily as needed for edema    ipratropium-albuterol (DUO-NEB) 0.5-2.5 mg/3 ml nebulizer  Self No No    Take 3 mL by nebulization 4 (Four) Times a Day As Needed for Wheezing or Shortness of Air.    lisinopril (PRINIVIL,ZESTRIL) 30 MG tablet   Yes No    Patient not taking:  Reported on 8/30/2023    losartan (COZAAR) 50 MG tablet  Self Yes No    Take 1 tablet by mouth Daily.    metoprolol tartrate (LOPRESSOR) 50 MG tablet   Yes No    Take 1 tablet by mouth 2 (Two) Times a Day.    mirtazapine (REMERON) 15 MG tablet   Yes No    Patient not taking:  Reported on 8/30/2023    Nebulizer device   No No    1 Device Take As Directed.    Patient not taking:  Reported on 8/30/2023    simvastatin (ZOCOR) 40 MG tablet   Yes No    simvastatin (ZOCOR) 40 MG tablet  Self Yes No    Take 1 tablet by mouth every night at bedtime.    vitamin B-12 (CYANOCOBALAMIN) 100 MCG tablet  Self Yes No    Vitamin B-12 250 mcg tablet   Take 1 Daily          ED Medications:    Medications   sodium chloride 0.9 % flush 10 mL (has no administration in time range)   methocarbamol (ROBAXIN) tablet 750 mg (750 mg Oral Given 2/19/24 1407)     Vital Signs:  Temp:  [98.5 °F (36.9 °C)] 98.5 °F (36.9 °C)  Heart Rate:  [85-97] 88  Resp:  [16] 16  BP: (164)/(58) 164/58        02/19/24  1337   Weight:  "42.6 kg (94 lb)     Body mass index is 15.64 kg/m².    Physical Exam:     Most recent vital Signs: /58 (BP Location: Left arm, Patient Position: Sitting)   Pulse 88   Temp 98.5 °F (36.9 °C) (Oral)   Resp 16   Ht 165.1 cm (65\")   Wt 42.6 kg (94 lb)   SpO2 (!) 89%   BMI 15.64 kg/m²     Physical Exam  Constitutional:       General: She is not in acute distress.     Appearance: Normal appearance. She is not ill-appearing.   HENT:      Head: Normocephalic.      Comments: Small skin abrasion noted on the left forehead next to the left eye     Right Ear: External ear normal.      Left Ear: External ear normal.      Nose: Nose normal.      Mouth/Throat:      Mouth: Mucous membranes are moist.   Eyes:      Extraocular Movements: Extraocular movements intact.      Conjunctiva/sclera: Conjunctivae normal.   Cardiovascular:      Rate and Rhythm: Normal rate and regular rhythm.      Pulses: Normal pulses.      Heart sounds: Normal heart sounds. No murmur heard.  Pulmonary:      Effort: Pulmonary effort is normal.      Breath sounds: Normal breath sounds. No wheezing or rales.   Abdominal:      General: Bowel sounds are normal.      Palpations: Abdomen is soft.      Tenderness: There is no abdominal tenderness. There is no guarding or rebound.   Musculoskeletal:      Cervical back: Neck supple.      Right lower leg: Edema present.      Left lower leg: Edema present.      Comments: Tenderness noted in the left hip area. Bilateral 1+ pitting edema noted in the ankles. left leg is externally rotated.   Skin:     General: Skin is dry.      Findings: Bruising present. No erythema or rash.   Neurological:      General: No focal deficit present.      Mental Status: She is alert and oriented to person, place, and time. Mental status is at baseline.   Psychiatric:         Mood and Affect: Mood normal.         Behavior: Behavior normal.       Laboratory data:    I have reviewed the labs done in the emergency room.    Results " from last 7 days   Lab Units 02/19/24  1546   SODIUM mmol/L 132*   POTASSIUM mmol/L 3.7   CHLORIDE mmol/L 96*   CO2 mmol/L 23.3   BUN mg/dL 9   CREATININE mg/dL 0.42*   CALCIUM mg/dL 9.2   GLUCOSE mg/dL 96     Results from last 7 days   Lab Units 02/19/24  1546   WBC 10*3/mm3 8.88   HEMOGLOBIN g/dL 9.2*   HEMATOCRIT % 29.9*   PLATELETS 10*3/mm3 336     Results from last 7 days   Lab Units 02/19/24  1546   INR  1.19*     EKG:      EKG done in the emergency room was reviewed by me.  It shows sinus rhythm at 87 bpm.  Normal axis.  Q waves in noted in V1 to V3.  Left bundle branch block noted with nonspecific ST-T changes.    Radiology:    XR Chest 1 View    Result Date: 2/19/2024  PROCEDURE: XR CHEST 1 VW-  HISTORY: Preoperative for respiratory clearance.  COMPARISON: 10/16/2022 and 12/24/2023.  FINDINGS:  Portable view of the chest demonstrates chronic changes right infrahilar region. There is underlying emphysema. Retrocardiac density left lung base is noted new from prior exam which may represent pneumonia although mass is not excluded. Small left pleural effusion is suspected. The mediastinum is unremarkable.  The heart size is normal.      Abnormal retrocardiac density left lung base. Given chronicity consider contrast-enhanced chest CT to further characterize.    This report was signed and finalized on 2/19/2024 3:27 PM by Ahsan Hui MD.      XR Hip With or Without Pelvis 2 - 3 View Left    Result Date: 2/19/2024  PROCEDURE: XR HIP W OR WO PELVIS 2-3 VIEW LEFT-  HISTORY: trauma, pain  FINDINGS:  Two views show fracture of the subcapital left femoral neck. There is mild impaction and displacement.  The joint spaces appear normal.       Subcapital left femoral neck fracture as above.    This report was signed and finalized on 2/19/2024 2:37 PM by Ahsan Hui MD.      CT Cervical Spine Without Contrast    Result Date: 2/19/2024  PROCEDURE: CT CERVICAL SPINE WO CONTRAST-  HISTORY: Neck trauma (Age >= 65y), neck  pain  TECHNIQUE: Thin section axial CT with sagittal and coronal reconstructions  FINDINGS: No fracture is present. Retrolisthesis C3-4 measures 3.5 mm. Remaining alignment is normal. Moderate diffuse degenerative disc disease is noted. Canal stenosis is seen C4-5. There is multilevel bony neural foraminal narrowing..      No fracture   This study was performed with techniques to keep radiation doses as low as reasonably achievable (ALARA). Individualized dose reduction techniques using automated exposure control or adjustment of vA and/or kV according to the patient size were employed.  This report was signed and finalized on 2/19/2024 2:28 PM by Ahsan Hui MD.      CT Head Without Contrast    Result Date: 2/19/2024  PROCEDURE: CT HEAD WO CONTRAST-  HISTORY: Head trauma, minor (Age >= 65y)  TECHNIQUE: Noncontrast exam  FINDINGS: Moderate atrophy and chronic ischemic white matter changes are noted.  No cortical edema is present. There is no mass or hemorrhage. Ventricles are normal.  Bone windows show no skull fracture or obvious destructive lesion.      1. No acute intracranial abnormality or obvious mass. 2. Atrophy and chronic ischemic white matter changes as above.   This study was performed with techniques to keep radiation doses as low as reasonably achievable (ALARA). Individualized dose reduction techniques using automated exposure control or adjustment of vA and/or kV according to the patient size were employed.    This report was signed and finalized on 2/19/2024 2:27 PM by Ahsan Hui MD.       Assessment:    Status post mechanical fall and left closed hip fracture, POA.  Peripheral artery disease with previous peripheral stents.  Coronary artery disease on medical therapy.  Essential hypertension.  COPD, no exacerbation.    Plan:    Left hip fracture.  - Continue morphine, Lortab and Zofran for symptomatic control.  - We will consult orthopedic services.  - Keep n.p.o. after midnight.  - Due to her  history of peripheral artery disease and CAD, we will consult cardiology for preoperative clearance.  - 2D echocardiogram.    PAD/CAD  - Continue home medications including aspirin, Pletal, simvastatin.    COPD.  - No evidence of exacerbation.  - Continue bronchodilators as needed.    I have discussed the patient's condition and treatment plan with her son and other family members were at the bedside.    Risk Assessment: Moderate  DVT Prophylaxis: Lovenox  Code Status: Full  Diet: Cardiac, n.p.o. after midnight.      Jhon Burnett MD  02/19/24  16:51 EST    Dictated utilizing Dragon dictation.    Electronically signed by Jhon Burnett MD at 02/19/24 1715          Emergency Department Notes        Sumanth Trejo MD at 02/19/24 1358        Procedure Orders    1. ECG 12 Lead QT Measurement [[id number]] ordered by Sumanth Trejo MD                 Subjective   History of Present Illness  This 83-year-old female with past medical history of coronary artery disease presented to the emergency department after an accidental fall.  The patient states that she was walking around her car when she tripped on the concrete.  She fell forward and landed on her left side.  She did hit her left forehead as well as her left hip.  She is complaining some mild headache as well as left hip pain.  She was able to ambulate afterwards, however cause pain.  She is having some left hip pain when she changes positions.  There is no loss of consciousness.  Patient denies any change in vision or focal weakness.  No neck pain.  No chest pain or shortness of breath.  No abdominal pain or vomiting.    History provided by:  Patient and relative   used: No        Review of Systems   Constitutional:  Negative for chills and fever.   HENT:  Negative for congestion, ear pain and sore throat.    Eyes:  Negative for visual disturbance.   Respiratory:  Negative for shortness of breath.    Cardiovascular:  Negative for chest  pain.   Gastrointestinal:  Negative for abdominal pain.   Genitourinary:  Negative for difficulty urinating.   Musculoskeletal:  Positive for arthralgias.   Skin:  Negative for rash.   Neurological:  Positive for headaches. Negative for dizziness, weakness and numbness.   Psychiatric/Behavioral:  Negative for agitation.        Past Medical History:   Diagnosis Date    Allergies     Bronchitis     Cataract, bilateral     s/p surgery    Coronary artery disease     Hyperlipidemia     LBBB (left bundle branch block) 2018    Myocardial infarction     pt denies    PAD (peripheral artery disease)     stent in each leg    Pneumonia     Skin cancer     Wears dentures     upper plate and lower is a partial       No Known Allergies    Past Surgical History:   Procedure Laterality Date    CAROTID ENDARTERECTOMY Left     CATARACT EXTRACTION W/ INTRAOCULAR LENS  IMPLANT, BILATERAL      COLONOSCOPY      DILATION AND CURETTAGE, DIAGNOSTIC / THERAPEUTIC      OTHER SURGICAL HISTORY Bilateral     stent in each leg    SKIN BIOPSY      SKIN CANCER EXCISION      TONSILLECTOMY         History reviewed. No pertinent family history.    Social History     Socioeconomic History    Marital status:    Tobacco Use    Smoking status: Some Days     Packs/day: 0.50     Years: 40.00     Additional pack years: 0.00     Total pack years: 20.00     Types: Cigarettes    Smokeless tobacco: Never   Vaping Use    Vaping Use: Never used   Substance and Sexual Activity    Alcohol use: Never    Drug use: Defer    Sexual activity: Defer           Objective   Physical Exam  Vitals and nursing note reviewed.   Constitutional:       General: She is not in acute distress.     Appearance: She is not ill-appearing or toxic-appearing.   HENT:      Head:      Comments: Small hematoma along the left frontal region.  No evidence of basilar skull fracture     Mouth/Throat:      Pharynx: No posterior oropharyngeal erythema.   Eyes:      Conjunctiva/sclera:  Conjunctivae normal.      Pupils: Pupils are equal, round, and reactive to light.   Cardiovascular:      Rate and Rhythm: Normal rate and regular rhythm.   Pulmonary:      Effort: Pulmonary effort is normal. No respiratory distress.   Abdominal:      General: Abdomen is flat. There is no distension.      Palpations: There is no mass.      Tenderness: There is no abdominal tenderness. There is no guarding or rebound.   Musculoskeletal:         General: No deformity. Normal range of motion.      Comments: Mild tenderness to palpation along the left hip.  There is no evidence of instability of the pelvis.  Compartment soft.  Neurovascular intact   Skin:     General: Skin is warm.      Findings: No rash.   Neurological:      General: No focal deficit present.      Mental Status: She is alert and oriented to person, place, and time.      Motor: No weakness.         ECG 12 Lead QT Measurement      Date/Time: 2/19/2024 5:01 PM    Performed by: Sumanth Trejo MD  Authorized by: Sumanth Trejo MD  Interpreted by ED physician  Comparison: compared with previous ECG   Similar to previous ECG  Rhythm: sinus rhythm  Ectopy: PVCs  Rate: normal  BPM: 87  QRS axis: normal  Conduction: conduction normal  Other findings comments: Nonspecific ST changes  Clinical impression: non-specific ECG  Comments: EKG was directly visualized by myself, interpretations as documented in hospital course.              ED Course  ED Course as of 02/19/24 1704 Mon Feb 19, 2024   1400 BP: 164/58 [JK]   1400 Temp: 98.5 °F (36.9 °C) [JK]   1400 Temp src: Oral [JK]   1400 Heart Rate: 97 [JK]   1400 Resp: 16 [JK]   1400 SpO2: 94 %  Patient's repeat vitals, telemetry tracing, and pulse oximetry tracing were directly viewed and interpreted by myself.  Normal sinus rhythm [JK]   1701 Protime-INR(!) [JK]   1701 aPTT(!) [JK]   1701 CBC & Differential(!) [JK]   1701 Basic Metabolic Panel(!) [JK]   1702 Laboratory studies were reviewed and  interpreted directly by myself.  INR was slightly elevated at 1.19, BMP unremarkable, CBC shows a chronic anemia with a hemoglobin of 9.2 [JK]   1702 XR Chest 1 View [JK]   1702 XR Hip With or Without Pelvis 2 - 3 View Left [JK]   1702 CT Cervical Spine Without Contrast [JK]   1702 CT Head Without Contrast  Imaging was directly visualized by myself, per my interpretations, chest x-ray showed a chronic retrohilar infiltrate, x-ray of the left hip showed a femoral neck fracture, CT C-spine was unremarkable, CT of the head showed some chronic changes. [JK]   1702 Patient has findings consistent with an acute fracture of the left hip.  I did consult on-call orthopedic surgery, Dr. Gomez.  He was notified about the patient.  Patient be admitted for further evaluation and treatment. [JK]   1703 Based on the patient's presentation, history and diffuse work-up in the emergency department, the patient is deemed appropriate for admission to the hospital for further evaluation and treatment.  This was discussed with the patient at bedside.  They are in agreement with the current medical management.    Admitting physician: Dr. Burnett    Discussion was had with admitting physician regarding the laboratory and imaging findings.  We did discuss current therapeutics in the emergency department and progression of the patient.  Working diagnosis was conveyed to the admitting physician, as well as current status and prognosis for the patient.  They are in agreement with these findings and have accepted admission.    Shared decision making:   After full review of the patient's clinical presentation, review of any work-up including but not limited to laboratory studies and radiology obtained, I had a discussion with the patient.  Treatment options were discussed as well as the risks, benefits and consequences.  I discussed all findings with the patient and family members if available.  During the discussion, treatment goals were  understood by all as well as any misconceptions which were addressed with the patient.  Ample time was given for any questions they may have had.  They are in agreement with the treatment plan as well as final disposition. [JK]      ED Course User Index  [JK] Sumanth Trejo MD                                             Medical Decision Making  This is a 83-year-old female presented to the emergency department after an accidental fall.  The patient tripped while walking on concrete.  She small hematoma to the left left forehead as well as some left hip pain.  Patient meets imaging criteria for the head and cervical spine based on Nexus guidelines.  Most concern for possible hip fracture contusion.  Patient given analgesics.  Imaging obtained.      Differential diagnosis: Closed head injury, intracranial abnormality, skull fracture, left hip sprain, strain, contusion, fracture      Amount and/or Complexity of Data Reviewed  External Data Reviewed: labs, radiology and notes.     Details: External laboratories, imaging as well as notes were reviewed personally by myself.  All relevant studies were used to guide decision making.     Date of previous record: 11/17/2023    Source of note: Primary care physician    Summary: Patient seen evaluate for routine visit.  Did review basic laboratory studies on file as well as previous chest x-ray.  Records reviewed.    Labs: ordered. Decision-making details documented in ED Course.  Radiology: ordered and independent interpretation performed. Decision-making details documented in ED Course.  ECG/medicine tests: ordered and independent interpretation performed. Decision-making details documented in ED Course.    Risk  Prescription drug management.        Final diagnoses:   Closed fracture of left hip, initial encounter       ED Disposition  ED Disposition       ED Disposition   Decision to Admit    Condition   --    Comment   Level of Care: Telemetry [5]   Diagnosis: Closed  left hip fracture [497450]   Admitting Physician: JHON CAMPBELL [[id number]]   Attending Physician: JHON CAMPBELL [[id number]]   Certification: I Certify That Inpatient Hospital Services Are Medically Necessary For Greater Than 2 Midnights                 No follow-up provider specified.       Medication List      No changes were made to your prescriptions during this visit.            Sumanth Trejo MD  02/19/24 1704      Electronically signed by Sumanth Trejo MD at 02/19/24 1704       Vital Signs (last day)       Date/Time Temp Temp src Pulse Resp BP Patient Position SpO2    02/20/24 0944 -- -- -- -- 153/54 -- --    02/20/24 0701 99 (37.2) Oral 67 16 153/54 Lying 94    02/20/24 0316 98.4 (36.9) Oral 85 16 147/56 Lying 95    02/19/24 2328 98.6 (37) Oral 80 18 144/56 Lying 94    02/19/24 1918 98.5 (36.9) Oral 89 18 155/57 Lying 96    02/19/24 1800 98 (36.7) Oral -- -- -- -- --    02/19/24 1734 -- -- 96 18 165/75 Sitting 95    02/19/24 1715 -- -- 93 -- -- -- 93    02/19/24 1714 -- -- 93 -- -- -- --    02/19/24 1700 -- -- 95 -- 178/69 -- 94    02/19/24 1636 -- -- 87 -- 163/66 -- 90    02/19/24 1632 -- -- 88 -- -- -- 89    02/19/24 1627 -- -- 90 -- -- -- 87    02/19/24 1622 -- -- 85 -- -- -- 87    02/19/24 1617 -- -- 85 -- -- -- 89    02/19/24 1612 -- -- 88 -- -- -- 89    02/19/24 1337 98.5 (36.9) Oral 97 16 164/58 Sitting 94          Current Facility-Administered Medications   Medication Dose Route Frequency Provider Last Rate Last Admin    acetaminophen (TYLENOL) tablet 650 mg  650 mg Oral Q4H PRN Jhon Campbell MD        Or    acetaminophen (TYLENOL) 160 MG/5ML oral solution 650 mg  650 mg Oral Q4H PRN Jhon Campbell MD        Or    acetaminophen (TYLENOL) suppository 650 mg  650 mg Rectal Q4H PRN Jhon Campbell MD        aspirin EC tablet 81 mg  81 mg Oral Daily Jhon Campbell MD        atorvastatin (LIPITOR) tablet 20 mg  20 mg Oral Nightly Jhon Campbell MD   20 mg at 02/19/24 2250    sennosides-docusate (PERICOLACE)  8.6-50 MG per tablet 2 tablet  2 tablet Oral BID Jhon Burnett MD        And    polyethylene glycol (MIRALAX) packet 17 g  17 g Oral Daily PRN Jhon Burnett MD        And    bisacodyl (DULCOLAX) EC tablet 5 mg  5 mg Oral Daily PRN Jhon Burnett MD        And    bisacodyl (DULCOLAX) suppository 10 mg  10 mg Rectal Daily PRN Jhon Burnett MD        budesonide (PULMICORT) nebulizer solution 0.5 mg  0.5 mg Nebulization BID - RT Jhon Burnett MD   0.5 mg at 02/20/24 0701    cilostazol (PLETAL) tablet 50 mg  50 mg Oral BID Jhon Burnett MD   50 mg at 02/19/24 2250    Enoxaparin Sodium (LOVENOX) syringe 40 mg  40 mg Subcutaneous Nightly Jhon Burnett MD        ipratropium-albuterol (DUO-NEB) nebulizer solution 3 mL  3 mL Nebulization 4x Daily PRN Jhon Burnett MD        losartan (COZAAR) tablet 50 mg  50 mg Oral Daily Jhon Burnett MD        metoprolol tartrate (LOPRESSOR) tablet 50 mg  50 mg Oral BID Jhon Burnett MD        Morphine sulfate (PF) injection 2 mg  2 mg Intravenous Q4H PRN Jhon Burnett MD   2 mg at 02/19/24 2258    And    naloxone (NARCAN) injection 0.4 mg  0.4 mg Intravenous Q5 Min PRN Jhon Burnett MD        ondansetron (ZOFRAN) injection 4 mg  4 mg Intravenous Q6H PRN Jhon Burnett MD        Pharmacy to Dose enoxaparin (LOVENOX)   Does not apply Continuous PRN Jhon Burnett MD        sodium chloride 0.9 % flush 10 mL  10 mL Intravenous PRN Sumanth Trejo MD        sodium chloride 0.9 % flush 10 mL  10 mL Intravenous Q12H Jhon Burnett MD   10 mL at 02/20/24 0839    sodium chloride 0.9 % flush 10 mL  10 mL Intravenous PRN Jhon Burnett MD        sodium chloride 0.9 % infusion 40 mL  40 mL Intravenous PRN Jhon Burnett MD         Lab Results (last 24 hours)       Procedure Component Value Units Date/Time    High Sensitivity Troponin T 2Hr [[id number]]  (Abnormal) Collected: 02/20/24 0707    Specimen: Blood Updated: 02/20/24 0814     HS Troponin T 18 ng/L      Troponin T Delta -1 ng/L     Narrative:       High Sensitive Troponin T Reference Range:  <14.0 ng/L- Negative Female for AMI  <22.0 ng/L- Negative Male for AMI  >=14 - Abnormal Female indicating possible myocardial injury.  >=22 - Abnormal Male indicating possible myocardial injury.   Clinicians would have to utilize clinical acumen, EKG, Troponin, and serial changes to determine if it is an Acute Myocardial Infarction or myocardial injury due to an underlying chronic condition.         Basic Metabolic Panel [[id number]]  (Abnormal) Collected: 02/20/24 0523    Specimen: Blood Updated: 02/20/24 0617     Glucose 115 mg/dL      BUN 9 mg/dL      Creatinine 0.50 mg/dL      Sodium 133 mmol/L      Potassium 3.8 mmol/L      Chloride 98 mmol/L      CO2 24.0 mmol/L      Calcium 9.5 mg/dL      BUN/Creatinine Ratio 18.0     Anion Gap 11.0 mmol/L      eGFR 92.6 mL/min/1.73     Narrative:      GFR Normal >60  Chronic Kidney Disease <60  Kidney Failure <15    The GFR formula is only valid for adults with stable renal function between ages 18 and 70.    High Sensitivity Troponin T [[id number]]  (Abnormal) Collected: 02/20/24 0523    Specimen: Blood Updated: 02/20/24 0617     HS Troponin T 19 ng/L     Narrative:      High Sensitive Troponin T Reference Range:  <14.0 ng/L- Negative Female for AMI  <22.0 ng/L- Negative Male for AMI  >=14 - Abnormal Female indicating possible myocardial injury.  >=22 - Abnormal Male indicating possible myocardial injury.   Clinicians would have to utilize clinical acumen, EKG, Troponin, and serial changes to determine if it is an Acute Myocardial Infarction or myocardial injury due to an underlying chronic condition.         CBC (No Diff) [[id number]]  (Abnormal) Collected: 02/20/24 0523    Specimen: Blood Updated: 02/20/24 0550     WBC 8.67 10*3/mm3      RBC 3.92 10*6/mm3      Hemoglobin 9.8 g/dL      Hematocrit 32.9 %      MCV 83.9 fL      MCH 25.0 pg      MCHC 29.8 g/dL      RDW 16.6 %      RDW-SD 51.0 fl      MPV 8.7 fL      Platelets 351  10*3/mm3     Basic Metabolic Panel [[id number]]  (Abnormal) Collected: 02/19/24 1546    Specimen: Blood Updated: 02/19/24 1620     Glucose 96 mg/dL      BUN 9 mg/dL      Creatinine 0.42 mg/dL      Sodium 132 mmol/L      Potassium 3.7 mmol/L      Comment: Slight hemolysis detected by analyzer. Result may be falsely elevated.        Chloride 96 mmol/L      CO2 23.3 mmol/L      Calcium 9.2 mg/dL      BUN/Creatinine Ratio 21.4     Anion Gap 12.7 mmol/L      eGFR 96.6 mL/min/1.73     Narrative:      GFR Normal >60  Chronic Kidney Disease <60  Kidney Failure <15    The GFR formula is only valid for adults with stable renal function between ages 18 and 70.    Protime-INR [[id number]]  (Abnormal) Collected: 02/19/24 1546    Specimen: Blood Updated: 02/19/24 1612     Protime 15.6 Seconds      INR 1.19    Narrative:      Suggested INR therapeutic range for stable oral anticoagulant therapy:    Low Intensity therapy:   1.5-2.0  Moderate Intensity therapy:   2.0-3.0  High Intensity therapy:   2.5-4.0    aPTT [[id number]]  (Abnormal) Collected: 02/19/24 1546    Specimen: Blood Updated: 02/19/24 1612     PTT 28.7 seconds     CBC & Differential [[id number]]  (Abnormal) Collected: 02/19/24 1546    Specimen: Blood Updated: 02/19/24 1557    Narrative:      The following orders were created for panel order CBC & Differential.  Procedure                               Abnormality         Status                     ---------                               -----------         ------                     CBC Auto Differential[[id number]]        Abnormal            Final result                 Please view results for these tests on the individual orders.    CBC Auto Differential [[id number]]  (Abnormal) Collected: 02/19/24 1546    Specimen: Blood Updated: 02/19/24 1557     WBC 8.88 10*3/mm3      RBC 3.59 10*6/mm3      Hemoglobin 9.2 g/dL      Hematocrit 29.9 %      MCV 83.3 fL      MCH 25.6 pg      MCHC 30.8 g/dL      RDW 16.5 %      RDW-SD 50.5 fl       MPV 9.1 fL      Platelets 336 10*3/mm3      Neutrophil % 83.5 %      Lymphocyte % 8.1 %      Monocyte % 7.7 %      Eosinophil % 0.2 %      Basophil % 0.2 %      Immature Grans % 0.3 %      Neutrophils, Absolute 7.41 10*3/mm3      Lymphocytes, Absolute 0.72 10*3/mm3      Monocytes, Absolute 0.68 10*3/mm3      Eosinophils, Absolute 0.02 10*3/mm3      Basophils, Absolute 0.02 10*3/mm3      Immature Grans, Absolute 0.03 10*3/mm3      nRBC 0.0 /100 WBC           Imaging Results (Last 24 Hours)       Procedure Component Value Units Date/Time    XR Chest 1 View [[id number]] Collected: 02/19/24 1521     Updated: 02/19/24 1529    Narrative:      PROCEDURE: XR CHEST 1 VW-     HISTORY: Preoperative for respiratory clearance.     COMPARISON: 10/16/2022 and 12/24/2023.     FINDINGS:  Portable view of the chest demonstrates chronic changes right  infrahilar region. There is underlying emphysema. Retrocardiac density  left lung base is noted new from prior exam which may represent  pneumonia although mass is not excluded. Small left pleural effusion is  suspected. The mediastinum is unremarkable.     The heart size is normal.       Impression:      Abnormal retrocardiac density left lung base. Given  chronicity consider contrast-enhanced chest CT to further characterize.           This report was signed and finalized on 2/19/2024 3:27 PM by Ahsan Hui MD.       XR Hip With or Without Pelvis 2 - 3 View Left [[id number]] Collected: 02/19/24 1437     Updated: 02/19/24 1439    Narrative:      PROCEDURE: XR HIP W OR WO PELVIS 2-3 VIEW LEFT-     HISTORY: trauma, pain     FINDINGS:  Two views show fracture of the subcapital left femoral neck.   There is mild impaction and displacement.     The joint spaces appear normal.          Impression:      Subcapital left femoral neck fracture as above.           This report was signed and finalized on 2/19/2024 2:37 PM by Ahsan Hui MD.       CT Cervical Spine Without Contrast  [[id number]] Collected: 02/19/24 1427     Updated: 02/19/24 1430    Narrative:      PROCEDURE: CT CERVICAL SPINE WO CONTRAST-     HISTORY: Neck trauma (Age >= 65y), neck pain     TECHNIQUE: Thin section axial CT with sagittal and coronal  reconstructions     FINDINGS: No fracture is present. Retrolisthesis C3-4 measures 3.5 mm.  Remaining alignment is normal. Moderate diffuse degenerative disc  disease is noted. Canal stenosis is seen C4-5. There is multilevel bony  neural foraminal narrowing..       Impression:      No fracture        This study was performed with techniques to keep radiation doses as low  as reasonably achievable (ALARA). Individualized dose reduction  techniques using automated exposure control or adjustment of vA and/or  kV according to the patient size were employed.      This report was signed and finalized on 2/19/2024 2:28 PM by Ahsan Hui MD.       CT Head Without Contrast [[id number]] Collected: 02/19/24 1426     Updated: 02/19/24 1429    Narrative:      PROCEDURE: CT HEAD WO CONTRAST-     HISTORY: Head trauma, minor (Age >= 65y)     TECHNIQUE: Noncontrast exam     FINDINGS: Moderate atrophy and chronic ischemic white matter changes are  noted.     No cortical edema is present. There is no mass or hemorrhage. Ventricles  are normal.     Bone windows show no skull fracture or obvious destructive lesion.       Impression:      1. No acute intracranial abnormality or obvious mass.   2. Atrophy and chronic ischemic white matter changes as above.        This study was performed with techniques to keep radiation doses as low  as reasonably achievable (ALARA). Individualized dose reduction  techniques using automated exposure control or adjustment of vA and/or  kV according to the patient size were employed.            This report was signed and finalized on 2/19/2024 2:27 PM by Ahsan Hui MD.             Physician Progress Notes (last 24 hours)  Notes from 02/19/24 1046 through 02/20/24  1046   No notes of this type exist for this encounter.          Consult Notes (last 24 hours)        Antony Burton MD at 24 1034        Consult Orders    1. Inpatient Cardiology Consult [[id number]] ordered by Kerley, Brian Joseph, DO at 24 0703                      [facility] Consult Note    Tracey Nelson  1940  [id number]  24    Requesting Physician: No ref. provider found    Chief Complaint: Left hip pain    History of Present Illness:   Mrs. Tracey Nelson is a 83 y.o. female who is being seen by Cardiology for preoperative cardiac risk assessment.  The patient has a past medical history significant for chronic left frontal branch block, hyperlipidemia, chronic tobacco use, COPD, peripheral arterial disease with prior lower extremity and carotid interventions.  She denies any significant past cardiac history.  She presented to the [facility] for evaluation of left hip pain after sustaining a fall.  The patient reports she was getting into the car in order to go get an echocardiogram in [place].  She subsequently fell onto her left side.  She reports developing instant left hip discomfort following the fall.  She denies any dizziness or lightheadedness associated with the fall.  No history of presyncopal or syncopal events.  Prior to the fall, the patient reports she was active during the day forming usual daily activities including sweeping and vacuuming.  She denies any exertional chest discomfort.  She does have chronic, mild exertional dyspnea in the setting of underlying COPD with ongoing tobacco use.  No other specific complaints today.    Prior Cardiac Testin. Last Coronary Angio: None  2. Prior Stress Testing: None  3. Last Echo: None  4. Prior Holter Monitor: None    Review of Systems:   Review of Systems   Constitutional:  Negative for activity change, appetite change, chills, diaphoresis, fatigue, fever, unexpected weight  gain and unexpected weight loss.   Eyes:  Negative for blurred vision and double vision.   Respiratory:  Positive for shortness of breath. Negative for cough, chest tightness and wheezing.    Cardiovascular:  Negative for chest pain, palpitations and leg swelling.   Gastrointestinal:  Negative for abdominal pain, anal bleeding, blood in stool and GERD.   Endocrine: Negative for cold intolerance and heat intolerance.   Genitourinary:  Negative for hematuria.   Musculoskeletal:         Left hip pain   Neurological:  Negative for dizziness, syncope, weakness and light-headedness.   Hematological:  Does not bruise/bleed easily.   Psychiatric/Behavioral:  Negative for depressed mood and stress. The patient is not nervous/anxious.        Past Medical History:   Past Medical History:   Diagnosis Date    Allergies     Bronchitis     Cataract, bilateral     s/p surgery    Coronary artery disease     Hyperlipidemia     LBBB (left bundle branch block) 2018    Myocardial infarction     pt denies    PAD (peripheral artery disease)     stent in each leg    Pneumonia     Skin cancer     Wears dentures     upper plate and lower is a partial       Past Surgical History:   Past Surgical History:   Procedure Laterality Date    CAROTID ENDARTERECTOMY Left     CATARACT EXTRACTION W/ INTRAOCULAR LENS  IMPLANT, BILATERAL      COLONOSCOPY      DILATION AND CURETTAGE, DIAGNOSTIC / THERAPEUTIC      OTHER SURGICAL HISTORY Bilateral     stent in each leg    SKIN BIOPSY      SKIN CANCER EXCISION      TONSILLECTOMY         Family History: History reviewed. No pertinent family history.    Social History:   Social History     Socioeconomic History    Marital status:    Tobacco Use    Smoking status: Some Days     Packs/day: 0.50     Years: 40.00     Additional pack years: 0.00     Total pack years: 20.00     Types: Cigarettes    Smokeless tobacco: Never   Vaping Use    Vaping Use: Never used   Substance and Sexual Activity    Alcohol use:  Never    Drug use: Defer    Sexual activity: Defer       Medications:     Current Facility-Administered Medications:     acetaminophen (TYLENOL) tablet 650 mg, 650 mg, Oral, Q4H PRN **OR** acetaminophen (TYLENOL) 160 MG/5ML oral solution 650 mg, 650 mg, Oral, Q4H PRN **OR** acetaminophen (TYLENOL) suppository 650 mg, 650 mg, Rectal, Q4H PRN, Jhon Burnett MD    aspirin EC tablet 81 mg, 81 mg, Oral, Daily, Jhon Burnett MD    atorvastatin (LIPITOR) tablet 20 mg, 20 mg, Oral, Nightly, Jhon Burnett MD, 20 mg at 02/19/24 2250    sennosides-docusate (PERICOLACE) 8.6-50 MG per tablet 2 tablet, 2 tablet, Oral, BID **AND** polyethylene glycol (MIRALAX) packet 17 g, 17 g, Oral, Daily PRN **AND** bisacodyl (DULCOLAX) EC tablet 5 mg, 5 mg, Oral, Daily PRN **AND** bisacodyl (DULCOLAX) suppository 10 mg, 10 mg, Rectal, Daily PRN, Jhon Burnett MD    budesonide (PULMICORT) nebulizer solution 0.5 mg, 0.5 mg, Nebulization, BID - RT, Jhon Burnett MD, 0.5 mg at 02/20/24 0701    cilostazol (PLETAL) tablet 50 mg, 50 mg, Oral, BID, Jhon Burnett MD, 50 mg at 02/19/24 2250    Enoxaparin Sodium (LOVENOX) syringe 30 mg, 30 mg, Subcutaneous, Nightly, hJon Burnett MD, 30 mg at 02/19/24 2249    ipratropium-albuterol (DUO-NEB) nebulizer solution 3 mL, 3 mL, Nebulization, 4x Daily PRN, Jhon Burnett MD    losartan (COZAAR) tablet 50 mg, 50 mg, Oral, Daily, Jhon Burnett MD    metoprolol tartrate (LOPRESSOR) tablet 50 mg, 50 mg, Oral, BID, Jhon Burnett MD    Morphine sulfate (PF) injection 2 mg, 2 mg, Intravenous, Q4H PRN, 2 mg at 02/19/24 2790 **AND** naloxone (NARCAN) injection 0.4 mg, 0.4 mg, Intravenous, Q5 Min PRN, Jhon Burnett MD    ondansetron (ZOFRAN) injection 4 mg, 4 mg, Intravenous, Q6H PRN, Jhon Burnett MD    Pharmacy to Dose enoxaparin (LOVENOX), , Does not apply, Continuous PRN, Jhon Burnett MD    [COMPLETED] Insert Peripheral IV, , , Once **AND** sodium chloride 0.9 % flush 10 mL, 10 mL, Intravenous, PRN, Sumanth Trejo  "MD PALOMA    sodium chloride 0.9 % flush 10 mL, 10 mL, Intravenous, Q12H, Jhon Burnett MD, 10 mL at 02/20/24 0839    sodium chloride 0.9 % flush 10 mL, 10 mL, Intravenous, PRRAHUL, Jhon Burnett MD    sodium chloride 0.9 % infusion 40 mL, 40 mL, Intravenous, Lex CIFUENTES Roshan, MD    Allergies:   No Known Allergies    Physical Exam:  Vital Signs:   Vitals:    02/19/24 2328 02/20/24 0316 02/20/24 0701 02/20/24 0944   BP: 144/56 147/56 153/54 153/54   BP Location: Left arm Right arm Left arm    Patient Position: Lying Lying Lying    Pulse: 80 85 67    Resp: 18 16 16    Temp: 98.6 °F (37 °C) 98.4 °F (36.9 °C) 99 °F (37.2 °C)    TempSrc: Oral Oral Oral    SpO2: 94% 95% 94%    Weight:  60.2 kg (132 lb 11.5 oz)  60.2 kg (132 lb 11.5 oz)   Height:    165.1 cm (65\")       Physical Exam  Vitals and nursing note reviewed.   Constitutional:       General: She is not in acute distress.     Appearance: Normal appearance. She is well-developed. She is not diaphoretic.   HENT:      Head: Normocephalic and atraumatic.   Eyes:      General: No scleral icterus.     Pupils: Pupils are equal, round, and reactive to light.   Neck:      Trachea: No tracheal deviation.   Cardiovascular:      Rate and Rhythm: Normal rate and regular rhythm.      Heart sounds: Normal heart sounds. No murmur heard.     No friction rub. No gallop.      Comments: Normal JVD.  Pulmonary:      Effort: Pulmonary effort is normal. No respiratory distress.      Breath sounds: Normal breath sounds. No stridor. No wheezing or rales.   Chest:      Chest wall: No tenderness.   Abdominal:      General: Bowel sounds are normal. There is no distension.      Palpations: Abdomen is soft.      Tenderness: There is no abdominal tenderness. There is no guarding or rebound.   Musculoskeletal:         General: Deformity present. No swelling.      Cervical back: Neck supple. No tenderness.   Lymphadenopathy:      Cervical: No cervical adenopathy.   Skin:     General: Skin is warm and " dry.      Findings: No erythema.   Neurological:      General: No focal deficit present.      Mental Status: She is alert and oriented to person, place, and time.   Psychiatric:         Mood and Affect: Mood normal.         Behavior: Behavior normal.         Results Review:   Results from last 7 days   Lab Units 02/20/24  0523   SODIUM mmol/L 133*   POTASSIUM mmol/L 3.8   CHLORIDE mmol/L 98   CO2 mmol/L 24.0   BUN mg/dL 9   CREATININE mg/dL 0.50*   CALCIUM mg/dL 9.5   GLUCOSE mg/dL 115*     Results from last 7 days   Lab Units 02/20/24  0707 02/20/24  0523   HSTROP T ng/L 18* 19*     Results from last 7 days   Lab Units 02/20/24  0523 02/19/24  1546   WBC 10*3/mm3 8.67 8.88   HEMOGLOBIN g/dL 9.8* 9.2*   HEMATOCRIT % 32.9* 29.9*   PLATELETS 10*3/mm3 351 336     Results from last 7 days   Lab Units 02/19/24  1546   INR  1.19*   APTT seconds 28.7*               I personally viewed and interpreted the patient's EKG/Telemetry data     Assessment / Plan:     1.  Preoperative cardiac risk assessment  -- No significant prior cardiac history, however the patient has PID as a CAD equivalent  -- No current chest pain or anginal symptoms  -- ECG on presentation shows a left bundle branch block, which is chronic  -- Echocardiogram shows preserved LVEF, no severe valvular abnormalities  -- No evidence of decompensated CHF, valvulopathy, or arrhythmia  -- The patient is currently at moderate risk based on her revised cardiac risk index, however no anginal symptoms with a functional status of approximately 4 METS it is reasonable to proceed with the proposed surgery without further cardiac testing or interventions  -- Recommend follow-up with her primary cardiologist following discharge    2.  Left hip fracture  --Management per orthopedic surgery      Thank you for allowing me to participate in the care of your patient. Please do not hesitate to contact me with additional questions or concerns.     MASSIEL Burton,  MD  Interventional Cardiology   02/20/24  10:34 EST     Electronically signed by Antony Burton MD at 02/20/24 1040       Ahsan Colindres PA-C at 02/20/24 0816        Consult Orders    1. Inpatient Orthopedic Surgery Consult [[id number]] ordered by Jhon Burnett MD at 02/19/24 1328                     Referring Provider: Hospitalist service  Reason for Consultation: Left hip femoral neck fracture    Patient Care Team:  Leola Wright MD as PCP - General (Internal Medicine)        History of present illness:    83-year-old female is seen today in orthopedic consultation following admission for left hip fracture.  Patient sustained a mechanical fall getting into a vehicle, preparing to travel to [place] for an echocardiogram.  Patient landed on her left side and had immediate onset of left hip pain.  She had difficulty bearing weight thereafter.  She also impacted the left side of her forehead.  She did have some pain in the area, though no neurologic sequela.  She had no loss of consciousness.  She presented to the emergency room where x-rays revealed a left femoral neck fracture.  She was admitted to the hospital and orthopedic consultation was requested.  She does ambulate independently.  Current pain level is 7/10.  She notes increased pain with any movement of the left lower extremity.  She notes some mild pain at the left forehead and periorbital region.  She notes no visual changes, auditory changes or neurologic sequela.    Review of Systems  Pertinent items are noted in HPI, all other systems reviewed and negative    History  Past Medical History:   Diagnosis Date    Allergies     Bronchitis     Cataract, bilateral     s/p surgery    Coronary artery disease     Hyperlipidemia     LBBB (left bundle branch block) 2018    Myocardial infarction     pt denies    PAD (peripheral artery disease)     stent in each leg    Pneumonia     Skin cancer     Wears dentures     upper plate and lower is a partial    ,   Past Surgical History:   Procedure Laterality Date    CAROTID ENDARTERECTOMY Left     CATARACT EXTRACTION W/ INTRAOCULAR LENS  IMPLANT, BILATERAL      COLONOSCOPY      DILATION AND CURETTAGE, DIAGNOSTIC / THERAPEUTIC      OTHER SURGICAL HISTORY Bilateral     stent in each leg    SKIN BIOPSY      SKIN CANCER EXCISION      TONSILLECTOMY     , History reviewed. No pertinent family history.,   Social History     Socioeconomic History    Marital status:    Tobacco Use    Smoking status: Some Days     Packs/day: 0.50     Years: 40.00     Additional pack years: 0.00     Total pack years: 20.00     Types: Cigarettes    Smokeless tobacco: Never   Vaping Use    Vaping Use: Never used   Substance and Sexual Activity    Alcohol use: Never    Drug use: Defer    Sexual activity: Defer     E-cigarette/Vaping    E-cigarette/Vaping Use Never User      E-cigarette/Vaping Substances     E-cigarette/Vaping Devices         , Scheduled Meds:  aspirin, 81 mg, Oral, Daily  atorvastatin, 20 mg, Oral, Nightly  budesonide, 0.5 mg, Nebulization, BID - RT  cilostazol, 50 mg, Oral, BID  enoxaparin, 30 mg, Subcutaneous, Nightly  losartan, 50 mg, Oral, Daily  metoprolol tartrate, 50 mg, Oral, BID  senna-docusate sodium, 2 tablet, Oral, BID  sodium chloride, 10 mL, Intravenous, Q12H   , and Allergies:  Patient has no known allergies.    Objective     Vital Signs   Temp:  [98 °F (36.7 °C)-99 °F (37.2 °C)] 99 °F (37.2 °C)  Heart Rate:  [67-97] 67  Resp:  [16-18] 16  BP: (144-178)/(54-75) 153/54    Physical Exam:     General: Patient is alert and oriented x 3.  No acute distress.  HEENT: Head Exam reveals left periorbital contusion with no open wound.  Pupils are equal reactive to light and accommodating.  Neck is supple with no JVD.  Cranial nerve testing intact 2 through 12.   Cardiovascular: Intact distal pulses in all 4 extremities without edema.  Respiratory: Breaths are easy and unlabored.  No chest wall pain to palpation.  Abdomen:  Soft, nontender, nondistended.  No guarding or rebound.  Neurologic: Intact sensation in all 4 extremities with intact DTRs.  Skin: Intact with no acute lesions noted.  Musculoskeletal: There is no deformity of the left lower extremity.  There is severe pain with any attempted left hip motion, actively or passively.  Positive hip impingement.  Neurovascular status intact left lower Extremity.  No distal edema.  Negative Homans.  No pain at the right lower extremity with hip rotation.    Results Review:   I reviewed the patient's new clinical results.I reviewed the patient's radiology images/studies.      Assessment & Plan       Closed left hip fracture    Peripheral artery disease  Left subcapital femoral neck fracture.    Recommendation at this time is for hemiarthroplasty with bipolar replacement.  We discussed indications, risks, benefits and details of the procedure.  Postoperative expectations were discussed.  We will consider inpatient rehabilitation placement based on patient's progress after surgery.  All questions were answered.  Patient is already nothing by mouth.  Informed consent will be obtained.  We will continue plans for echocardiogram this morning.  Surgery will be pending clearance.      I discussed the patients findings and my recommendations with patient, nursing staff, and consulting provider      All questions were answered.  This patient was evaluated and treated under the supervision of Dr Cedrick Gomez. Dictated by Ahsan Colinders PA-C.    Ahsan Colindres PA-C  02/20/24  08:16 EST            Electronically signed by Ahsan Colindres PA-C at 02/20/24 0821

## 2024-02-20 NOTE — CONSULTS
[facility] Cardiology Consult Note    Tracey Nelson  1940  [id number]  24    Requesting Physician: No ref. provider found    Chief Complaint: Left hip pain    History of Present Illness:   Mrs. Tracey Nelson is a 83 y.o. female who is being seen by Cardiology for preoperative cardiac risk assessment.  The patient has a past medical history significant for chronic left frontal branch block, hyperlipidemia, chronic tobacco use, COPD, peripheral arterial disease with prior lower extremity and carotid interventions.  She denies any significant past cardiac history.  She presented to the [facility] for evaluation of left hip pain after sustaining a fall.  The patient reports she was getting into the car in order to go get an echocardiogram in [place].  She subsequently fell onto her left side.  She reports developing instant left hip discomfort following the fall.  She denies any dizziness or lightheadedness associated with the fall.  No history of presyncopal or syncopal events.  Prior to the fall, the patient reports she was active during the day forming usual daily activities including sweeping and vacuuming.  She denies any exertional chest discomfort.  She does have chronic, mild exertional dyspnea in the setting of underlying COPD with ongoing tobacco use.  No other specific complaints today.    Prior Cardiac Testin. Last Coronary Angio: None  2. Prior Stress Testing: None  3. Last Echo: None  4. Prior Holter Monitor: None    Review of Systems:   Review of Systems   Constitutional:  Negative for activity change, appetite change, chills, diaphoresis, fatigue, fever, unexpected weight gain and unexpected weight loss.   Eyes:  Negative for blurred vision and double vision.   Respiratory:  Positive for shortness of breath. Negative for cough, chest tightness and wheezing.    Cardiovascular:  Negative for chest pain, palpitations and leg swelling.    Gastrointestinal:  Negative for abdominal pain, anal bleeding, blood in stool and GERD.   Endocrine: Negative for cold intolerance and heat intolerance.   Genitourinary:  Negative for hematuria.   Musculoskeletal:         Left hip pain   Neurological:  Negative for dizziness, syncope, weakness and light-headedness.   Hematological:  Does not bruise/bleed easily.   Psychiatric/Behavioral:  Negative for depressed mood and stress. The patient is not nervous/anxious.        Past Medical History:   Past Medical History:   Diagnosis Date    Allergies     Bronchitis     Cataract, bilateral     s/p surgery    Coronary artery disease     Hyperlipidemia     LBBB (left bundle branch block) 2018    Myocardial infarction     pt denies    PAD (peripheral artery disease)     stent in each leg    Pneumonia     Skin cancer     Wears dentures     upper plate and lower is a partial       Past Surgical History:   Past Surgical History:   Procedure Laterality Date    CAROTID ENDARTERECTOMY Left     CATARACT EXTRACTION W/ INTRAOCULAR LENS  IMPLANT, BILATERAL      COLONOSCOPY      DILATION AND CURETTAGE, DIAGNOSTIC / THERAPEUTIC      OTHER SURGICAL HISTORY Bilateral     stent in each leg    SKIN BIOPSY      SKIN CANCER EXCISION      TONSILLECTOMY         Family History: History reviewed. No pertinent family history.    Social History:   Social History     Socioeconomic History    Marital status:    Tobacco Use    Smoking status: Some Days     Packs/day: 0.50     Years: 40.00     Additional pack years: 0.00     Total pack years: 20.00     Types: Cigarettes    Smokeless tobacco: Never   Vaping Use    Vaping Use: Never used   Substance and Sexual Activity    Alcohol use: Never    Drug use: Defer    Sexual activity: Defer       Medications:     Current Facility-Administered Medications:     acetaminophen (TYLENOL) tablet 650 mg, 650 mg, Oral, Q4H PRN **OR** acetaminophen (TYLENOL) 160 MG/5ML oral solution 650 mg, 650 mg, Oral, Q4H  PRN **OR** acetaminophen (TYLENOL) suppository 650 mg, 650 mg, Rectal, Q4H PRN, Jhon Burnett MD    aspirin EC tablet 81 mg, 81 mg, Oral, Daily, Jhon Burnett MD    atorvastatin (LIPITOR) tablet 20 mg, 20 mg, Oral, Nightly, Jhon Burnett MD, 20 mg at 02/19/24 2250    sennosides-docusate (PERICOLACE) 8.6-50 MG per tablet 2 tablet, 2 tablet, Oral, BID **AND** polyethylene glycol (MIRALAX) packet 17 g, 17 g, Oral, Daily PRN **AND** bisacodyl (DULCOLAX) EC tablet 5 mg, 5 mg, Oral, Daily PRN **AND** bisacodyl (DULCOLAX) suppository 10 mg, 10 mg, Rectal, Daily PRN, Jhon Burnett MD    budesonide (PULMICORT) nebulizer solution 0.5 mg, 0.5 mg, Nebulization, BID - RT, Jhon Burnett MD, 0.5 mg at 02/20/24 0701    cilostazol (PLETAL) tablet 50 mg, 50 mg, Oral, BID, Jhon Burnett MD, 50 mg at 02/19/24 2250    Enoxaparin Sodium (LOVENOX) syringe 30 mg, 30 mg, Subcutaneous, Nightly, Jhon Burnett MD, 30 mg at 02/19/24 2249    ipratropium-albuterol (DUO-NEB) nebulizer solution 3 mL, 3 mL, Nebulization, 4x Daily PRN, Jhon Burnett MD    losartan (COZAAR) tablet 50 mg, 50 mg, Oral, Daily, Jhon Burnett MD    metoprolol tartrate (LOPRESSOR) tablet 50 mg, 50 mg, Oral, BID, Jhon Burnett MD    Morphine sulfate (PF) injection 2 mg, 2 mg, Intravenous, Q4H PRN, 2 mg at 02/19/24 2258 **AND** naloxone (NARCAN) injection 0.4 mg, 0.4 mg, Intravenous, Q5 Min PRN, Jhon Burnett MD    ondansetron (ZOFRAN) injection 4 mg, 4 mg, Intravenous, Q6H PRN, Jhon Burnett MD    Pharmacy to Dose enoxaparin (LOVENOX), , Does not apply, Continuous PRNLex Roshan, MD    [COMPLETED] Insert Peripheral IV, , , Once **AND** sodium chloride 0.9 % flush 10 mL, 10 mL, Intravenous, PRN, Sumanth Trejo MD    sodium chloride 0.9 % flush 10 mL, 10 mL, Intravenous, Q12H, Jhon Burnett MD, 10 mL at 02/20/24 0839    sodium chloride 0.9 % flush 10 mL, 10 mL, Intravenous, PRNLex Roshan, MD    sodium chloride 0.9 % infusion 40 mL, 40 mL, Intravenous, PRN, Lex,  "MD Jhon    Allergies:   No Known Allergies    Physical Exam:  Vital Signs:   Vitals:    02/19/24 2328 02/20/24 0316 02/20/24 0701 02/20/24 0944   BP: 144/56 147/56 153/54 153/54   BP Location: Left arm Right arm Left arm    Patient Position: Lying Lying Lying    Pulse: 80 85 67    Resp: 18 16 16    Temp: 98.6 °F (37 °C) 98.4 °F (36.9 °C) 99 °F (37.2 °C)    TempSrc: Oral Oral Oral    SpO2: 94% 95% 94%    Weight:  60.2 kg (132 lb 11.5 oz)  60.2 kg (132 lb 11.5 oz)   Height:    165.1 cm (65\")       Physical Exam  Vitals and nursing note reviewed.   Constitutional:       General: She is not in acute distress.     Appearance: Normal appearance. She is well-developed. She is not diaphoretic.   HENT:      Head: Normocephalic and atraumatic.   Eyes:      General: No scleral icterus.     Pupils: Pupils are equal, round, and reactive to light.   Neck:      Trachea: No tracheal deviation.   Cardiovascular:      Rate and Rhythm: Normal rate and regular rhythm.      Heart sounds: Normal heart sounds. No murmur heard.     No friction rub. No gallop.      Comments: Normal JVD.  Pulmonary:      Effort: Pulmonary effort is normal. No respiratory distress.      Breath sounds: Normal breath sounds. No stridor. No wheezing or rales.   Chest:      Chest wall: No tenderness.   Abdominal:      General: Bowel sounds are normal. There is no distension.      Palpations: Abdomen is soft.      Tenderness: There is no abdominal tenderness. There is no guarding or rebound.   Musculoskeletal:         General: Deformity present. No swelling.      Cervical back: Neck supple. No tenderness.   Lymphadenopathy:      Cervical: No cervical adenopathy.   Skin:     General: Skin is warm and dry.      Findings: No erythema.   Neurological:      General: No focal deficit present.      Mental Status: She is alert and oriented to person, place, and time.   Psychiatric:         Mood and Affect: Mood normal.         Behavior: Behavior normal.         Results " Review:   Results from last 7 days   Lab Units 02/20/24  0523   SODIUM mmol/L 133*   POTASSIUM mmol/L 3.8   CHLORIDE mmol/L 98   CO2 mmol/L 24.0   BUN mg/dL 9   CREATININE mg/dL 0.50*   CALCIUM mg/dL 9.5   GLUCOSE mg/dL 115*     Results from last 7 days   Lab Units 02/20/24  0707 02/20/24  0523   HSTROP T ng/L 18* 19*     Results from last 7 days   Lab Units 02/20/24  0523 02/19/24  1546   WBC 10*3/mm3 8.67 8.88   HEMOGLOBIN g/dL 9.8* 9.2*   HEMATOCRIT % 32.9* 29.9*   PLATELETS 10*3/mm3 351 336     Results from last 7 days   Lab Units 02/19/24  1546   INR  1.19*   APTT seconds 28.7*               I personally viewed and interpreted the patient's EKG/Telemetry data     Assessment / Plan:     1.  Preoperative cardiac risk assessment  -- No significant prior cardiac history, however the patient has PID as a CAD equivalent  -- No current chest pain or anginal symptoms  -- ECG on presentation shows a left bundle branch block, which is chronic  -- Echocardiogram shows preserved LVEF, no severe valvular abnormalities  -- No evidence of decompensated CHF, valvulopathy, or arrhythmia  -- The patient is currently at moderate risk based on her revised cardiac risk index, however no anginal symptoms with a functional status of approximately 4 METS it is reasonable to proceed with the proposed surgery without further cardiac testing or interventions  -- Recommend follow-up with her primary cardiologist following discharge    2.  Left hip fracture  --Management per orthopedic surgery      Thank you for allowing me to participate in the care of your patient. Please do not hesitate to contact me with additional questions or concerns.     MASSIEL Burton MD  Interventional Cardiology   02/20/24  10:34 EST

## 2024-02-20 NOTE — ANESTHESIA PREPROCEDURE EVALUATION
Anesthesia Evaluation     Patient summary reviewed and Nursing notes reviewed   no history of anesthetic complications:   NPO Solid Status: > 8 hours  NPO Liquid Status: > 8 hours           Airway   Mallampati: II  Neck ROM: full  No difficulty expected  Dental      Pulmonary    (+) pneumonia worsening , a smoker Current, COPD,shortness of breath, decreased breath sounds  Cardiovascular   Exercise tolerance: poor (<4 METS)    ECG reviewed  PT is on anticoagulation therapy  Rhythm: regular  Rate: normal    (+) hypertension, past MI , CAD, FLOREZ, PVD, hyperlipidemia    ROS comment: ECHO 2/20/24  ·  Left ventricular systolic function is normal. Calculated left ventricular 3D EF = 55% Left ventricular ejection fraction appears to be 56 - 60%.  ·  Grade II diastolic dysfunction. Left ventricular wall thickness is consistent with mild concentric hypertrophy.  ·  Normal right ventricular size and function.  ·  The right atrial cavity is dilated.  ·  Mild tricuspid valve regurgitation is present. Estimated right ventricular systolic pressure from tricuspid regurgitation is moderately elevated (45-55 mmHg).  ·  There is a moderate (1-2cm) circumferential pericardial effusion measuring largest anteriorly along the RV free wall at 1.6 cm. There is no echocardiographic evidence of cardiac tamponade.         Neuro/Psych- negative ROS  GI/Hepatic/Renal/Endo - negative ROS     Musculoskeletal     (+) arthralgias, myalgias  Abdominal    Substance History - negative use     OB/GYN          Other      history of cancer                      Anesthesia Plan    ASA 4     general with block     intravenous induction     Anesthetic plan, risks, benefits, and alternatives have been provided, discussed and informed consent has been obtained with: patient.  Pre-procedure education provided  Plan discussed with CRNA.        CODE STATUS:    Code Status (Patient has no pulse and is not breathing): CPR (Attempt to Resuscitate)  Medical  Interventions (Patient has pulse or is breathing): Full Support

## 2024-02-20 NOTE — CONSULTS
Referring Provider: Hospitalist service  Reason for Consultation: Left hip femoral neck fracture    Patient Care Team:  Leola Wright MD as PCP - General (Internal Medicine)        History of present illness:    83-year-old female is seen today in orthopedic consultation following admission for left hip fracture.  Patient sustained a mechanical fall getting into a vehicle, preparing to travel to [place] for an echocardiogram.  Patient landed on her left side and had immediate onset of left hip pain.  She had difficulty bearing weight thereafter.  She also impacted the left side of her forehead.  She did have some pain in the area, though no neurologic sequela.  She had no loss of consciousness.  She presented to the emergency room where x-rays revealed a left femoral neck fracture.  She was admitted to the hospital and orthopedic consultation was requested.  She does ambulate independently.  Current pain level is 7/10.  She notes increased pain with any movement of the left lower extremity.  She notes some mild pain at the left forehead and periorbital region.  She notes no visual changes, auditory changes or neurologic sequela.    Review of Systems  Pertinent items are noted in HPI, all other systems reviewed and negative    History  Past Medical History:   Diagnosis Date    Allergies     Bronchitis     Cataract, bilateral     s/p surgery    Coronary artery disease     Hyperlipidemia     LBBB (left bundle branch block) 2018    Myocardial infarction     pt denies    PAD (peripheral artery disease)     stent in each leg    Pneumonia     Skin cancer     Wears dentures     upper plate and lower is a partial   ,   Past Surgical History:   Procedure Laterality Date    CAROTID ENDARTERECTOMY Left     CATARACT EXTRACTION W/ INTRAOCULAR LENS  IMPLANT, BILATERAL      COLONOSCOPY      DILATION AND CURETTAGE, DIAGNOSTIC / THERAPEUTIC      OTHER SURGICAL HISTORY Bilateral     stent in each leg    SKIN BIOPSY       SKIN CANCER EXCISION      TONSILLECTOMY     , History reviewed. No pertinent family history.,   Social History     Socioeconomic History    Marital status:    Tobacco Use    Smoking status: Some Days     Packs/day: 0.50     Years: 40.00     Additional pack years: 0.00     Total pack years: 20.00     Types: Cigarettes    Smokeless tobacco: Never   Vaping Use    Vaping Use: Never used   Substance and Sexual Activity    Alcohol use: Never    Drug use: Defer    Sexual activity: Defer     E-cigarette/Vaping    E-cigarette/Vaping Use Never User      E-cigarette/Vaping Substances     E-cigarette/Vaping Devices         , Scheduled Meds:  aspirin, 81 mg, Oral, Daily  atorvastatin, 20 mg, Oral, Nightly  budesonide, 0.5 mg, Nebulization, BID - RT  cilostazol, 50 mg, Oral, BID  enoxaparin, 30 mg, Subcutaneous, Nightly  losartan, 50 mg, Oral, Daily  metoprolol tartrate, 50 mg, Oral, BID  senna-docusate sodium, 2 tablet, Oral, BID  sodium chloride, 10 mL, Intravenous, Q12H   , and Allergies:  Patient has no known allergies.    Objective     Vital Signs   Temp:  [98 °F (36.7 °C)-99 °F (37.2 °C)] 99 °F (37.2 °C)  Heart Rate:  [67-97] 67  Resp:  [16-18] 16  BP: (144-178)/(54-75) 153/54    Physical Exam:     General: Patient is alert and oriented x 3.  No acute distress.  HEENT: Head Exam reveals left periorbital contusion with no open wound.  Pupils are equal reactive to light and accommodating.  Neck is supple with no JVD.  Cranial nerve testing intact 2 through 12.   Cardiovascular: Intact distal pulses in all 4 extremities without edema.  Respiratory: Breaths are easy and unlabored.  No chest wall pain to palpation.  Abdomen: Soft, nontender, nondistended.  No guarding or rebound.  Neurologic: Intact sensation in all 4 extremities with intact DTRs.  Skin: Intact with no acute lesions noted.  Musculoskeletal: There is no deformity of the left lower extremity.  There is severe pain with any attempted left hip motion,  actively or passively.  Positive hip impingement.  Neurovascular status intact left lower Extremity.  No distal edema.  Negative Homans.  No pain at the right lower extremity with hip rotation.    Results Review:   I reviewed the patient's new clinical results.I reviewed the patient's radiology images/studies.      Assessment & Plan       Closed left hip fracture    Peripheral artery disease  Left subcapital femoral neck fracture.    Recommendation at this time is for hemiarthroplasty with bipolar replacement.  We discussed indications, risks, benefits and details of the procedure.  Postoperative expectations were discussed.  We will consider inpatient rehabilitation placement based on patient's progress after surgery.  All questions were answered.  Patient is already nothing by mouth.  Informed consent will be obtained.  We will continue plans for echocardiogram this morning.  Surgery will be pending clearance.      I discussed the patients findings and my recommendations with patient, nursing staff, and consulting provider      All questions were answered.  This patient was evaluated and treated under the supervision of Dr Cedrick Gomez. Dictated by Ahsan Colindres PA-C.    Ahsan Colindres PA-C  02/20/24  08:16 EST

## 2024-02-20 NOTE — PROGRESS NOTES
I received a call from PACU at 1728 and was requested to return to PACU. I was back in PACU at 1741.   PACU RN stated a Rapid Response was called as per protocol. PACU RN stated pt had dropped O2 saturation when she was laid flat to obtain an Xray of her operative hip. When she was set back into an inclined position following the Xray, pt's sat continued to drop to as low as 77%. Pt was instructed to take deep breaths and to try to cough as instructed per PACU RN staff. Pt was switched from a nasal canula to a non-rebreather mask. Pt was given a duo-neb breathing treatment by PACU RN staff as well. Pt was easily arrousable and answering questions appropriately at 1745 with O2 sat 93-95% on NRB mask. Arrangements were made by the House Supervisor to have pt admitted to ICU vs back to her room she had previously been in on the fourth floor. Pt went to CT scanner prior to ICU to rule out pulmonary emboli.    Will have Anesthesia staff follow up with patient tomorrow (02-) to evaluate current patient status.    Dean Berger CRNA

## 2024-02-20 NOTE — ANESTHESIA PROCEDURE NOTES
Airway  Urgency: elective    Date/Time: 2/20/2024 3:21 PM  Airway not difficult    General Information and Staff    Patient location during procedure: OR  CRNA/CAA: Dean Berger CRNA    Indications and Patient Condition  Indications for airway management: airway protection    Preoxygenated: yes  Mask difficulty assessment: 1 - vent by mask    Final Airway Details  Final airway type: endotracheal airway      Successful airway: ETT  Cuffed: yes   Successful intubation technique: direct laryngoscopy  Facilitating devices/methods: intubating stylet  Endotracheal tube insertion site: oral  Blade: Cain  Blade size: 4  ETT size (mm): 7.5  Cormack-Lehane Classification: grade I - full view of glottis  Placement verified by: chest auscultation and capnometry   Measured from: lips  ETT/EBT  to lips (cm): 21  Number of attempts at approach: 1  Assessment: lips, teeth, and gum same as pre-op and atraumatic intubation    Additional Comments  Dentition and Lips as preoperative assessment. Airway placed without complication. ETT cuff inflated to minimal occlusive pressure.             A/Ox4. VSS on RA. BS hypoactive,-flatus, -bm.  LS clear. Skin: 3 lap sites WDL, no vaginal bleeded noted. CMS: intact. Pain: controlled with scheduled toradol. Up w/ SBA. Tolerating full liquid diet. Busby pulled this AM, due to void, Plan to discharge home today.

## 2024-02-21 LAB
ACANTHOCYTES BLD QL SMEAR: NORMAL
ANION GAP SERPL CALCULATED.3IONS-SCNC: 13.6 MMOL/L (ref 5–15)
BASOPHILS # BLD AUTO: 0.02 10*3/MM3 (ref 0–0.2)
BASOPHILS NFR BLD AUTO: 0.2 % (ref 0–1.5)
BUN SERPL-MCNC: 17 MG/DL (ref 8–23)
BUN/CREAT SERPL: 25.8 (ref 7–25)
CALCIUM SPEC-SCNC: 9.8 MG/DL (ref 8.6–10.5)
CHLORIDE SERPL-SCNC: 98 MMOL/L (ref 98–107)
CO2 SERPL-SCNC: 21.4 MMOL/L (ref 22–29)
CREAT SERPL-MCNC: 0.66 MG/DL (ref 0.57–1)
D-LACTATE SERPL-SCNC: 1.5 MMOL/L (ref 0.5–2)
D-LACTATE SERPL-SCNC: 2.5 MMOL/L (ref 0.5–2)
D-LACTATE SERPL-SCNC: 3 MMOL/L (ref 0.5–2)
DEPRECATED RDW RBC AUTO: 50.4 FL (ref 37–54)
EGFRCR SERPLBLD CKD-EPI 2021: 86.6 ML/MIN/1.73
EOSINOPHIL # BLD AUTO: 0 10*3/MM3 (ref 0–0.4)
EOSINOPHIL NFR BLD AUTO: 0 % (ref 0.3–6.2)
ERYTHROCYTE [DISTWIDTH] IN BLOOD BY AUTOMATED COUNT: 16.2 % (ref 12.3–15.4)
GLUCOSE SERPL-MCNC: 262 MG/DL (ref 65–99)
HCT VFR BLD AUTO: 36.8 % (ref 34–46.6)
HGB BLD-MCNC: 11 G/DL (ref 12–15.9)
HYPOCHROMIA BLD QL: NORMAL
IMM GRANULOCYTES # BLD AUTO: 0.05 10*3/MM3 (ref 0–0.05)
IMM GRANULOCYTES NFR BLD AUTO: 0.4 % (ref 0–0.5)
LYMPHOCYTES # BLD AUTO: 0.61 10*3/MM3 (ref 0.7–3.1)
LYMPHOCYTES NFR BLD AUTO: 5 % (ref 19.6–45.3)
MCH RBC QN AUTO: 25.3 PG (ref 26.6–33)
MCHC RBC AUTO-ENTMCNC: 29.9 G/DL (ref 31.5–35.7)
MCV RBC AUTO: 84.6 FL (ref 79–97)
MONOCYTES # BLD AUTO: 0.65 10*3/MM3 (ref 0.1–0.9)
MONOCYTES NFR BLD AUTO: 5.4 % (ref 5–12)
NEUTROPHILS NFR BLD AUTO: 10.75 10*3/MM3 (ref 1.7–7)
NEUTROPHILS NFR BLD AUTO: 89 % (ref 42.7–76)
NRBC BLD AUTO-RTO: 0 /100 WBC (ref 0–0.2)
PLATELET # BLD AUTO: 425 10*3/MM3 (ref 140–450)
PMV BLD AUTO: 9 FL (ref 6–12)
POTASSIUM SERPL-SCNC: 4 MMOL/L (ref 3.5–5.2)
RBC # BLD AUTO: 4.35 10*6/MM3 (ref 3.77–5.28)
SMALL PLATELETS BLD QL SMEAR: NORMAL
SODIUM SERPL-SCNC: 133 MMOL/L (ref 136–145)
WBC MORPH BLD: NORMAL
WBC NRBC COR # BLD AUTO: 12.08 10*3/MM3 (ref 3.4–10.8)

## 2024-02-21 PROCEDURE — 85007 BL SMEAR W/DIFF WBC COUNT: CPT | Performed by: ORTHOPAEDIC SURGERY

## 2024-02-21 PROCEDURE — 25010000002 HYDRALAZINE PER 20 MG: Performed by: INTERNAL MEDICINE

## 2024-02-21 PROCEDURE — 85025 COMPLETE CBC W/AUTO DIFF WBC: CPT | Performed by: ORTHOPAEDIC SURGERY

## 2024-02-21 PROCEDURE — 25010000002 CEFTRIAXONE SODIUM-DEXTROSE 1-3.74 GM-%(50ML) RECONSTITUTED SOLUTION: Performed by: STUDENT IN AN ORGANIZED HEALTH CARE EDUCATION/TRAINING PROGRAM

## 2024-02-21 PROCEDURE — 94640 AIRWAY INHALATION TREATMENT: CPT

## 2024-02-21 PROCEDURE — 97166 OT EVAL MOD COMPLEX 45 MIN: CPT

## 2024-02-21 PROCEDURE — 87040 BLOOD CULTURE FOR BACTERIA: CPT | Performed by: STUDENT IN AN ORGANIZED HEALTH CARE EDUCATION/TRAINING PROGRAM

## 2024-02-21 PROCEDURE — 80048 BASIC METABOLIC PNL TOTAL CA: CPT | Performed by: ORTHOPAEDIC SURGERY

## 2024-02-21 PROCEDURE — 25010000002 HALOPERIDOL LACTATE PER 5 MG: Performed by: STUDENT IN AN ORGANIZED HEALTH CARE EDUCATION/TRAINING PROGRAM

## 2024-02-21 PROCEDURE — 83605 ASSAY OF LACTIC ACID: CPT | Performed by: STUDENT IN AN ORGANIZED HEALTH CARE EDUCATION/TRAINING PROGRAM

## 2024-02-21 PROCEDURE — 25010000002 LORAZEPAM PER 2 MG: Performed by: FAMILY MEDICINE

## 2024-02-21 PROCEDURE — 97162 PT EVAL MOD COMPLEX 30 MIN: CPT

## 2024-02-21 PROCEDURE — 99232 SBSQ HOSP IP/OBS MODERATE 35: CPT | Performed by: STUDENT IN AN ORGANIZED HEALTH CARE EDUCATION/TRAINING PROGRAM

## 2024-02-21 PROCEDURE — 25010000002 HEPARIN (PORCINE) PER 1000 UNITS: Performed by: ORTHOPAEDIC SURGERY

## 2024-02-21 RX ORDER — HALOPERIDOL 5 MG/ML
5 INJECTION INTRAMUSCULAR EVERY 6 HOURS PRN
Status: DISCONTINUED | OUTPATIENT
Start: 2024-02-21 | End: 2024-02-24 | Stop reason: HOSPADM

## 2024-02-21 RX ORDER — LORAZEPAM 2 MG/ML
0.5 INJECTION INTRAMUSCULAR EVERY 6 HOURS PRN
Status: DISCONTINUED | OUTPATIENT
Start: 2024-02-21 | End: 2024-02-24 | Stop reason: HOSPADM

## 2024-02-21 RX ORDER — HYDRALAZINE HYDROCHLORIDE 20 MG/ML
10 INJECTION INTRAMUSCULAR; INTRAVENOUS EVERY 6 HOURS PRN
Status: DISCONTINUED | OUTPATIENT
Start: 2024-02-21 | End: 2024-02-24 | Stop reason: HOSPADM

## 2024-02-21 RX ORDER — CEFTRIAXONE 1 G/50ML
1000 INJECTION, SOLUTION INTRAVENOUS EVERY 24 HOURS
Status: DISCONTINUED | OUTPATIENT
Start: 2024-02-21 | End: 2024-02-24 | Stop reason: HOSPADM

## 2024-02-21 RX ORDER — NICOTINE 21 MG/24HR
1 PATCH, TRANSDERMAL 24 HOURS TRANSDERMAL
Status: DISCONTINUED | OUTPATIENT
Start: 2024-02-21 | End: 2024-02-24 | Stop reason: HOSPADM

## 2024-02-21 RX ORDER — BUSPIRONE HYDROCHLORIDE 10 MG/1
10 TABLET ORAL 2 TIMES DAILY PRN
Status: DISCONTINUED | OUTPATIENT
Start: 2024-02-21 | End: 2024-02-24 | Stop reason: HOSPADM

## 2024-02-21 RX ADMIN — HALOPERIDOL LACTATE 5 MG: 5 INJECTION, SOLUTION INTRAMUSCULAR at 23:21

## 2024-02-21 RX ADMIN — ATORVASTATIN CALCIUM 20 MG: 20 TABLET, FILM COATED ORAL at 23:21

## 2024-02-21 RX ADMIN — HEPARIN SODIUM 5000 UNITS: 5000 INJECTION, SOLUTION INTRAVENOUS; SUBCUTANEOUS at 23:17

## 2024-02-21 RX ADMIN — Medication 1 PATCH: at 18:12

## 2024-02-21 RX ADMIN — BUDESONIDE 0.5 MG: 0.5 INHALANT RESPIRATORY (INHALATION) at 19:50

## 2024-02-21 RX ADMIN — HEPARIN SODIUM 5000 UNITS: 5000 INJECTION, SOLUTION INTRAVENOUS; SUBCUTANEOUS at 13:12

## 2024-02-21 RX ADMIN — HYDRALAZINE HYDROCHLORIDE 10 MG: 20 INJECTION, SOLUTION INTRAMUSCULAR; INTRAVENOUS at 06:19

## 2024-02-21 RX ADMIN — HEPARIN SODIUM 5000 UNITS: 5000 INJECTION, SOLUTION INTRAVENOUS; SUBCUTANEOUS at 06:06

## 2024-02-21 RX ADMIN — FAMOTIDINE 40 MG: 20 TABLET, FILM COATED ORAL at 08:22

## 2024-02-21 RX ADMIN — LORAZEPAM 0.5 MG: 2 INJECTION INTRAMUSCULAR; INTRAVENOUS at 17:58

## 2024-02-21 RX ADMIN — ASPIRIN 81 MG: 81 TABLET, COATED ORAL at 08:22

## 2024-02-21 RX ADMIN — METOPROLOL TARTRATE 50 MG: 50 TABLET ORAL at 23:15

## 2024-02-21 RX ADMIN — HALOPERIDOL LACTATE 5 MG: 5 INJECTION, SOLUTION INTRAMUSCULAR at 16:08

## 2024-02-21 RX ADMIN — Medication 10 ML: at 23:20

## 2024-02-21 RX ADMIN — CILOSTAZOL 50 MG: 100 TABLET ORAL at 23:21

## 2024-02-21 RX ADMIN — OXYCODONE HYDROCHLORIDE AND ACETAMINOPHEN 1 TABLET: 5; 325 TABLET ORAL at 00:21

## 2024-02-21 RX ADMIN — Medication 10 ML: at 08:22

## 2024-02-21 RX ADMIN — LOSARTAN POTASSIUM 50 MG: 50 TABLET, FILM COATED ORAL at 08:22

## 2024-02-21 RX ADMIN — CILOSTAZOL 50 MG: 100 TABLET ORAL at 08:22

## 2024-02-21 RX ADMIN — METOPROLOL TARTRATE 50 MG: 50 TABLET ORAL at 08:22

## 2024-02-21 RX ADMIN — CEFTRIAXONE 1000 MG: 1 INJECTION, SOLUTION INTRAVENOUS at 13:53

## 2024-02-21 RX ADMIN — ACETAMINOPHEN 650 MG: 325 TABLET, FILM COATED ORAL at 13:12

## 2024-02-21 RX ADMIN — DOCUSATE SODIUM 50MG AND SENNOSIDES 8.6MG 2 TABLET: 8.6; 5 TABLET, FILM COATED ORAL at 08:22

## 2024-02-21 RX ADMIN — Medication 10 ML: at 08:23

## 2024-02-21 NOTE — THERAPY EVALUATION
Patient Name: Tracey Nelson  : 1940    MRN: [id number]                              Today's Date: 2024       Admit Date: 2024    Visit Dx:     ICD-10-CM ICD-9-CM   1. Closed fracture of left hip, initial encounter  S72.002A 820.8   2. Closed hip fracture  S72.009A 820.8   3. Abnormal CT of the chest  R93.89 793.2   4. Chronic cough  R05.3 786.2     Patient Active Problem List   Diagnosis    Abnormal CT of the chest    Chronic cough    Closed left hip fracture    Peripheral artery disease     Past Medical History:   Diagnosis Date    Allergies     Bronchitis     Cataract, bilateral     s/p surgery    Coronary artery disease     Hyperlipidemia     LBBB (left bundle branch block) 2018    Myocardial infarction     pt denies    PAD (peripheral artery disease)     stent in each leg    Pneumonia     Skin cancer     Wears dentures     upper plate and lower is a partial     Past Surgical History:   Procedure Laterality Date    CAROTID ENDARTERECTOMY Left     CATARACT EXTRACTION W/ INTRAOCULAR LENS  IMPLANT, BILATERAL      COLONOSCOPY      DILATION AND CURETTAGE, DIAGNOSTIC / THERAPEUTIC      HIP BIPOLAR REPLACEMENT Left 2024    Procedure: HIP BIPOLAR REPLACEMENT LEFT;  Surgeon: Cedrick Gomez MD;  Location: [facility];  Service: Orthopedics;  Laterality: Left;    OTHER SURGICAL HISTORY Bilateral     stent in each leg    SKIN BIOPSY      SKIN CANCER EXCISION      TONSILLECTOMY        General Information       Row Name 24 1127          Physical Therapy Time and Intention    Document Type evaluation (P)   -KB     Mode of Treatment physical therapy (P)   -KB       Row Name 24 1127          General Information    Patient Profile Reviewed yes (P)   -KB     Prior Level of Function independent:;all household mobility (P)   -KB     Existing Precautions/Restrictions fall;hip, posterior;oxygen therapy device and L/min (P)   -KB     Barriers to Rehab medically complex;previous functional  deficit;cognitive status (P)   -KB       Row Name 02/21/24 1127          Living Environment    People in Home grandchild(patsy) (P)   -KB     Name(s) of People in Home leighann Fontanez (P)   -KB       Row Name 02/21/24 1127          Cognition    Orientation Status (Cognition) oriented to;person (P)   -KB       Row Name 02/21/24 1127          Safety Issues, Functional Mobility    Safety Issues Affecting Function (Mobility) awareness of need for assistance;impulsivity;insight into deficits/self-awareness;judgment;sequencing abilities;safety precautions follow-through/compliance;safety precaution awareness;positioning of assistive device (P)   -KB     Impairments Affecting Function (Mobility) balance;cognition;coordination;endurance/activity tolerance;motor control;shortness of breath (P)   -KB     Cognitive Impairments, Mobility Safety/Performance awareness, need for assistance;insight into deficits/self-awareness;judgment;problem-solving/reasoning;safety precaution awareness;safety precaution follow-through;sequencing abilities (P)   -KB               User Key  (r) = Recorded By, (t) = Taken By, (c) = Cosigned By      Initials Name Provider Type    Magali Taylor, PT Student PT Student                   Mobility       Row Name 02/21/24 1129          Bed Mobility    Bed Mobility supine-sit (P)   -KB     Supine-Sit Kaiser (Bed Mobility) minimum assist (75% patient effort) (P)   -KB     Assistive Device (Bed Mobility) bed rails;head of bed elevated (P)   -KB       Row Name 02/21/24 1129          Bed-Chair Transfer    Bed-Chair Kaiser (Transfers) moderate assist (50% patient effort);2 person assist (P)   -KB     Assistive Device (Bed-Chair Transfers) walker, front-wheeled (P)   -KB       Row Name 02/21/24 1129          Sit-Stand Transfer    Sit-Stand Kaiser (Transfers) moderate assist (50% patient effort);2 person assist (P)   -KB     Assistive Device (Sit-Stand Transfers) walker, front-wheeled (P)   -KB        Row Name 02/21/24 1129          Gait/Stairs (Locomotion)    [place] Level (Gait) moderate assist (50% patient effort);2 person assist (P)   -KB     Assistive Device (Gait) walker, front-wheeled (P)   -KB     Patient was able to Ambulate yes (P)   -KB     Distance in Feet (Gait) 3 (P)   -KB     Deviations/Abnormal Patterns (Gait) asher decreased;ataxic;stride length decreased;weight shifting decreased;gait speed decreased;bilateral deviations;base of support, wide (P)   -KB     Bilateral Gait Deviations forward flexed posture;weight shift ability decreased (P)   -KB       Row Name 02/21/24 1129          Mobility    Extremity Weight-bearing Status left lower extremity (P)   -KB     Left Lower Extremity (Weight-bearing Status) weight-bearing as tolerated (WBAT) (P)   -KB               User Key  (r) = Recorded By, (t) = Taken By, (c) = Cosigned By      Initials Name Provider Type    Magali Taylor, PT Student PT Student                   Obj/Interventions       Row Name 02/21/24 1132          Range of Motion Comprehensive    General Range of Motion lower extremity range of motion deficits identified (P)   -KB     Comment, General Range of Motion hip precautions (P)   -KB       Row Name 02/21/24 1132          Strength Comprehensive (MMT)    General Manual Muscle Testing (MMT) Assessment lower extremity strength deficits identified (P)   -KB     Comment, General Manual Muscle Testing (MMT) Assessment grossly 3+/5 (P)   -KB       Row Name 02/21/24 1132          Balance    Balance Assessment sitting static balance;sitting dynamic balance;sit to stand dynamic balance;standing static balance;standing dynamic balance (P)   -KB     Static Sitting Balance contact guard (P)   -KB     Dynamic Sitting Balance contact guard (P)   -KB     Position, Sitting Balance unsupported;sitting edge of bed;sitting in chair (P)   -KB     Sit to Stand Dynamic Balance moderate assist;2-person assist (P)   -KB     Static Standing  Balance minimal assist;1-person assist (P)   -KB     Dynamic Standing Balance moderate assist;2-person assist (P)   -KB     Position/Device Used, Standing Balance walker, front-wheeled (P)   -KB       Row Name 02/21/24 1132          Sensory Assessment (Somatosensory)    Sensory Assessment (Somatosensory) sensation intact (P)   -KB       Row Name 02/21/24 1132          General Lower Extremity Assessment (Range of Motion)    Lower Extremity: Range of Motion RLE ROM WFL (P)   -KB       Row Name 02/21/24 1132          Lower Extremity (Manual Muscle Testing)    Lower Extremity: Manual Muscle Testing (MMT) left LE strength is WFL;right LE strength is WFL (P)   -KB               User Key  (r) = Recorded By, (t) = Taken By, (c) = Cosigned By      Initials Name Provider Type    Magali Taylor, PT Student PT Student                   Goals/Plan       Row Name 02/21/24 1150          Bed Mobility Goal 1 (PT)    Activity/Assistive Device (Bed Mobility Goal 1, PT) bed mobility activities, all (P)   -KB     [place] Level/Cues Needed (Bed Mobility Goal 1, PT) supervision required (P)   -KB     Time Frame (Bed Mobility Goal 1, PT) short term goal (STG);by discharge (P)   -KB     Progress/Outcomes (Bed Mobility Goal 1, PT) goal ongoing (P)   -KB       Row Name 02/21/24 1150          Transfer Goal 1 (PT)    Activity/Assistive Device (Transfer Goal 1, PT) transfers, all (P)   -KB     [place] Level/Cues Needed (Transfer Goal 1, PT) supervision required (P)   -KB     Time Frame (Transfer Goal 1, PT) short term goal (STG);by discharge (P)   -KB     Progress/Outcome (Transfer Goal 1, PT) goal ongoing (P)   -KB       Row Name 02/21/24 1150          Gait Training Goal 1 (PT)    Activity/Assistive Device (Gait Training Goal 1, PT) gait (walking locomotion);assistive device use (P)   -KB     [place] Level (Gait Training Goal 1, PT) contact guard required (P)   -KB     Time Frame (Gait Training Goal 1, PT) short term goal  (STG);by discharge (P)   -KB     Progress/Outcome (Gait Training Goal 1, PT) goal ongoing (P)   -KB       Row Name 02/21/24 1150          Therapy Assessment/Plan (PT)    Planned Therapy Interventions (PT) balance training;bed mobility training;gait training;transfer training;patient/family education;strengthening (P)   -KB               User Key  (r) = Recorded By, (t) = Taken By, (c) = Cosigned By      Initials Name Provider Type    Magali Taylor, PT Student PT Student                   Clinical Impression       Row Name 02/21/24 1135          Pain    Pretreatment Pain Rating 7/10 (P)   -KB     Posttreatment Pain Rating 7/10 (P)   -KB     Pain Location - Side/Orientation Left (P)   -KB     Pain Location lateral (P)   -KB     Pain Location - hip (P)   -KB     Pre/Posttreatment Pain Comment at the beginning but no pain during gait. (P)   -KB     Pain Intervention(s) Ambulation/increased activity;Repositioned (P)   -KB       Row Name 02/21/24 1136          Plan of Care Review    Plan of Care Reviewed With patient;sibling (P)   -KB     Progress no change (P)   -KB     Outcome Evaluation PT eval completed today. Patient presented supine in bed with sister at bedside. Patient oriented only to person today. Patient and sister states patient was previously independent with walking, and all ADLs. Patient states her grandson lives with her, and desire to return home upon DC with family care. Patient c/o 7/10 pain in left hip s/p replacement yesterday. Patient satting 90%, 88%, 90% on 3L of oxygen. Patient initial BP: 131/108, mid 159/72, end 151/89. Patient performed supine to sit with Shirley and heavy Vocal and tactile cuing to not let left leg ADDuct. Patient performed STS from EOB to front wheeled walker with modA x2 with vocal cues to bring hips forward, lift head and stand up straight/tall. Patient performed 3ft ambulation from bed to chair with very short, choppy steps barely lifting her feet off the ground. Patient  required heavy vocal and tactile cues to look up/don't bend forward, push down into the walker with her hands to offload the leg shes trying to move, and  her feet more when she takes a step without making too wide of a stance. Patient has very hard time remembering her precautions, needs continual reinforcement. Patient would benefit from continued skilled intervention to increase global strength and stability with use of walker to ambulate prior to DC. Patient recommended either rehab or home with 24/7 care and continual precaution enforcement. (P)   -KB       Row Name 02/21/24 1135          Therapy Assessment/Plan (PT)    Rehab Potential (PT) good, to achieve stated therapy goals (P)   -KB     Criteria for Skilled Interventions Met (PT) yes;skilled treatment is necessary (P)   -KB     Therapy Frequency (PT) 5 times/wk (P)   -KB     Predicted Duration of Therapy Intervention (PT) 2 weeks (P)   -KB       Row Name 02/21/24 1135          Vital Signs    Pre Systolic BP Rehab 131 (P)   -KB     Pre Treatment Diastolic  (P)   -KB     Intra Systolic BP Rehab 159 (P)   -KB     Intra Treatment Diastolic BP 72 (P)   -KB     Post Systolic BP Rehab 151 (P)   -KB     Post Treatment Diastolic BP 89 (P)   -KB     Pre SpO2 (%) 90 (P)   -KB     O2 Delivery Pre Treatment supplemental O2 (P)   -KB     Intra SpO2 (%) 88 (P)   -KB     O2 Delivery Intra Treatment supplemental O2 (P)   -KB     Post SpO2 (%) 90 (P)   -KB     O2 Delivery Post Treatment supplemental O2 (P)   -KB     Pre Patient Position Supine (P)   -KB     Intra Patient Position Standing (P)   -KB     Post Patient Position Sitting (P)   -KB       Row Name 02/21/24 1135          Positioning and Restraints    Pre-Treatment Position in bed (P)   -KB     Post Treatment Position chair (P)   -KB     In Chair notified nsg;call light within reach;sitting;encouraged to call for assist;exit alarm on;patient within staff view (P)   -KB               User Key  (r) =  Recorded By, (t) = Taken By, (c) = Cosigned By      Initials Name Provider Type    Magali Taylor, PT Student PT Student                   Outcome Measures       Row Name 02/21/24 1204          How much help from another person do you currently need...    Turning from your back to your side while in flat bed without using bedrails? 3 (P)   -KB     Moving from lying on back to sitting on the side of a flat bed without bedrails? 2 (P)   -KB     Moving to and from a bed to a chair (including a wheelchair)? 2 (P)   -KB     Standing up from a chair using your arms (e.g., wheelchair, bedside chair)? 2 (P)   -KB     Climbing 3-5 steps with a railing? 1 (P)   -KB     To walk in hospital room? 2 (P)   -KB     AM-PAC 6 Clicks Score (PT) 12 (P)   -KB     Highest Level of Mobility Goal 4 --> Transfer to chair/commode (P)   -KB       Row Name 02/21/24 1204          Functional Assessment    Outcome Measure Options AM-PAC 6 Clicks Basic Mobility (PT) (P)   -KB               User Key  (r) = Recorded By, (t) = Taken By, (c) = Cosigned By      Initials Name Provider Type    Magali Taylor, PT Student PT Student                                 Physical Therapy Education       Title: PT OT SLP Therapies (Done)       Topic: Physical Therapy (Done)       Point: Mobility training (Done)       Learning Progress Summary             Patient Eager, E, VU,NR by GIACOMO at 2/21/2024 1205   Family Eager, E, VU,NR by GIACOMO at 2/21/2024 1205                         Point: Home exercise program (Done)       Learning Progress Summary             Patient Eager, E, VU,NR by GIACOMO at 2/21/2024 1205   Family Eager, E, VU,NR by GIACOMO at 2/21/2024 1205                         Point: Body mechanics (Done)       Learning Progress Summary             Patient Eager, E, VU,NR by GIACOMO at 2/21/2024 1205   Family Eager, E, VU,NR by GIACOMO at 2/21/2024 1205                         Point: Precautions (Done)       Learning Progress Summary             Patient Eager, E, VU,NR by GIACOMO at  2/21/2024 1205   Family Eager, E, DAVIN,NR by GIACOMO at 2/21/2024 1205                                         User Key       Initials Effective Dates Name Provider Type Discipline    GIACOMO 01/18/24 -  Magali Nieves, PT Student PT Student PT                  PT Recommendation and Plan  Planned Therapy Interventions (PT): (P) balance training, bed mobility training, gait training, transfer training, patient/family education, strengthening  Plan of Care Reviewed With: (P) patient, sibling  Progress: (P) no change  Outcome Evaluation: (P) PT eval completed today. Patient presented supine in bed with sister at bedside. Patient oriented only to person today. Patient and sister states patient was previously independent with walking, and all ADLs. Patient states her grandson lives with her, and desire to return home upon DC with family care. Patient c/o 7/10 pain in left hip s/p replacement yesterday. Patient satting 90%, 88%, 90% on 3L of oxygen. Patient initial BP: 131/108, mid 159/72, end 151/89. Patient performed supine to sit with Shirley and heavy Vocal and tactile cuing to not let left leg ADDuct. Patient performed STS from EOB to front wheeled walker with modA x2 with vocal cues to bring hips forward, lift head and stand up straight/tall. Patient performed 3ft ambulation from bed to chair with very short, choppy steps barely lifting her feet off the ground. Patient required heavy vocal and tactile cues to look up/don't bend forward, push down into the walker with her hands to offload the leg shes trying to move, and  her feet more when she takes a step without making too wide of a stance. Patient has very hard time remembering her precautions, needs continual reinforcement. Patient would benefit from continued skilled intervention to increase global strength and stability with use of walker to ambulate prior to DC. Patient recommended either rehab or home with 24/7 care and continual precaution enforcement.     Time  Calculation:   PT Evaluation Complexity  History, PT Evaluation Complexity: (P) 3 or more personal factors and/or comorbidities  Examination of Body Systems (PT Eval Complexity): (P) total of 3 or more elements  Clinical Presentation (PT Evaluation Complexity): (P) evolving  Clinical Decision Making (PT Evaluation Complexity): (P) moderate complexity  Overall Complexity (PT Evaluation Complexity): (P) moderate complexity     PT Charges       Row Name 02/21/24 1206             Time Calculation    Start Time 0945 (P)   -KB      PT Received On 02/21/24 (P)   -KB      PT Goal Re-Cert Due Date 03/02/24 (P)   -KB         Untimed Charges    PT Eval/Re-eval Minutes 42 (P)   -KB         Total Minutes    Untimed Charges Total Minutes 42 (P)   -KB       Total Minutes 42 (P)   -KB                User Key  (r) = Recorded By, (t) = Taken By, (c) = Cosigned By      Initials Name Provider Type    Magali Taylor, PT Student PT Student                  Therapy Charges for Today       Code Description Service Date Service Provider Modifiers Qty    [id number]  PT EVAL MOD COMPLEXITY 3 2/21/2024 Magali Nieves, PT Student GP 1            PT G-Codes  Outcome Measure Options: (P) AM-PAC 6 Clicks Basic Mobility (PT)  AM-PAC 6 Clicks Score (PT): (P) 12  PT Discharge Summary  Anticipated Discharge Disposition (PT): (P) home with assist, home with 24/7 care, home with home health, inpatient rehabilitation facility, home with supervision    Magali Nieves, PT Student  2/21/2024

## 2024-02-21 NOTE — NURSING NOTE
Nurse alerted to patient bedside by bed alarm. Found patient attempting to exit bed. Attempted to redirect patient, and return her to bed. She became belligerent, cursing at staff. She was disoriented to location, time and situation. She was hallucinating family members in the room. I reminded patient that she had hip surgery the previous day and was in danger of injuring her hip. She was not receptive to redirection. She removed her sp02 probe and blood pressure cuff and refused to allow me to replace it, swatting my hand and attempting to grab my name badge. Patient removed her nasal cannula but did allow me to replace it. Provider notified. New orders received. Med administered as ordered. Patient repositioned in bed, call light in reach, bed alarm on.

## 2024-02-21 NOTE — PLAN OF CARE
Goal Outcome Evaluation:  Plan of Care Reviewed With: (P) patient, sibling        Progress: (P) no change  Outcome Evaluation: (P) PT eval completed today. Patient presented supine in bed with sister at bedside. Patient oriented only to person today. Patient and sister states patient was previously independent with walking, and all ADLs. Patient states her grandson lives with her, and desire to return home upon DC with family care. Patient c/o 7/10 pain in left hip s/p replacement yesterday. Patient satting 90%, 88%, 90% on 3L of oxygen. Patient initial BP: 131/108, mid 159/72, end 151/89. Patient performed supine to sit with Shirley and heavy Vocal and tactile cuing to not let left leg ADDuct. Patient performed STS from EOB to front wheeled walker with modA x2 with vocal cues to bring hips forward, lift head and stand up straight/tall. Patient performed 3ft ambulation from bed to chair with very short, choppy steps barely lifting her feet off the ground. Patient required heavy vocal and tactile cues to look up/don't bend forward, push down into the walker with her hands to offload the leg shes trying to move, and  her feet more when she takes a step without making too wide of a stance. Patient has very hard time remembering her precautions, needs continual reinforcement. Patient would benefit from continued skilled intervention to increase global strength and stability with use of walker to ambulate prior to DC. Patient recommended either rehab or home with 24/7 care and continual precaution enforcement.      Anticipated Discharge Disposition (PT): (P) home with assist, home with 24/7 care, home with home health, inpatient rehabilitation facility, home with supervision

## 2024-02-21 NOTE — THERAPY EVALUATION
Patient Name: Tracey Nelson  : 1940    MRN: [id number]                              Today's Date: 2024       Admit Date: 2024    Visit Dx:     ICD-10-CM ICD-9-CM   1. Closed fracture of left hip, initial encounter  S72.002A 820.8   2. Closed hip fracture  S72.009A 820.8   3. Abnormal CT of the chest  R93.89 793.2   4. Chronic cough  R05.3 786.2     Patient Active Problem List   Diagnosis    Abnormal CT of the chest    Chronic cough    Closed left hip fracture    Peripheral artery disease     Past Medical History:   Diagnosis Date    Allergies     Bronchitis     Cataract, bilateral     s/p surgery    Coronary artery disease     Hyperlipidemia     LBBB (left bundle branch block) 2018    Myocardial infarction     pt denies    PAD (peripheral artery disease)     stent in each leg    Pneumonia     Skin cancer     Wears dentures     upper plate and lower is a partial     Past Surgical History:   Procedure Laterality Date    CAROTID ENDARTERECTOMY Left     CATARACT EXTRACTION W/ INTRAOCULAR LENS  IMPLANT, BILATERAL      COLONOSCOPY      DILATION AND CURETTAGE, DIAGNOSTIC / THERAPEUTIC      HIP BIPOLAR REPLACEMENT Left 2024    Procedure: HIP BIPOLAR REPLACEMENT LEFT;  Surgeon: Cedrick Gomez MD;  Location: [facility];  Service: Orthopedics;  Laterality: Left;    OTHER SURGICAL HISTORY Bilateral     stent in each leg    SKIN BIOPSY      SKIN CANCER EXCISION      TONSILLECTOMY        General Information       Row Name 24 1320          OT Time and Intention    Document Type evaluation  -SD     Mode of Treatment occupational therapy  -SD       Row Name 24 1320          General Information    Patient Profile Reviewed yes  -SD     Prior Level of Function independent:;all household mobility;ADL's;community mobility  Lives with grandson, sister lives nearby. No DME/AD  -SD     Existing Precautions/Restrictions fall;oxygen therapy device and L/min;hip, posterior  -SD     Barriers to Rehab  cognitive status  -SD       Row Name 02/21/24 1320          Living Environment    People in Home grandchild(patsy)  -SD       Row Name 02/21/24 1320          Home Main Entrance    Number of Stairs, Main Entrance none  -SD       Row Name 02/21/24 1320          Stairs Within Home, Primary    Number of Stairs, Within Home, Primary none  -SD       Row Name 02/21/24 1320          Cognition    Orientation Status (Cognition) oriented to;person  -SD       Row Name 02/21/24 1320          Safety Issues, Functional Mobility    Safety Issues Affecting Function (Mobility) safety precautions follow-through/compliance;safety precaution awareness;sequencing abilities;insight into deficits/self-awareness;friction/shear risk;awareness of need for assistance;at risk behavior observed;judgment;positioning of assistive device  -SD     Impairments Affecting Function (Mobility) balance;cognition;coordination;endurance/activity tolerance;motor control;motor planning;pain;postural/trunk control;shortness of breath;strength  -SD               User Key  (r) = Recorded By, (t) = Taken By, (c) = Cosigned By      Initials Name Provider Type    SD Cait Pacheco OT Occupational Therapist                     Mobility/ADL's       Row Name 02/21/24 1324          Bed Mobility    Bed Mobility supine-sit  -SD     Supine-Sit [place] (Bed Mobility) minimum assist (75% patient effort)  -SD     Bed Mobility, Safety Issues decreased use of arms for pushing/pulling;decreased use of legs for bridging/pushing;impaired trunk control for bed mobility  -SD     Assistive Device (Bed Mobility) bed rails;head of bed elevated  -SD       Row Name 02/21/24 1324          Transfers    Transfers sit-stand transfer;bed-chair transfer  -SD       Row Name 02/21/24 1324          Bed-Chair Transfer    Bed-Chair [place] (Transfers) moderate assist (50% patient effort);2 person assist  -SD     Assistive Device (Bed-Chair Transfers) walker, front-wheeled  -SD       Row  Name 02/21/24 1324          Sit-Stand Transfer    Sit-Stand [place] (Transfers) moderate assist (50% patient effort);2 person assist  -SD     Assistive Device (Sit-Stand Transfers) walker, front-wheeled  -SD       Row Name 02/21/24 1324          Functional Mobility    Functional Mobility- Ind. Level moderate assist (50% patient effort);2 person assist required  -SD     Functional Mobility- Device walker, front-wheeled  -SD     Functional Mobility-Distance (Feet) 3  -SD     Functional Mobility- Safety Issues balance decreased during turns;sequencing ability decreased;step length decreased;weight-shifting ability decreased;supplemental O2  -SD       Row Name 02/21/24 1324          Activities of Daily Living    BADL Assessment/Intervention bathing;upper body dressing;lower body dressing;grooming;feeding;toileting  -[facility] Name 02/21/24 1324          Bathing Assessment/Intervention    [place] Level (Bathing) upper body;minimum assist (75% patient effort);lower body;maximum assist (25% patient effort)  -SD       Row Name 02/21/24 1324          Upper Body Dressing Assessment/Training    [place] Level (Upper Body Dressing) contact guard assist  -[facility] Name 02/21/24 132          Lower Body Dressing Assessment/Training    [place] Level (Lower Body Dressing) maximum assist (25% patient effort)  -SD       Row Name 02/21/24 1324          Grooming Assessment/Training    [place] Level (Grooming) minimum assist (75% patient effort)  -[facility] Name 02/21/24 1324          Self-Feeding Assessment/Training    [place] Level (Feeding) supervision  -SD       Row Name 02/21/24 1324          Toileting Assessment/Training    [place] Level (Toileting) maximum assist (25% patient effort)  -SD               User Key  (r) = Recorded By, (t) = Taken By, (c) = Cosigned By      Initials Name Provider Type    Cait Sauceda OT Occupational Therapist                   Obj/Interventions        Row Name 02/21/24 1326          Range of Motion Comprehensive    General Range of Motion bilateral upper extremity ROM WFL  -SD       Row Name 02/21/24 1326          Strength Comprehensive (MMT)    General Manual Muscle Testing (MMT) Assessment upper extremity strength deficits identified  -SD     Comment, General Manual Muscle Testing (MMT) Assessment UB 3+/5  -SD               User Key  (r) = Recorded By, (t) = Taken By, (c) = Cosigned By      Initials Name Provider Type    SD Cait Pacheco OT Occupational Therapist                   Goals/Plan       Row Name 02/21/24 1337          Transfer Goal 1 (OT)    Activity/Assistive Device (Transfer Goal 1, OT) sit-to-stand/stand-to-sit;walker, rolling  -SD     [place] Level/Cues Needed (Transfer Goal 1, OT) minimum assist (75% or more patient effort)  -SD     Time Frame (Transfer Goal 1, OT) long term goal (LTG)  -SD     Progress/Outcome (Transfer Goal 1, OT) goal ongoing  -SD       Row Name 02/21/24 1337          Dressing Goal 1 (OT)    Activity/Device (Dressing Goal 1, OT) lower body dressing;reacher;sock-aid  -SD     [place]/Cues Needed (Dressing Goal 1, OT) minimum assist (75% or more patient effort)  -SD     Time Frame (Dressing Goal 1, OT) 2 weeks  -SD     Progress/Outcome (Dressing Goal 1, OT) goal ongoing  -SD       Row Name 02/21/24 1337          Toileting Goal 1 (OT)    Activity/Device (Toileting Goal 1, OT) toileting skills, all;commode, bedside without drop arms  -SD     [place] Level/Cues Needed (Toileting Goal 1, OT) minimum assist (75% or more patient effort)  -SD     Time Frame (Toileting Goal 1, OT) 2 weeks  -SD     Progress/Outcome (Toileting Goal 1, OT) goal ongoing  -SD       Row Name 02/21/24 1337          Strength Goal 1 (OT)    Strength Goal 1 (OT) Patient to perform UB ther ex as tolerated  -SD     Time Frame (Strength Goal 1, OT) long term goal (LTG)  -SD     Progress/Outcome (Strength Goal 1, OT) goal ongoing  -SD       [facility]  Name 02/21/24 6745          Therapy Assessment/Plan (OT)    Planned Therapy Interventions (OT) activity tolerance training;adaptive equipment training;BADL retraining;patient/caregiver education/training;strengthening exercise;transfer/mobility retraining;ROM/therapeutic exercise  -SD               User Key  (r) = Recorded By, (t) = Taken By, (c) = Cosigned By      Initials Name Provider Type    SD Cait Pacheco OT Occupational Therapist                   Clinical Impression       Row Name 02/21/24 1322          Pain Assessment    Pretreatment Pain Rating 7/10  -SD     Posttreatment Pain Rating 7/10  -SD     Pain Location - Side/Orientation Left  -SD     Pain Location - hip  -SD     Pain Intervention(s) Repositioned;Ambulation/increased activity  -SD       Row Name 02/21/24 1329          Plan of Care Review    Plan of Care Reviewed With patient  -SD     Progress no change  -SD     Outcome Evaluation OT eval completed. Patient is supine in bed, sister is present at bedside. Patient c/o 7/10 left hip pain at rest. Patient is oriented to person only. Patient's sister reports she lives with her grandson in a one level home. Patient normally ind with  mobility, community mobility and ADLs, has no DME/AD at home. Patient educated on ARNOLDO posterior hip precautions prior to mobility. Patient performed supine to sit with min A, therapist assisting with LLE and preventing internal rotation. Patient requires SBA-Min A for UB self care, max A for LB self care and toileting d/t posterior hip precautions. Patient is expected to benefit from continued OT services prior to DC, DC plan being STR or home with  and 24/7 care. If patient does return home will need a RW and BSC.  -SD       Row Name 02/21/24 9721          Therapy Assessment/Plan (OT)    Patient/Family Therapy Goal Statement (OT) home  -SD     Rehab Potential (OT) good, to achieve stated therapy goals  -SD     Criteria for Skilled Therapeutic Interventions Met (OT)  skilled treatment is necessary  -SD     Therapy Frequency (OT) 3 times/wk  5 times if indicated  -SD       Row Name 02/21/24 1327          Therapy Plan Review/Discharge Plan (OT)    Equipment Needs Upon Discharge (OT) walker, rolling;shower chair;hip kit;commode chair  -SD     Anticipated Discharge Disposition (OT) home with home health;home with 24/7 care;home with assist;inpatient rehabilitation facility  -SD       Row Name 02/21/24 1327          Vital Signs    Pre SpO2 (%) 92  -SD     O2 Delivery Pre Treatment supplemental O2  -SD     Intra SpO2 (%) 89  -SD     O2 Delivery Intra Treatment supplemental O2  -SD     Post SpO2 (%) 91  -SD     O2 Delivery Post Treatment supplemental O2  -SD       Row Name 02/21/24 1327          Positioning and Restraints    Pre-Treatment Position in bed  -SD     Post Treatment Position chair  -SD     In Chair sitting;call light within reach;encouraged to call for assist;ABD pillow  -SD               User Key  (r) = Recorded By, (t) = Taken By, (c) = Cosigned By      Initials Name Provider Type    Cait Sauceda OT Occupational Therapist                   Outcome Measures       Row Name 02/21/24 1338          How much help from another is currently needed...    Putting on and taking off regular lower body clothing? 2  -SD     Bathing (including washing, rinsing, and drying) 2  -SD     Toileting (which includes using toilet bed pan or urinal) 2  -SD     Putting on and taking off regular upper body clothing 3  -SD     Taking care of personal grooming (such as brushing teeth) 3  -SD     Eating meals 3  -SD     AM-PAC 6 Clicks Score (OT) 15  -SD       Row Name 02/21/24 1204          How much help from another person do you currently need...    Turning from your back to your side while in flat bed without using bedrails? 3 (P)   -KB     Moving from lying on back to sitting on the side of a flat bed without bedrails? 2 (P)   -KB     Moving to and from a bed to a chair (including a  wheelchair)? 2 (P)   -KB     Standing up from a chair using your arms (e.g., wheelchair, bedside chair)? 2 (P)   -KB     Climbing 3-5 steps with a railing? 1 (P)   -KB     To walk in hospital room? 2 (P)   -KB     AM-PAC 6 Clicks Score (PT) 12 (P)   -KB     Highest Level of Mobility Goal 4 --> Transfer to chair/commode (P)   -KB       Row Name 02/21/24 1338 02/21/24 1204       Functional Assessment    Outcome Measure Options AM-PAC 6 Clicks Daily Activity (OT)  -SD AM-PAC 6 Clicks Basic Mobility (PT) (P)   -KB              User Key  (r) = Recorded By, (t) = Taken By, (c) = Cosigned By      Initials Name Provider Type    Cait Sauceda, OT Occupational Therapist    Magali Taylor, PT Student PT Student                    Occupational Therapy Education       Title: PT OT SLP Therapies (In Progress)       Topic: Occupational Therapy (In Progress)       Point: ADL training (Done)       Description:   Instruct learner(s) on proper safety adaptation and remediation techniques during self care or transfers.   Instruct in proper use of assistive devices.                  Learning Progress Summary             Patient Acceptance, E,TB, VU by SD at 2/21/2024 0248    Comment: OT POC                         Point: Home exercise program (Not Started)       Description:   Instruct learner(s) on appropriate technique for monitoring, assisting and/or progressing therapeutic exercises/activities.                  Learner Progress:  Not documented in this visit.              Point: Precautions (Not Started)       Description:   Instruct learner(s) on prescribed precautions during self-care and functional transfers.                  Learner Progress:  Not documented in this visit.              Point: Body mechanics (Not Started)       Description:   Instruct learner(s) on proper positioning and spine alignment during self-care, functional mobility activities and/or exercises.                  Learner Progress:  Not documented in  this visit.                              User Key       Initials Effective Dates Name Provider Type Discipline    SD 06/16/21 -  Cait Pacheco OT Occupational Therapist OT                  OT Recommendation and Plan  Planned Therapy Interventions (OT): activity tolerance training, adaptive equipment training, BADL retraining, patient/caregiver education/training, strengthening exercise, transfer/mobility retraining, ROM/therapeutic exercise  Therapy Frequency (OT): 3 times/wk (5 times if indicated)  Plan of Care Review  Plan of Care Reviewed With: patient  Progress: no change  Outcome Evaluation: OT eval completed. Patient is supine in bed, sister is present at bedside. Patient c/o 7/10 left hip pain at rest. Patient is oriented to person only. Patient's sister reports she lives with her grandson in a one level home. Patient normally ind with  mobility, community mobility and ADLs, has no DME/AD at home. Patient educated on ARNOLDO posterior hip precautions prior to mobility. Patient performed supine to sit with min A, therapist assisting with LLE and preventing internal rotation. Patient requires SBA-Min A for UB self care, max A for LB self care and toileting d/t posterior hip precautions. Patient is expected to benefit from continued OT services prior to DC, DC plan being STR or home with  and 24/7 care. If patient does return home will need a RW and BSC.     Time Calculation:   Evaluation Complexity (OT)  Review Occupational Profile/Medical/Therapy History Complexity: expanded/moderate complexity  Assessment, Occupational Performance/Identification of Deficit Complexity: 3-5 performance deficits  Clinical Decision Making Complexity (OT): detailed assessment/moderate complexity  Overall Complexity of Evaluation (OT): moderate complexity     Time Calculation- OT       Row Name 02/21/24 1339             Time Calculation- OT    OT Start Time 0948  -SD      OT Received On 02/21/24  -SD      OT Goal Re-Cert Due  Date 03/04/24  -SD         Untimed Charges    OT Eval/Re-eval Minutes 45  -SD         Total Minutes    Untimed Charges Total Minutes 45  -SD       Total Minutes 45  -SD                User Key  (r) = Recorded By, (t) = Taken By, (c) = Cosigned By      Initials Name Provider Type    Cait Sauceda OT Occupational Therapist                  Therapy Charges for Today       Code Description Service Date Service Provider Modifiers Qty    [id number] HC OT EVAL MOD COMPLEXITY 3 2/21/2024 Cait Pacheco OT GO 1                 Cait Pacheco OT  2/21/2024

## 2024-02-21 NOTE — PLAN OF CARE
Goal Outcome Evaluation:      Vitals stable.  Some elevated bps, but respond to bp meds.  Denies pain when asked, but is very apparently in pain when moved at all.  Percocet given as ordered, but pt seemed to have some increased confusion with this.

## 2024-02-21 NOTE — PROGRESS NOTES
[facility]    Name:  Tracey Nelson   Age:  83 y.o.  Sex:  female  :  1940  MRN:  [id number]   Visit Number:  [id number]  Admission Date:  2024  Date Of Service:  24  Primary Care Physician:  Leola Wright MD     LOS: 2 days :    Chief Complaint:      Follow-up; hip pain    Subjective:    Feels good, minimal pain in her hip when she stays still.  Worse with any movement of the hip.  Ate well this morning.    Hospital Course:    Tracey Nelson is an 83-year-old female with history of coronary artery disease, left bundle branch block, hypertension, peripheral artery disease was brought to the emergency room by her friend after she sustained a fall and developed left hip pain.  Patient was with her friend who brought her to the emergency room.  Patient apparently was going to get an echocardiogram done at [facility] and her friend was driving but this happened while she was getting into the car.  Patient apparently hit her left forehead as well.  Patient states that she does have peripheral arterial disease and takes aspirin and has had stents in her legs.  She denies any prior history of coronary artery disease or coronary stents.  At time of admission only reporting concern of mild headache and left hip pain.     In the emergency room, she was afebrile and hemodynamically stable except for elevated blood pressure of 164/58.  Initial pulse oxygen saturation was 94% on room air but she subsequently dropped to 87% and had to be placed on 2 L of nasal cannula oxygen.  Blood work including CMP and CBC was unremarkable except for a sodium of 132 and a hemoglobin of 9.2 (baseline 11).  INR 1.19.  CT of the head and CT of the cervical spine were unremarkable.  Chest x-ray showed abnormal retrocardiac density left lung base which she has been following up with Dr. Ramirez.  X-ray of the hip with pelvis showed left subcapital femoral neck fracture.   Patient's condition was discussed with Dr. Gomez and the patient was subsequently admitted to the medical floor for treatment of left hip fracture.    Review of Systems:     All systems were reviewed and negative except as mentioned in subjective, assessment and plan.    Vital Signs:    Temp:  [97.2 °F (36.2 °C)-98.6 °F (37 °C)] 98.2 °F (36.8 °C)  Heart Rate:  [56-90] 73  Resp:  [15-25] 18  BP: (114-189)/() 164/59    Intake and output:    I/O last 3 completed shifts:  In: 1678 [P.O.:440; I.V.:1238]  Out: 575 [Urine:275; Blood:300]  I/O this shift:  In: 480 [P.O.:480]  Out: -     Physical Examination:    General Appearance:  Alert and confused.   Head:  Atraumatic and normocephalic.   Eyes: Conjunctivae and sclerae normal, no icterus. No pallor.   Throat: No oral lesions, no thrush, oral mucosa moist.   Neck: Supple, trachea midline, no thyromegaly.   Lungs:   Breath sounds heard bilaterally equally.  No wheezing or crackles. No Pleural rub or bronchial breathing.   Heart:  Normal S1 and S2, no murmur, no gallop, no rub. No JVD.   Abdomen:   Normal bowel sounds, no masses, no organomegaly. Soft, nontender, nondistended, no rebound tenderness.   Extremities: Supple, no edema, no cyanosis, no clubbing.   Skin: Warm.   Neurologic: Frankly confused. No facial asymmetry. Moves all four limbs. No tremors.      Laboratory results:    Results from last 7 days   Lab Units 02/21/24 0418 02/20/24 0523 02/19/24  1546   SODIUM mmol/L 133* 133* 132*   POTASSIUM mmol/L 4.0 3.8 3.7   CHLORIDE mmol/L 98 98 96*   CO2 mmol/L 21.4* 24.0 23.3   BUN mg/dL 17 9 9   CREATININE mg/dL 0.66 0.50* 0.42*   CALCIUM mg/dL 9.8 9.5 9.2   GLUCOSE mg/dL 262* 115* 96     Results from last 7 days   Lab Units 02/21/24 0418 02/20/24 0523 02/19/24  1546   WBC 10*3/mm3 12.08* 8.67 8.88   HEMOGLOBIN g/dL 11.0* 9.8* 9.2*   HEMATOCRIT % 36.8 32.9* 29.9*   PLATELETS 10*3/mm3 425 351 336     Results from last 7 days   Lab Units 02/19/24  1546   INR   "1.19*     Results from last 7 days   Lab Units 02/20/24  0707 02/20/24  0523   HSTROP T ng/L 18* 19*         No results for input(s): \"PHART\", \"DBE9FNG\", \"PO2ART\", \"FIE0DVC\", \"BASEEXCESS\" in the last 8760 hours.   I have reviewed the patient's laboratory results.    Radiology results:    XR Hip 1 View Without Pelvis Left (Surgery Only)    Result Date: 2/20/2024  PROCEDURE: XR HIP 1 VIEW WO PELVIS LEFT-  1 VIEW  HISTORY: left hip replacement; S72.002A-Fracture of unspecified part of neck of left femur, initial encounter for closed fracture; S72.009A-Fracture of unspecified part of neck of unspecified femur, initial encounter for closed fracture  COMPARISON: Preoperative exam 02/19/2024.  FINDINGS:  One view shows intraoperative view acquired during left hip arthroplasty procedure. Femoral prosthesis is noted. There has been resection of the left femoral head. Right hip joint is obscured.      Impression: Limited intraoperative imaging acquired during left hip arthroplasty procedure.     This report was signed and finalized on 2/20/2024 8:46 PM by Ahsan Hui MD.      XR Chest 1 View    Result Date: 2/20/2024  FINAL REPORT TECHNIQUE: An AP portable view of the chest was obtained. CLINICAL HISTORY: desating rapidly FINDINGS: There is moderate cardiomegaly.  There is left lower lobe consolidation and a small left pleural effusion.  There is no pneumothorax.. There is no adenopathy or significant osseous abnormality.     Impression: Left lower lobe consolidation worrisome for pneumonia.  Small left pleural effusion. Authenticated and Electronically Signed by Robby Lancaster M.D. on 02/20/2024 07:17:34 PM    XR Hip With or Without Pelvis 2 - 3 View Left    Result Date: 2/20/2024  FINAL REPORT TECHNIQUE: A single view of the left hip was obtained. CLINICAL HISTORY: s/p L hip sarina FINDINGS: There are postoperative changes of left hip sarina-arthroplasty. Orthopedic hardware is intact and normally articulated.  There is no " fracture.  There are vascular calcifications.  The right hip is unremarkable.     Impression: Vascular calcifications and expected postoperative findings. Authenticated and Electronically Signed by Robby Lancaster M.D. on 02/20/2024 07:17:03 PM    CT Angiogram Chest Pulmonary Embolism    Result Date: 2/20/2024  FINAL REPORT TECHNIQUE: Thin section axial images were obtained through the chest and during the arterial phase of IV contrast administration. Coronal 3-D MIP reconstructed images were also provided. CLINICAL HISTORY: Pulmonary embolism (PE) suspected, neg D-dimer soa, ams, s/p hip replacement in recovery FINDINGS: The pulmonary arteries are well-opacified and there is no filling defect to indicate pulmonary embolism. The thoracic aorta is normal.  There is a small to moderate pericardial effusion.  There is dense left lower lobe consolidation consistent with pneumonia.  There is a small left pleural effusion and mild right lung atelectasis.  There is mild emphysema.  There is no adenopathy or significant osseous abnormality.     Impression: No pulmonary embolism. Left lower lobe pneumonia with small left pleural effusion. Small to moderate pericardial effusion. Authenticated and Electronically Signed by Robby Lancaster M.D. on 02/20/2024 07:13:07 PM    Peripheral Block    Result Date: 2/20/2024  Dean Berger CRNA     2/20/2024  2:41 PM Peripheral Block Pre-sedation assessment completed: 2/20/2024 2:22 PM Patient reassessed immediately prior to procedure Start time: 2/20/2024 2:29 PM Stop time: 2/20/2024 2:34 PM Reason for block: at surgeon's request and post-op pain management Performed by CRNA/CAA: Dean Berger CRNA Preanesthetic Checklist Completed: patient identified, IV checked, site marked, risks and benefits discussed, surgical consent, monitors and equipment checked, pre-op evaluation and timeout performed Prep: Pt Position: supine Sterile barriers:cap, gloves and mask Prep: ChloraPrep Patient  monitoring: EKG, continuous pulse oximetry and blood pressure monitoring Procedure Sedation: yes Performed under: MAC Guidance:ultrasound guided ULTRASOUND INTERPRETATION.  Using ultrasound guidance a 21 G gauge needle was placed in close proximity to the nerve, at which point, under ultrasound guidance anesthetic was injected in the area of the nerve and spread of the anesthesia was seen on ultrasound in close proximity thereto.  There were no abnormalities seen on ultrasound; a digital image was taken; and the patient tolerated the procedure with no complications. Images:still images obtained Laterality:left Block Type:fascia iliaca compartment Injection Technique:single-shot Needle Type:echogenic Needle Gauge:21 G Resistance on Injection: none Medications Used: bupivacaine PF (MARCAINE) injection 0.5% - Injection  20 mL - 2/20/2024 2:34:00 PM lidocaine PF 2% (XYLOCAINE) injection - Injection  15 mL - 2/20/2024 2:34:00 PM Medications Preservative Free Saline:10ml Comment:Adjuncts per total volume of LA: Decadron 10 mg PSF If required, intravenous sedation was given -- see meds on anesthesia record. Post Assessment Injection Assessment: negative aspiration for heme, no paresthesia on injection and incremental injection Patient Tolerance:comfortable throughout block Complications:no Additional Notes Procedure:          FASCIA ILIACA BLOCK                             Patient analgesia was achieved with IV Sedation( see meds)   Pt was placed in the supine position.   The insertion site was prepped in sterile fashion with Chloraprep.  A BBraun echogenic needle was advance In-plane under ultrasound guidance. The Anterior superior Iliac crest was initially visualized and the probe was directed slightly medially and slightly towards the umbilicus.  The course of the needle was tracked over the sartorius muscle through the fascia Iliacus and into the anterior portion of the Iliacus muscle.  Major vessels where identified  and avoided as were structures of the peritoneal cavity.  LA injection was made incrementally in 1-5 ml amounts and spread was visualized superiorly below the fascia iliacus.  Injection was completed with negative aspiration of blood and negative intravascular injection.  Injection pressures were normal or minimal resistance.     Adult Transthoracic Echo Complete W/ Cont if Necessary Per Protocol    Result Date: 2/20/2024    Left ventricular systolic function is normal. Calculated left ventricular 3D EF = 55% Left ventricular ejection fraction appears to be 56 - 60%.   Grade II diastolic dysfunction. Left ventricular wall thickness is consistent with mild concentric hypertrophy.   Normal right ventricular size and function.   The right atrial cavity is dilated.   Mild tricuspid valve regurgitation is present. Estimated right ventricular systolic pressure from tricuspid regurgitation is moderately elevated (45-55 mmHg).   There is a moderate (1-2cm) circumferential pericardial effusion measuring largest anteriorly along the RV free wall at 1.6 cm. There is no echocardiographic evidence of cardiac tamponade.    I have reviewed the patient's radiology reports.    Medication Review:     I have reviewed the patient's active and prn medications.     Problem List:      Closed left hip fracture    Peripheral artery disease      Assessment:     Status post mechanical fall and left closed hip fracture, POA.  Peripheral artery disease with previous peripheral stents.  Coronary artery disease on medical therapy.  Essential hypertension.  COPD, no exacerbation.     Plan:     Comfortable in chair, ate well this morning.  Respiratory status improved significantly overnight, down to 2 L.  CT showed left lower lobe pneumonia, started Rocephin.    Sister updated at bedside.    Delirium, suspected  Continue to monitor in the ICU.  Fall precautions.  Haldol as needed.  Do not suspect stroke.  If her confusion is persisting, may  consider MRI.  Family denies history of dementia.    Left lower lobe pneumonia  Rocephin.  May have aspiration.    Left hip fracture.  - Continue morphine, Lortab and Zofran for symptomatic control.  - [facility] ORIF 2/20  - Cardiology assessed moderate risk, no contraindications to surgery.     PAD/CAD  - Continue home medications including aspirin, Pletal, simvastatin.     COPD.  - No evidence of exacerbation.  - Continue bronchodilators as needed.     Risk Assessment: Moderate  DVT Prophylaxis: Lovenox  Code Status: Full  Diet: Cardiac  Discharge Plan: At least a couple more days, pending PT/OT    Brian Joseph Kerley,   02/21/24  16:55 EST    Dictated utilizing Dragon dictation.

## 2024-02-21 NOTE — CONSULTS
Patient: Tracey Nelson  Procedure(s):  HIP BIPOLAR REPLACEMENT LEFT  Anesthesia type: general with block    Patient location: ICU  Last vitals:   Vitals:    02/21/24 0730   BP:    Pulse:    Resp:    Temp: 97.9 °F (36.6 °C)   SpO2: 95%     Level of consciousness: awake, alert, and oriented    Post-anesthesia pain: adequate analgesia  Airway patency: patent  Respiratory: unassisted, nasal cannula  Cardiovascular: stable and blood pressure at baseline  Hydration: euvolemic    Anesthetic complications: no

## 2024-02-21 NOTE — PLAN OF CARE
Goal Outcome Evaluation:  Plan of Care Reviewed With: patient        Progress: no change  Outcome Evaluation: OT eval completed. Patient is supine in bed, sister is present at bedside. Patient c/o 7/10 left hip pain at rest. Patient is oriented to person only. Patient's sister reports she lives with her grandson in a one level home. Patient normally ind with  mobility, community mobility and ADLs, has no DME/AD at home. Patient educated on ARNOLDO posterior hip precautions prior to mobility. Patient performed supine to sit with min A, therapist assisting with LLE and preventing internal rotation. Patient requires SBA-Min A for UB self care, max A for LB self care and toileting d/t posterior hip precautions. Patient is expected to benefit from continued OT services prior to DC, DC plan being STR or home with  and 24/7 care. If patient does return home will need a RW and BSC.      Anticipated Discharge Disposition (OT): home with home health, home with 24/7 care, home with assist, inpatient rehabilitation facility

## 2024-02-21 NOTE — PROGRESS NOTES
"Dietitian Assessment    Patient Name: Tracey Nelson  YOB: 1940  MRN: [id number]  Admission date: 2/19/2024    Comment:    RD to order Boost [facility] daily d/t BMI <19. RD to f/up.    Clinical Nutrition Assessment      Reason for Assessment BMI   H&P  Past Medical History:   Diagnosis Date    Allergies     Bronchitis     Cataract, bilateral     s/p surgery    Coronary artery disease     Hyperlipidemia     LBBB (left bundle branch block) 2018    Myocardial infarction     pt denies    PAD (peripheral artery disease)     stent in each leg    Pneumonia     Skin cancer     Wears dentures     upper plate and lower is a partial       Past Surgical History:   Procedure Laterality Date    CAROTID ENDARTERECTOMY Left     CATARACT EXTRACTION W/ INTRAOCULAR LENS  IMPLANT, BILATERAL      COLONOSCOPY      DILATION AND CURETTAGE, DIAGNOSTIC / THERAPEUTIC      HIP BIPOLAR REPLACEMENT Left 2/20/2024    Procedure: HIP BIPOLAR REPLACEMENT LEFT;  Surgeon: Cedrick Gomez MD;  Location: [facility];  Service: Orthopedics;  Laterality: Left;    OTHER SURGICAL HISTORY Bilateral     stent in each leg    SKIN BIOPSY      SKIN CANCER EXCISION      TONSILLECTOMY              Current Problems        Encounter Information        Trending Narrative     2/21: Pt is in the ICU s/p L hip hemiarthroplasty. Most EMR wts are less than 100#. Will order Boost VHC d/t pt is underweight r/t BMI for age.     Anthropometrics        Current Height, Weight Height: 165.1 cm (65\")  Weight: 44.9 kg (98 lb 15.8 oz) (02/21/24 0400)   Trending Weight Hx     This admission:              PTA:     Wt Readings from Last 30 Encounters:   02/21/24 0400 44.9 kg (98 lb 15.8 oz)   02/20/24 0944 60.2 kg (132 lb 11.5 oz)   02/20/24 0316 60.2 kg (132 lb 11.5 oz)   02/19/24 1734 60.5 kg (133 lb 6.1 oz)   02/19/24 1337 42.6 kg (94 lb)   12/24/23 2153 47.2 kg (104 lb)   08/30/23 1021 41.5 kg (91 lb 9.6 oz)   11/27/22 1129 42.2 kg (93 lb)   10/16/22 0225 43.1 kg " "(95 lb)   09/02/22 1308 41.3 kg (91 lb)   06/30/22 1321 41.7 kg (92 lb)   09/01/21 1533 42.2 kg (93 lb)   03/27/17 1329 47.6 kg (105 lb)   01/14/14 1429 48.3 kg (106 lb 8.1 oz)      BMI kg/m2 Body mass index is 16.47 kg/m².     Labs        Pertinent Labs     Results from last 7 days   Lab Units 02/21/24  0418 02/20/24  0523 02/19/24  1546   SODIUM mmol/L 133* 133* 132*   POTASSIUM mmol/L 4.0 3.8 3.7   CHLORIDE mmol/L 98 98 96*   CO2 mmol/L 21.4* 24.0 23.3   BUN mg/dL 17 9 9   CREATININE mg/dL 0.66 0.50* 0.42*   CALCIUM mg/dL 9.8 9.5 9.2   GLUCOSE mg/dL 262* 115* 96       Results from last 7 days   Lab Units 02/21/24  0418   HEMOGLOBIN g/dL 11.0*   HEMATOCRIT % 36.8       No results found for: \"HGBA1C\"         Medications       Scheduled Medications aspirin, 81 mg, Oral, Daily  atorvastatin, 20 mg, Oral, Nightly  budesonide, 0.5 mg, Nebulization, BID - RT  cilostazol, 50 mg, Oral, BID  ethyl alcohol, 2 Swab, Nasal, Once  famotidine, 40 mg, Oral, Daily  heparin (porcine), 5,000 Units, Subcutaneous, Q8H  losartan, 50 mg, Oral, Daily  metoprolol tartrate, 50 mg, Oral, BID  senna-docusate sodium, 2 tablet, Oral, BID  sodium chloride, 10 mL, Intravenous, Q12H  sodium chloride, 10 mL, Intravenous, Q12H  sodium chloride, 10 mL, Intravenous, Q12H        Infusions sodium chloride, 50 mL/hr, Last Rate: 50 mL/hr (02/20/24 1936)         PRN Medications   acetaminophen **OR** acetaminophen **OR** acetaminophen    senna-docusate sodium **AND** polyethylene glycol **AND** bisacodyl **AND** bisacodyl    hydrALAZINE    ipratropium-albuterol    Morphine **AND** naloxone    Morphine **AND** naloxone    ondansetron    ondansetron ODT    oxyCODONE-acetaminophen    [COMPLETED] Insert Peripheral IV **AND** sodium chloride    sodium chloride    sodium chloride    sodium chloride    sodium chloride    sodium chloride     Physical Findings        Trending Physical   Appearance, NFPE    --  Edema  No edema noted     Bowel Function No BM " documented at this time     Tubes Peripheral IV     Chewing/Swallowing WNL     Skin WNL       Estimated/Assessed Needs       Energy Requirements    EST Needs, Method, Wt used 5156-4132 kcal (30-35 kcal/kg CBW)       Protein Requirements    EST Needs, Method, Wt used 54-67 g pro (1.2-1.5 g pro/kg CBW)       Fluid Requirements     Estimated Needs (mL/day) 4130-1882 mL       Current Nutrition Orders & Evaluation of Intake       Oral Nutrition     Food Allergies NKFA   Current PO Diet Diet: Regular/House Diet; Texture: Regular Texture (IDDSI 7); Fluid Consistency: Thin (IDDSI 0)   Supplement    PO Evaluation     Trending % PO Intake None recorded at this time       Nutrition Diagnosis         Nutrition Dx Problem 1 Underweight r/t COPD AEB BMI=16.47.      Nutrition Dx Problem 2        Intervention Goal         Intervention Goal(s) Maintain CBW/Promote wt gain  PO intake meet >50% of estimated needs  Adhere to ONS     Nutrition Intervention        RD Action Will order Boost [facility]     Nutrition Prescription          Diet Prescription Regular   Supplement Prescription Boost VHC     Monitor/Evaluation        Monitor Per protocol, I&O, PO intake, Supplement intake, Pertinent labs, Weight, Skin status, GI status, Symptoms, Swallow function     RD to f/up    Electronically signed by:  Ladan Gomez RD  02/21/24 07:15 EST

## 2024-02-22 LAB
ANION GAP SERPL CALCULATED.3IONS-SCNC: 11.3 MMOL/L (ref 5–15)
BACTERIA UR QL AUTO: ABNORMAL /HPF
BASOPHILS # BLD AUTO: 0.02 10*3/MM3 (ref 0–0.2)
BASOPHILS NFR BLD AUTO: 0.1 % (ref 0–1.5)
BILIRUB UR QL STRIP: NEGATIVE
BUN SERPL-MCNC: 16 MG/DL (ref 8–23)
BUN/CREAT SERPL: 30.2 (ref 7–25)
CALCIUM SPEC-SCNC: 9.2 MG/DL (ref 8.6–10.5)
CHLORIDE SERPL-SCNC: 99 MMOL/L (ref 98–107)
CLARITY UR: CLEAR
CO2 SERPL-SCNC: 25.7 MMOL/L (ref 22–29)
COLOR UR: YELLOW
CREAT SERPL-MCNC: 0.53 MG/DL (ref 0.57–1)
DEPRECATED RDW RBC AUTO: 50 FL (ref 37–54)
EGFRCR SERPLBLD CKD-EPI 2021: 91.3 ML/MIN/1.73
EOSINOPHIL # BLD AUTO: 0 10*3/MM3 (ref 0–0.4)
EOSINOPHIL NFR BLD AUTO: 0 % (ref 0.3–6.2)
ERYTHROCYTE [DISTWIDTH] IN BLOOD BY AUTOMATED COUNT: 16.3 % (ref 12.3–15.4)
GLUCOSE SERPL-MCNC: 140 MG/DL (ref 65–99)
GLUCOSE UR STRIP-MCNC: NEGATIVE MG/DL
HCT VFR BLD AUTO: 27.8 % (ref 34–46.6)
HGB BLD-MCNC: 8.4 G/DL (ref 12–15.9)
HGB UR QL STRIP.AUTO: ABNORMAL
HYALINE CASTS UR QL AUTO: ABNORMAL /LPF
IMM GRANULOCYTES # BLD AUTO: 0.07 10*3/MM3 (ref 0–0.05)
IMM GRANULOCYTES NFR BLD AUTO: 0.4 % (ref 0–0.5)
KETONES UR QL STRIP: NEGATIVE
LEUKOCYTE ESTERASE UR QL STRIP.AUTO: NEGATIVE
LYMPHOCYTES # BLD AUTO: 0.69 10*3/MM3 (ref 0.7–3.1)
LYMPHOCYTES NFR BLD AUTO: 4.4 % (ref 19.6–45.3)
MCH RBC QN AUTO: 25.3 PG (ref 26.6–33)
MCHC RBC AUTO-ENTMCNC: 30.2 G/DL (ref 31.5–35.7)
MCV RBC AUTO: 83.7 FL (ref 79–97)
MONOCYTES # BLD AUTO: 1.2 10*3/MM3 (ref 0.1–0.9)
MONOCYTES NFR BLD AUTO: 7.6 % (ref 5–12)
NEUTROPHILS NFR BLD AUTO: 13.88 10*3/MM3 (ref 1.7–7)
NEUTROPHILS NFR BLD AUTO: 87.5 % (ref 42.7–76)
NITRITE UR QL STRIP: NEGATIVE
NRBC BLD AUTO-RTO: 0 /100 WBC (ref 0–0.2)
PH UR STRIP.AUTO: 7 [PH] (ref 5–8)
PLATELET # BLD AUTO: 343 10*3/MM3 (ref 140–450)
PMV BLD AUTO: 9.3 FL (ref 6–12)
POTASSIUM SERPL-SCNC: 3.4 MMOL/L (ref 3.5–5.2)
PROCALCITONIN SERPL-MCNC: 0.17 NG/ML (ref 0–0.25)
PROT UR QL STRIP: NEGATIVE
RBC # BLD AUTO: 3.32 10*6/MM3 (ref 3.77–5.28)
RBC # UR STRIP: ABNORMAL /HPF
REF LAB TEST METHOD: ABNORMAL
REF LAB TEST METHOD: NORMAL
SODIUM SERPL-SCNC: 136 MMOL/L (ref 136–145)
SP GR UR STRIP: 1.02 (ref 1–1.03)
SQUAMOUS #/AREA URNS HPF: ABNORMAL /HPF
UROBILINOGEN UR QL STRIP: ABNORMAL
WBC # UR STRIP: ABNORMAL /HPF
WBC NRBC COR # BLD AUTO: 15.86 10*3/MM3 (ref 3.4–10.8)

## 2024-02-22 PROCEDURE — 25010000002 HEPARIN (PORCINE) PER 1000 UNITS: Performed by: ORTHOPAEDIC SURGERY

## 2024-02-22 PROCEDURE — 25010000002 LORAZEPAM PER 2 MG: Performed by: FAMILY MEDICINE

## 2024-02-22 PROCEDURE — 97112 NEUROMUSCULAR REEDUCATION: CPT

## 2024-02-22 PROCEDURE — 85025 COMPLETE CBC W/AUTO DIFF WBC: CPT | Performed by: STUDENT IN AN ORGANIZED HEALTH CARE EDUCATION/TRAINING PROGRAM

## 2024-02-22 PROCEDURE — 25810000003 SODIUM CHLORIDE 0.9 % SOLUTION: Performed by: INTERNAL MEDICINE

## 2024-02-22 PROCEDURE — 80048 BASIC METABOLIC PNL TOTAL CA: CPT | Performed by: STUDENT IN AN ORGANIZED HEALTH CARE EDUCATION/TRAINING PROGRAM

## 2024-02-22 PROCEDURE — 97535 SELF CARE MNGMENT TRAINING: CPT

## 2024-02-22 PROCEDURE — 97530 THERAPEUTIC ACTIVITIES: CPT

## 2024-02-22 PROCEDURE — 94761 N-INVAS EAR/PLS OXIMETRY MLT: CPT

## 2024-02-22 PROCEDURE — 25010000002 CEFTRIAXONE SODIUM-DEXTROSE 1-3.74 GM-%(50ML) RECONSTITUTED SOLUTION: Performed by: STUDENT IN AN ORGANIZED HEALTH CARE EDUCATION/TRAINING PROGRAM

## 2024-02-22 PROCEDURE — 81001 URINALYSIS AUTO W/SCOPE: CPT | Performed by: STUDENT IN AN ORGANIZED HEALTH CARE EDUCATION/TRAINING PROGRAM

## 2024-02-22 PROCEDURE — 94664 DEMO&/EVAL PT USE INHALER: CPT

## 2024-02-22 PROCEDURE — 84145 PROCALCITONIN (PCT): CPT | Performed by: STUDENT IN AN ORGANIZED HEALTH CARE EDUCATION/TRAINING PROGRAM

## 2024-02-22 PROCEDURE — 94799 UNLISTED PULMONARY SVC/PX: CPT

## 2024-02-22 PROCEDURE — 99232 SBSQ HOSP IP/OBS MODERATE 35: CPT | Performed by: STUDENT IN AN ORGANIZED HEALTH CARE EDUCATION/TRAINING PROGRAM

## 2024-02-22 RX ORDER — ESCITALOPRAM OXALATE 10 MG/1
10 TABLET ORAL DAILY
Status: DISCONTINUED | OUTPATIENT
Start: 2024-02-22 | End: 2024-02-24 | Stop reason: HOSPADM

## 2024-02-22 RX ADMIN — LORAZEPAM 0.5 MG: 2 INJECTION INTRAMUSCULAR; INTRAVENOUS at 02:16

## 2024-02-22 RX ADMIN — Medication 10 ML: at 22:09

## 2024-02-22 RX ADMIN — DOCUSATE SODIUM 50MG AND SENNOSIDES 8.6MG 2 TABLET: 8.6; 5 TABLET, FILM COATED ORAL at 09:12

## 2024-02-22 RX ADMIN — LOSARTAN POTASSIUM 50 MG: 50 TABLET, FILM COATED ORAL at 09:12

## 2024-02-22 RX ADMIN — ESCITALOPRAM OXALATE 10 MG: 10 TABLET ORAL at 11:08

## 2024-02-22 RX ADMIN — Medication 10 ML: at 09:18

## 2024-02-22 RX ADMIN — BUDESONIDE 0.5 MG: 0.5 INHALANT RESPIRATORY (INHALATION) at 07:07

## 2024-02-22 RX ADMIN — Medication 10 ML: at 22:10

## 2024-02-22 RX ADMIN — CILOSTAZOL 50 MG: 100 TABLET ORAL at 22:09

## 2024-02-22 RX ADMIN — DOCUSATE SODIUM 50MG AND SENNOSIDES 8.6MG 2 TABLET: 8.6; 5 TABLET, FILM COATED ORAL at 22:06

## 2024-02-22 RX ADMIN — ATORVASTATIN CALCIUM 20 MG: 20 TABLET, FILM COATED ORAL at 22:05

## 2024-02-22 RX ADMIN — ASPIRIN 81 MG: 81 TABLET, COATED ORAL at 09:11

## 2024-02-22 RX ADMIN — HEPARIN SODIUM 5000 UNITS: 5000 INJECTION, SOLUTION INTRAVENOUS; SUBCUTANEOUS at 13:00

## 2024-02-22 RX ADMIN — SODIUM CHLORIDE 50 ML/HR: 9 INJECTION, SOLUTION INTRAVENOUS at 02:18

## 2024-02-22 RX ADMIN — CEFTRIAXONE 1000 MG: 1 INJECTION, SOLUTION INTRAVENOUS at 12:59

## 2024-02-22 RX ADMIN — ACETAMINOPHEN 650 MG: 325 TABLET, FILM COATED ORAL at 15:34

## 2024-02-22 RX ADMIN — FAMOTIDINE 40 MG: 20 TABLET, FILM COATED ORAL at 09:12

## 2024-02-22 RX ADMIN — Medication 1 PATCH: at 09:12

## 2024-02-22 RX ADMIN — HEPARIN SODIUM 5000 UNITS: 5000 INJECTION, SOLUTION INTRAVENOUS; SUBCUTANEOUS at 06:54

## 2024-02-22 RX ADMIN — HEPARIN SODIUM 5000 UNITS: 5000 INJECTION, SOLUTION INTRAVENOUS; SUBCUTANEOUS at 22:05

## 2024-02-22 RX ADMIN — CILOSTAZOL 50 MG: 100 TABLET ORAL at 09:11

## 2024-02-22 NOTE — PLAN OF CARE
Goal Outcome Evaluation:  Plan of Care Reviewed With: patient        Progress: declining  Outcome Evaluation: OT tx completed. Patient is supine in bed, nursing present at bedside for meds. Patient keeps eyes closed but awakens when name is called. Patient on 3L O2 satting 100%, c/o 7/10 L hip pain at rest. Patient moved to EOB with mod A, sitting EOB performed UBD and grooming with min A, LBD with max A. Patient performed sit to stand 2x's using RW with mod A x 2, required max A x 2 for tf to chair. Patient able to perform 2 additional sit to stands in shantel steady with mod A x 2 with improved posture noted and less fearful. Patient left reclined in chair with call bell within reach and chair alarm.      Anticipated Discharge Disposition (OT): home with home health, home with 24/7 care, home with assist, inpatient rehabilitation facility

## 2024-02-22 NOTE — THERAPY TREATMENT NOTE
Patient Name: Tracey Nelson  : 1940    MRN: [id number]                              Today's Date: 2024       Admit Date: 2024    Visit Dx:     ICD-10-CM ICD-9-CM   1. Closed fracture of left hip, initial encounter  S72.002A 820.8   2. Closed hip fracture  S72.009A 820.8   3. Abnormal CT of the chest  R93.89 793.2   4. Chronic cough  R05.3 786.2     Patient Active Problem List   Diagnosis    Abnormal CT of the chest    Chronic cough    Closed left hip fracture    Peripheral artery disease     Past Medical History:   Diagnosis Date    Allergies     Bronchitis     Cataract, bilateral     s/p surgery    Coronary artery disease     Hyperlipidemia     LBBB (left bundle branch block) 2018    Myocardial infarction     pt denies    PAD (peripheral artery disease)     stent in each leg    Pneumonia     Skin cancer     Wears dentures     upper plate and lower is a partial     Past Surgical History:   Procedure Laterality Date    CAROTID ENDARTERECTOMY Left     CATARACT EXTRACTION W/ INTRAOCULAR LENS  IMPLANT, BILATERAL      COLONOSCOPY      DILATION AND CURETTAGE, DIAGNOSTIC / THERAPEUTIC      HIP BIPOLAR REPLACEMENT Left 2024    Procedure: HIP BIPOLAR REPLACEMENT LEFT;  Surgeon: Cedrick Gomez MD;  Location: [facility];  Service: Orthopedics;  Laterality: Left;    OTHER SURGICAL HISTORY Bilateral     stent in each leg    SKIN BIOPSY      SKIN CANCER EXCISION      TONSILLECTOMY        General Information       Row Name 24 1511          OT Time and Intention    Document Type therapy note (daily note)  -SD     Mode of Treatment occupational therapy  -SD       Row Name 24 1511          General Information    Patient Profile Reviewed yes  -SD               User Key  (r) = Recorded By, (t) = Taken By, (c) = Cosigned By      Initials Name Provider Type    SD Cait Pacheco OT Occupational Therapist                     Mobility/ADL's       Row Name 24 1511          Bed Mobility     Bed Mobility supine-sit  -SD     Supine-Sit [place] (Bed Mobility) moderate assist (50% patient effort)  -SD     Assistive Device (Bed Mobility) bed rails;head of bed elevated  -SD       Row Name 02/22/24 1511          Transfers    Transfers sit-stand transfer;bed-chair transfer  -SD     Comment, (Transfers) STS x 2 using RW + STS x 2 in shantel steady  -SD       Row Name 02/22/24 1511          Bed-Chair Transfer    Bed-Chair [place] (Transfers) maximum assist (25% patient effort);2 person assist  -SD     Assistive Device (Bed-Chair Transfers) walker, front-wheeled  -SD       Row Name 02/22/24 1511          Sit-Stand Transfer    Sit-Stand [place] (Transfers) moderate assist (50% patient effort);2 person assist  -SD     Assistive Device (Sit-Stand Transfers) walker, front-wheeled  -SD       Row Name 02/22/24 1511          Upper Body Dressing Assessment/Training    [place] Level (Upper Body Dressing) don;minimum assist (75% patient effort)  -SD     Position (Upper Body Dressing) edge of bed sitting  -SD       Row Name 02/22/24 1511          Lower Body Dressing Assessment/Training    [place] Level (Lower Body Dressing) don;socks;maximum assist (25% patient effort)  -SD     Position (Lower Body Dressing) edge of bed sitting  -SD       Row Name 02/22/24 1511          Grooming Assessment/Training    [place] Level (Grooming) hair care, combing/brushing;minimum assist (75% patient effort)  -SD     Position (Grooming) edge of bed sitting  -SD               User Key  (r) = Recorded By, (t) = Taken By, (c) = Cosigned By      Initials Name Provider Type    Cait Sauceda OT Occupational Therapist                   Obj/Interventions    No documentation.                  Goals/Plan    No documentation.                  Clinical Impression       Row Name 02/22/24 1513          Pain Assessment    Pretreatment Pain Rating 7/10  -SD     Posttreatment Pain Rating 7/10  -SD     Pain Location -  Side/Orientation Left  -SD     Pain Location - hip  -SD     Pain Intervention(s) Repositioned;Ambulation/increased activity  -SD       Row Name 02/22/24 1513          Plan of Care Review    Plan of Care Reviewed With patient  -SD     Progress declining  -SD     Outcome Evaluation OT tx completed. Patient is supine in bed, nursing present at bedside for meds. Patient keeps eyes closed but awakens when name is called. Patient on 3L O2 satting 100%, c/o 7/10 L hip pain at rest. Patient moved to EOB with mod A, sitting EOB performed UBD and grooming with min A, LBD with max A. Patient performed sit to stand 2x's using RW with mod A x 2, required max A x 2 for tf to chair. Patient able to perform 2 additional sit to stands in shantel steady with mod A x 2 with improved posture noted and less fearful. Patient left reclined in chair with call bell within reach and chair alarm.  -SD       Row Name 02/22/24 1513          Vital Signs    Pre SpO2 (%) 100  -SD     O2 Delivery Pre Treatment supplemental O2  -SD     O2 Delivery Intra Treatment supplemental O2  -SD     O2 Delivery Post Treatment supplemental O2  -SD       Row Name 02/22/24 1513          Positioning and Restraints    Pre-Treatment Position in bed  -SD     Post Treatment Position chair  -SD     In Chair reclined;call light within reach;encouraged to call for assist;exit alarm on  -SD               User Key  (r) = Recorded By, (t) = Taken By, (c) = Cosigned By      Initials Name Provider Type    Cait Sauceda OT Occupational Therapist                   Outcome Measures       Row Name 02/22/24 1516          How much help from another is currently needed...    Putting on and taking off regular lower body clothing? 2  -SD     Bathing (including washing, rinsing, and drying) 2  -SD     Toileting (which includes using toilet bed pan or urinal) 2  -SD     Putting on and taking off regular upper body clothing 3  -SD     Taking care of personal grooming (such as brushing  teeth) 3  -SD     Eating meals 3  -SD     AM-PAC 6 Clicks Score (OT) 15  -SD       Row Name 02/22/24 0500          How much help from another person do you currently need...    Turning from your back to your side while in flat bed without using bedrails? 3  -CG     Moving from lying on back to sitting on the side of a flat bed without bedrails? 3  -CG     Moving to and from a bed to a chair (including a wheelchair)? 1  -CG     Standing up from a chair using your arms (e.g., wheelchair, bedside chair)? 1  -CG     Climbing 3-5 steps with a railing? 1  -CG     To walk in hospital room? 1  -CG     AM-PAC 6 Clicks Score (PT) 10  -CG     Highest Level of Mobility Goal 4 --> Transfer to chair/commode  -CG       Row Name 02/22/24 1516          Functional Assessment    Outcome Measure Options AM-PAC 6 Clicks Daily Activity (OT)  -SD               User Key  (r) = Recorded By, (t) = Taken By, (c) = Cosigned By      Initials Name Provider Type    CG Dajuan Rodriguez, RN Registered Nurse    Cait Sauceda, GAVI Occupational Therapist                    Occupational Therapy Education       Title: PT OT SLP Therapies (In Progress)       Topic: Occupational Therapy (In Progress)       Point: ADL training (Done)       Description:   Instruct learner(s) on proper safety adaptation and remediation techniques during self care or transfers.   Instruct in proper use of assistive devices.                  Learning Progress Summary             Patient Acceptance, E,TB, VU by SD at 2/22/2024 1517    Comment: Benefit of OOB    Acceptance, E,TB, VU by SD at 2/21/2024 1339    Comment: OT POC                         Point: Home exercise program (Not Started)       Description:   Instruct learner(s) on appropriate technique for monitoring, assisting and/or progressing therapeutic exercises/activities.                  Learner Progress:  Not documented in this visit.              Point: Precautions (Not Started)       Description:   Instruct  learner(s) on prescribed precautions during self-care and functional transfers.                  Learner Progress:  Not documented in this visit.              Point: Body mechanics (Not Started)       Description:   Instruct learner(s) on proper positioning and spine alignment during self-care, functional mobility activities and/or exercises.                  Learner Progress:  Not documented in this visit.                              User Key       Initials Effective Dates Name Provider Type Discipline    SD 06/16/21 -  Cait Pacheco OT Occupational Therapist OT                  OT Recommendation and Plan  Planned Therapy Interventions (OT): activity tolerance training, adaptive equipment training, BADL retraining, patient/caregiver education/training, strengthening exercise, transfer/mobility retraining, ROM/therapeutic exercise  Therapy Frequency (OT): 3 times/wk (5 times if indicated)  Plan of Care Review  Plan of Care Reviewed With: patient  Progress: declining  Outcome Evaluation: OT tx completed. Patient is supine in bed, nursing present at bedside for meds. Patient keeps eyes closed but awakens when name is called. Patient on 3L O2 satting 100%, c/o 7/10 L hip pain at rest. Patient moved to EOB with mod A, sitting EOB performed UBD and grooming with min A, LBD with max A. Patient performed sit to stand 2x's using RW with mod A x 2, required max A x 2 for tf to chair. Patient able to perform 2 additional sit to stands in shantel steady with mod A x 2 with improved posture noted and less fearful. Patient left reclined in chair with call bell within reach and chair alarm.     Time Calculation:   Evaluation Complexity (OT)  Review Occupational Profile/Medical/Therapy History Complexity: expanded/moderate complexity  Assessment, Occupational Performance/Identification of Deficit Complexity: 3-5 performance deficits  Clinical Decision Making Complexity (OT): detailed assessment/moderate complexity  Overall  Complexity of Evaluation (OT): moderate complexity     Time Calculation- OT       Row Name 02/22/24 1517             Time Calculation- OT    OT Start Time 1106  -SD      OT Stop Time 1140  -SD      OT Time Calculation (min) 34 min  -SD      OT Received On 02/22/24  -SD      OT Goal Re-Cert Due Date 03/04/24  -SD         Timed Charges    97695 - OT Therapeutic Activity Minutes 19  -SD      42958 - OT Self Care/Mgmt Minutes 15  -SD         Total Minutes    Timed Charges Total Minutes 34  -SD       Total Minutes 34  -SD                User Key  (r) = Recorded By, (t) = Taken By, (c) = Cosigned By      Initials Name Provider Type    SD Cait Pacheco, OT Occupational Therapist                  Therapy Charges for Today       Code Description Service Date Service Provider Modifiers Qty    26248120980  OT EVAL MOD COMPLEXITY 3 2/21/2024 Cait Pacheco OT GO 1    78136711734  OT THERAPEUTIC ACT EA 15 MIN 2/22/2024 Cait Pacheco OT GO 1    93487719491 HC OT SELF CARE/MGMT/TRAIN EA 15 MIN 2/22/2024 Cait Pacheco OT GO 1                 Cait Pacheco OT  2/22/2024

## 2024-02-22 NOTE — CASE MANAGEMENT/SOCIAL WORK
Continued Stay Note  TROY Delaney     Patient Name: Tracey Nelson  MRN: [id number]  Today's Date: 2/22/2024    Admit Date: 2/19/2024    Plan: spoke with [facility] stated [facility] can accept   Discharge Plan       Row Name 02/22/24 1016       Plan    Plan spoke with [facility] stated they can accept                      Mabel Espinoza RN

## 2024-02-22 NOTE — PLAN OF CARE
Goal Outcome Evaluation:      Pt has been confused all shift, but relatively calm with medications for agitation.  Pt refused interventions and becomes agitated when disturbed.  Refuses tylenol for pain.                                        Problem: Pain Acute  Goal: Acceptable Pain Control and Functional Ability  Outcome: Unable to Meet, Plan Revised

## 2024-02-22 NOTE — PROGRESS NOTES
"Dietitian Follow-up    Patient Name: Tracey Nelson  YOB: 1940  MRN: [id number]  Admission date: 2/19/2024    Comment:    Pt with little PO intake but is drinking ONS. Will add mighty shake in addition to ONS order.    Clinical Nutrition Follow-up   Encounter Information        Trending Narrative     2/22: Pt remains in ICU. Family is at bedside. Family reported pt is very active at home and she eats smaller food portions. Observed untouched lunch tray. Pt is drinking ONS. Pt meets malnutrition criteria based on NFPE. See MSA below.    2/21: Pt is in the ICU s/p L hip hemiarthroplasty. Most EMR wts are less than 100#. Will order Boost VHC d/t pt is underweight r/t BMI for age.      Anthropometrics        Current Height, Weight Height: 165.1 cm (65\")  Weight: 45.5 kg (100 lb 5 oz) (02/22/24 0400)       Trending Weight Hx     This admission:              PTA:     Wt Readings from Last 30 Encounters:   02/22/24 0400 45.5 kg (100 lb 5 oz)   02/21/24 0400 44.9 kg (98 lb 15.8 oz)   02/20/24 0944 60.2 kg (132 lb 11.5 oz)   02/20/24 0316 60.2 kg (132 lb 11.5 oz)   02/19/24 1734 60.5 kg (133 lb 6.1 oz)   02/19/24 1337 42.6 kg (94 lb)   12/24/23 2153 47.2 kg (104 lb)   08/30/23 1021 41.5 kg (91 lb 9.6 oz)   11/27/22 1129 42.2 kg (93 lb)   10/16/22 0225 43.1 kg (95 lb)   09/02/22 1308 41.3 kg (91 lb)   06/30/22 1321 41.7 kg (92 lb)   09/01/21 1533 42.2 kg (93 lb)   03/27/17 1329 47.6 kg (105 lb)   01/14/14 1429 48.3 kg (106 lb 8.1 oz)      BMI kg/m2 Body mass index is 16.69 kg/m².     Labs        Pertinent Labs Results from last 7 days   Lab Units 02/22/24  0421 02/21/24  0418 02/20/24  0523   SODIUM mmol/L 136 133* 133*   POTASSIUM mmol/L 3.4* 4.0 3.8   CHLORIDE mmol/L 99 98 98   CO2 mmol/L 25.7 21.4* 24.0   BUN mg/dL 16 17 9   CREATININE mg/dL 0.53* 0.66 0.50*   CALCIUM mg/dL 9.2 9.8 9.5   GLUCOSE mg/dL 140* 262* 115*     Results from last 7 days   Lab Units 02/22/24  0421   HEMOGLOBIN g/dL 8.4*   HEMATOCRIT " % 27.8*         Medications    Scheduled Medications aspirin, 81 mg, Oral, Daily  atorvastatin, 20 mg, Oral, Nightly  budesonide, 0.5 mg, Nebulization, BID - RT  cefTRIAXone, 1,000 mg, Intravenous, Q24H  cilostazol, 50 mg, Oral, BID  escitalopram, 10 mg, Oral, Daily  ethyl alcohol, 2 Swab, Nasal, Once  famotidine, 40 mg, Oral, Daily  heparin (porcine), 5,000 Units, Subcutaneous, Q8H  losartan, 50 mg, Oral, Daily  nicotine, 1 patch, Transdermal, Q24H  senna-docusate sodium, 2 tablet, Oral, BID  sodium chloride, 10 mL, Intravenous, Q12H  sodium chloride, 10 mL, Intravenous, Q12H  sodium chloride, 10 mL, Intravenous, Q12H        Infusions sodium chloride, 50 mL/hr, Last Rate: 50 mL/hr (02/22/24 0218)        PRN Medications   acetaminophen **OR** acetaminophen **OR** acetaminophen    senna-docusate sodium **AND** polyethylene glycol **AND** bisacodyl **AND** bisacodyl    busPIRone    haloperidol lactate    hydrALAZINE    ipratropium-albuterol    LORazepam    Morphine **AND** naloxone    Morphine **AND** naloxone    ondansetron    ondansetron ODT    oxyCODONE-acetaminophen    [COMPLETED] Insert Peripheral IV **AND** sodium chloride    sodium chloride    sodium chloride    sodium chloride    sodium chloride    sodium chloride     Physical Findings        Trending Physical   Appearance, NFPE    --  Edema  1+trace edema in legs     Bowel Function No BM documented at this time     Tubes Peripheral IV     Chewing/Swallowing WNL     Skin Stage 1 PI coccyx       Malnutrition Severity Assessment      Patient meets criteria for : Severe Malnutrition  Malnutrition Type (last 8 hours)       Malnutrition Severity Assessment       Row Name 02/22/24 1258       Malnutrition Severity Assessment    Malnutrition Type Chronic Disease - Related Malnutrition      Row Name 02/22/24 1258       Muscle Loss    Loss of Muscle Mass Findings Severe    Vincent Region Severe - deep hollowing/scooping, lack of muscle to touch, facial bones well defined     Clavicle Bone Region Severe - protruding prominent bone    Acromion Bone Region Severe - squared shoulders, bones, and acromion process protrusion prominent    Dorsal Hand Region Severe - prominent depression      Row Name 02/22/24 1258       Fat Loss    Subcutaneous Fat Loss Findings Severe    Orbital Region  Severe - pronounced hollowness/depression, dark circles, loose saggy skin    Upper Arm Region Severe - mostly skin, very little space between folds, fingers touch      Row Name 02/22/24 1258       Criteria Met (Must meet criteria for severity in at least 2 of these categories: M Wasting, Fat Loss, Fluid, Secondary Signs, Wt. Status, Intake)    Patient meets criteria for  Severe Malnutrition                       Current Nutrition Orders & Evaluation of Intake       Oral Nutrition     Food Allergies    Current PO Diet Diet: Regular/House Diet; Texture: Regular Texture (IDDSI 7); Fluid Consistency: Thin (IDDSI 0)   Supplement Boost VHC daily   PO Evaluation     Trending % PO Intake 12% x 4 meals     Nutrition Diagnosis         Nutrition Dx Problem 1 Underweight r/t COPD AEB BMI=16.47.       Nutrition Dx Problem 2 Chronic disease related malnutrition r/t COPD AEB severe muscle wasting (temple, clavicle, acromion, dorsal had) and severe subcutaneous fat loss (orbital, upper arm).         Intervention Goal         Intervention Goal(s) Maintain CBW/Promote wt gain  PO intake meet >50% of estimated needs  Adhere to ONS     Nutrition Intervention        RD Action Will order Mighty shake in addition to ONS order     Nutrition Prescription          Diet Prescription Regular   Supplement Prescription Boost VHC daily, Mighty shake daily     Monitor/Evaluation        Monitor Per protocol, I&O, PO intake, Supplement intake, Pertinent labs, Weight, Skin status, GI status, Symptoms, Swallow function     RD to f/up    Electronically signed by:  Ladan Gomez RD  02/22/24 12:56 EST

## 2024-02-22 NOTE — PLAN OF CARE
Goal Outcome Evaluation:  Plan of Care Reviewed With: patient        Progress: no change  Outcome Evaluation: Pt sitting EOB upon PTA entering room. Pt willing to continue with treatment. Pt performed sit to stand x 2 with rw and x 2 with shantel steady both with max a of 2 required to come to full stand.  Pt with significant posterior lean in both sitting and standing.  Pt requires max a of 2 to perform bed to chair stand step transfer with rw.  See flowsheet for details. Cont PT per POC progressing to goals as pt tolerates.

## 2024-02-22 NOTE — THERAPY TREATMENT NOTE
Patient Name: Tracey Nelson  : 1940    MRN: [id number]                              Today's Date: 2024       Admit Date: 2024    Visit Dx:     ICD-10-CM ICD-9-CM   1. Closed fracture of left hip, initial encounter  S72.002A 820.8   2. Closed hip fracture  S72.009A 820.8   3. Abnormal CT of the chest  R93.89 793.2   4. Chronic cough  R05.3 786.2     Patient Active Problem List   Diagnosis    Abnormal CT of the chest    Chronic cough    Closed left hip fracture    Peripheral artery disease     Past Medical History:   Diagnosis Date    Allergies     Bronchitis     Cataract, bilateral     s/p surgery    Coronary artery disease     Hyperlipidemia     LBBB (left bundle branch block) 2018    Myocardial infarction     pt denies    PAD (peripheral artery disease)     stent in each leg    Pneumonia     Skin cancer     Wears dentures     upper plate and lower is a partial     Past Surgical History:   Procedure Laterality Date    CAROTID ENDARTERECTOMY Left     CATARACT EXTRACTION W/ INTRAOCULAR LENS  IMPLANT, BILATERAL      COLONOSCOPY      DILATION AND CURETTAGE, DIAGNOSTIC / THERAPEUTIC      HIP BIPOLAR REPLACEMENT Left 2024    Procedure: HIP BIPOLAR REPLACEMENT LEFT;  Surgeon: Cedrick Gomez MD;  Location: [facility];  Service: Orthopedics;  Laterality: Left;    OTHER SURGICAL HISTORY Bilateral     stent in each leg    SKIN BIOPSY      SKIN CANCER EXCISION      TONSILLECTOMY        General Information       Row Name 24 1506          Physical Therapy Time and Intention    Document Type therapy note (daily note)  -RM     Mode of Treatment physical therapy  -RM       Row Name 24 1506          General Information    Existing Precautions/Restrictions fall;oxygen therapy device and L/min;hip, posterior  -RM       Row Name 24 1506          Cognition    Orientation Status (Cognition) oriented to;person  -RM       Row Name 24 1506          Safety Issues, Functional Mobility     Safety Issues Affecting Function (Mobility) safety precautions follow-through/compliance;positioning of assistive device;sequencing abilities;problem-solving;safety precaution awareness;insight into deficits/self-awareness;judgment  -RM     Impairments Affecting Function (Mobility) balance;cognition;coordination;endurance/activity tolerance;motor control;motor planning;pain;postural/trunk control;shortness of breath;strength  -RM               User Key  (r) = Recorded By, (t) = Taken By, (c) = Cosigned By      Initials Name Provider Type    Rodrigo Rodriguez, COLTEN Physical Therapist Assistant                   Mobility       Row Name 02/22/24 1514          Bed Mobility    Supine-Sit [place] (Bed Mobility) moderate assist (50% patient effort)  -RM     Assistive Device (Bed Mobility) bed rails;head of bed elevated  -RM       Row Name 02/22/24 1514          Transfers    Comment, (Transfers) STS x 2 with rw and  STS x 2 with shantel steady  -RM       Row Name 02/22/24 1514          Bed-Chair Transfer    Bed-Chair [place] (Transfers) maximum assist (25% patient effort);2 person assist  -RM     Assistive Device (Bed-Chair Transfers) walker, front-wheeled  -RM       Row Name 02/22/24 1514          Sit-Stand Transfer    Sit-Stand [place] (Transfers) moderate assist (50% patient effort);2 person assist  -RM     Assistive Device (Sit-Stand Transfers) walker, front-wheeled  -RM     Comment, (Sit-Stand Transfer) shantel steady  -RM       Row Name 02/22/24 1514          Gait/Stairs (Locomotion)    Patient was able to Ambulate no, other medical factors prevent ambulation  -RM               User Key  (r) = Recorded By, (t) = Taken By, (c) = Cosigned By      Initials Name Provider Type    Rodrigo Rodriguez, COLTEN Physical Therapist Assistant                   Obj/Interventions    No documentation.                  Goals/Plan    No documentation.                  Clinical Impression       Row Name 02/22/24 1516           Pain    Pretreatment Pain Rating 5/10  -RM     Posttreatment Pain Rating 5/10  -RM     Pain Location - Side/Orientation Left  -RM     Pain Location - hip  -RM       Row Name 02/22/24 1516          Plan of Care Review    Plan of Care Reviewed With patient  -RM     Progress no change  -RM     Outcome Evaluation Pt sitting EOB upon PTA entering room. Pt willing to continue with treatment. Pt performed sit to stand x 2 with rw and x 2 with shantel steady both with max a of 2 required to come to full stand.  Pt with significant posterior lean in both sitting and standing.  Pt requires max a of 2 to perform bed to chair stand step transfer with rw.  See flowsheet for details. Cont PT per POC progressing to goals as pt tolerates.  -RM       Row Name 02/22/24 1516          Vital Signs    O2 Delivery Pre Treatment supplemental O2  -RM     O2 Delivery Post Treatment supplemental O2  -RM     Pre Patient Position Supine  -RM     Intra Patient Position Standing  -RM     Post Patient Position Sitting  -RM       Row Name 02/22/24 1516          Positioning and Restraints    Pre-Treatment Position in bed  -RM     Post Treatment Position chair  -RM     In Chair reclined;call light within reach;encouraged to call for assist;exit alarm on;notified nsg  -RM               User Key  (r) = Recorded By, (t) = Taken By, (c) = Cosigned By      Initials Name Provider Type    RM Rodrigo Garcia, PTA Physical Therapist Assistant                   Outcome Measures       Row Name 02/22/24 1523 02/22/24 0500       How much help from another person do you currently need...    Turning from your back to your side while in flat bed without using bedrails? 3  -RM 3  -CG    Moving from lying on back to sitting on the side of a flat bed without bedrails? 2  -RM 3  -CG    Moving to and from a bed to a chair (including a wheelchair)? 1  -RM 1  -CG    Standing up from a chair using your arms (e.g., wheelchair, bedside chair)? 2  -RM 1  -CG    Climbing 3-5  steps with a railing? 1  -RM 1  -CG    To walk in hospital room? 1  -RM 1  -CG    AM-PAC 6 Clicks Score (PT) 10  -RM 10  -CG    Highest Level of Mobility Goal 4 --> Transfer to chair/commode  -RM 4 --> Transfer to chair/commode  -CG      Row Name 02/22/24 1523 02/22/24 1516       Functional Assessment    Outcome Measure Options AM-PAC 6 Clicks Basic Mobility (PT)  -RM AM-PAC 6 Clicks Daily Activity (OT)  -SD              User Key  (r) = Recorded By, (t) = Taken By, (c) = Cosigned By      Initials Name Provider Type    CG Dajuan Rodriguez, RN Registered Nurse     Rodrigo Garcia, PTA Physical Therapist Assistant    Cait Sauceda OT Occupational Therapist                                 Physical Therapy Education       Title: PT OT SLP Therapies (In Progress)       Topic: Physical Therapy (Done)       Point: Mobility training (Done)       Learning Progress Summary             Patient Acceptance, E,TB,D, VU,NR by  at 2/22/2024 1524    Comment: sequencing for mobility   safety with mobility    Eager, E, VU,NR by  at 2/21/2024 1205   Family Eager, E, VU,NR by  at 2/21/2024 1205                         Point: Home exercise program (Done)       Learning Progress Summary             Patient Eager, E, VU,NR by  at 2/21/2024 1205   Family Eager, E, VU,NR by  at 2/21/2024 1205                         Point: Body mechanics (Done)       Learning Progress Summary             Patient Eager, E, VU,NR by  at 2/21/2024 1205   Family Eager, E, VU,NR by  at 2/21/2024 1205                         Point: Precautions (Done)       Learning Progress Summary             Patient Eager, E, VU,NR by  at 2/21/2024 1205   Family Eager, E, VU,NR by  at 2/21/2024 1205                                         User Key       Initials Effective Dates Name Provider Type Discipline     06/16/21 -  Rodrigo Garcia, PTA Physical Therapist Assistant PT     01/18/24 -  Magali Nieves, KEELY Student PT Student PT                   PT Recommendation and Plan     Plan of Care Reviewed With: patient  Progress: no change  Outcome Evaluation: Pt sitting EOB upon PTA entering room. Pt willing to continue with treatment. Pt performed sit to stand x 2 with rw and x 2 with shantel steady both with max a of 2 required to come to full stand.  Pt with significant posterior lean in both sitting and standing.  Pt requires max a of 2 to perform bed to chair stand step transfer with rw.  See flowsheet for details. Cont PT per POC progressing to goals as pt tolerates.     Time Calculation:         PT Charges       Row Name 02/22/24 1529             Time Calculation    Start Time 1113  -RM      Stop Time 1142  -RM      Time Calculation (min) 29 min  -RM      PT Received On 02/22/24  -RM      PT Goal Re-Cert Due Date 03/02/24  -RM         Time Calculation- PT    Total Timed Code Minutes- PT 29 minute(s)  -RM         Timed Charges    85393 -  PT Neuromuscular Reeducation Minutes 14  -RM      82964 - PT Therapeutic Activity Minutes 15  -RM         Total Minutes    Timed Charges Total Minutes 29  -RM       Total Minutes 29  -RM                User Key  (r) = Recorded By, (t) = Taken By, (c) = Cosigned By      Initials Name Provider Type     Rodrigo Garcia, PTA Physical Therapist Assistant                  Therapy Charges for Today       Code Description Service Date Service Provider Modifiers Qty    06872518219 HC PT NEUROMUSC RE EDUCATION EA 15 MIN 2/22/2024 Rodrigo Garcia, PTA GP 1    52700386681 HC PT THERAPEUTIC ACT EA 15 MIN 2/22/2024 Rodrigo Garcia, PTA GP 1            PT G-Codes  Outcome Measure Options: AM-PAC 6 Clicks Basic Mobility (PT)  AM-PAC 6 Clicks Score (PT): 10  AM-PAC 6 Clicks Score (OT): 15       Rodrigo Garcia PTA  2/22/2024

## 2024-02-22 NOTE — PROGRESS NOTES
[facility]    Name:  Tracey Nelson   Age:  83 y.o.  Sex:  female  :  1940  MRN:  [id number]   Visit Number:  [id number]  Admission Date:  2024  Date Of Service:  24  Primary Care Physician:  Leola Wright MD     LOS: 3 days :    Chief Complaint:      Follow-up; hip pain    Subjective:    Feels a lot better this morning, does remember getting surgery.  Still having some pain in her hip but it is manageable.  Rested well overnight.    Hospital Course:    Tracey Nelson is an 83-year-old female with history of coronary artery disease, left bundle branch block, hypertension, peripheral artery disease was brought to the emergency room by her friend after she sustained a fall and developed left hip pain.  Patient was with her friend who brought her to the emergency room.  Patient apparently was going to get an echocardiogram done at [place] and her friend was driving but this happened while she was getting into the car.  Patient apparently hit her left forehead as well.  Patient states that she does have peripheral arterial disease and takes aspirin and has had stents in her legs.  She denies any prior history of coronary artery disease or coronary stents.  At time of admission only reporting concern of mild headache and left hip pain.     In the emergency room, she was afebrile and hemodynamically stable except for elevated blood pressure of 164/58.  Initial pulse oxygen saturation was 94% on room air but she subsequently dropped to 87% and had to be placed on 2 L of nasal cannula oxygen.  Blood work including CMP and CBC was unremarkable except for a sodium of 132 and a hemoglobin of 9.2 (baseline 11).  INR 1.19.  CT of the head and CT of the cervical spine were unremarkable.  Chest x-ray showed abnormal retrocardiac density left lung base which she has been following up with Dr. Ramirez.  X-ray of the hip with pelvis showed left subcapital  femoral neck fracture.  Patient's condition was discussed with Dr. Gomez and the patient was subsequently admitted to the medical floor for treatment of left hip fracture.    Review of Systems:     All systems were reviewed and negative except as mentioned in subjective, assessment and plan.    Vital Signs:    Temp:  [97.3 °F (36.3 °C)-98.5 °F (36.9 °C)] 98.1 °F (36.7 °C)  Heart Rate:  [63-98] 89  Resp:  [16-22] 17  BP: (132-167)/(46-77) 161/63    Intake and output:    I/O last 3 completed shifts:  In: 2130 [P.O.:920; I.V.:1210]  Out: 1725 [Urine:1725]  I/O this shift:  In: 360 [P.O.:360]  Out: 120 [Urine:120]    Physical Examination:    General Appearance:  Alert and appropriately cooperative.   Head:  Atraumatic and normocephalic.   Eyes: Conjunctivae and sclerae normal, no icterus. No pallor.   Throat: No oral lesions, no thrush, oral mucosa moist.   Neck: Supple, trachea midline, no thyromegaly.   Lungs:   Breath sounds heard bilaterally equally.  No wheezing or crackles. No Pleural rub or bronchial breathing.   Heart:  Normal S1 and S2, no murmur, no gallop, no rub. No JVD.   Abdomen:   Normal bowel sounds, no masses, no organomegaly. Soft, nontender, nondistended, no rebound tenderness.   Extremities: Supple, no edema, no cyanosis, no clubbing.   Skin: Warm.   Neurologic: Appropriately conversational.  No facial asymmetry. Moves all four limbs. No tremors.      Laboratory results:    Results from last 7 days   Lab Units 02/22/24  0421 02/21/24  0418 02/20/24  0523   SODIUM mmol/L 136 133* 133*   POTASSIUM mmol/L 3.4* 4.0 3.8   CHLORIDE mmol/L 99 98 98   CO2 mmol/L 25.7 21.4* 24.0   BUN mg/dL 16 17 9   CREATININE mg/dL 0.53* 0.66 0.50*   CALCIUM mg/dL 9.2 9.8 9.5   GLUCOSE mg/dL 140* 262* 115*     Results from last 7 days   Lab Units 02/22/24  0421 02/21/24  0418 02/20/24  0523   WBC 10*3/mm3 15.86* 12.08* 8.67   HEMOGLOBIN g/dL 8.4* 11.0* 9.8*   HEMATOCRIT % 27.8* 36.8 32.9*   PLATELETS 10*3/mm3 692 699 168  "    Results from last 7 days   Lab Units 02/19/24  1546   INR  1.19*     Results from last 7 days   Lab Units 02/20/24  0707 02/20/24  0523   HSTROP T ng/L 18* 19*     Results from last 7 days   Lab Units 02/21/24  1252 02/21/24  1247   BLOODCX  No growth at 24 hours No growth at 24 hours     No results for input(s): \"PHART\", \"WNY5PPY\", \"PO2ART\", \"SGO3CHN\", \"BASEEXCESS\" in the last 8760 hours.   I have reviewed the patient's laboratory results.    Radiology results:    XR Hip 1 View Without Pelvis Left (Surgery Only)    Result Date: 2/20/2024  PROCEDURE: XR HIP 1 VIEW WO PELVIS LEFT-  1 VIEW  HISTORY: left hip replacement; S72.002A-Fracture of unspecified part of neck of left femur, initial encounter for closed fracture; S72.009A-Fracture of unspecified part of neck of unspecified femur, initial encounter for closed fracture  COMPARISON: Preoperative exam 02/19/2024.  FINDINGS:  One view shows intraoperative view acquired during left hip arthroplasty procedure. Femoral prosthesis is noted. There has been resection of the left femoral head. Right hip joint is obscured.      Impression: Limited intraoperative imaging acquired during left hip arthroplasty procedure.     This report was signed and finalized on 2/20/2024 8:46 PM by Ahsan Hui MD.      XR Chest 1 View    Result Date: 2/20/2024  FINAL REPORT TECHNIQUE: An AP portable view of the chest was obtained. CLINICAL HISTORY: desating rapidly FINDINGS: There is moderate cardiomegaly.  There is left lower lobe consolidation and a small left pleural effusion.  There is no pneumothorax.. There is no adenopathy or significant osseous abnormality.     Impression: Left lower lobe consolidation worrisome for pneumonia.  Small left pleural effusion. Authenticated and Electronically Signed by Robby Lancaster M.D. on 02/20/2024 07:17:34 PM    XR Hip With or Without Pelvis 2 - 3 View Left    Result Date: 2/20/2024  FINAL REPORT TECHNIQUE: A single view of the left hip was " obtained. CLINICAL HISTORY: s/p L hip sarina FINDINGS: There are postoperative changes of left hip sarina-arthroplasty. Orthopedic hardware is intact and normally articulated.  There is no fracture.  There are vascular calcifications.  The right hip is unremarkable.     Impression: Vascular calcifications and expected postoperative findings. Authenticated and Electronically Signed by Robby Lancaster M.D. on 02/20/2024 07:17:03 PM    CT Angiogram Chest Pulmonary Embolism    Result Date: 2/20/2024  FINAL REPORT TECHNIQUE: Thin section axial images were obtained through the chest and during the arterial phase of IV contrast administration. Coronal 3-D MIP reconstructed images were also provided. CLINICAL HISTORY: Pulmonary embolism (PE) suspected, neg D-dimer soa, ams, s/p hip replacement in recovery FINDINGS: The pulmonary arteries are well-opacified and there is no filling defect to indicate pulmonary embolism. The thoracic aorta is normal.  There is a small to moderate pericardial effusion.  There is dense left lower lobe consolidation consistent with pneumonia.  There is a small left pleural effusion and mild right lung atelectasis.  There is mild emphysema.  There is no adenopathy or significant osseous abnormality.     Impression: No pulmonary embolism. Left lower lobe pneumonia with small left pleural effusion. Small to moderate pericardial effusion. Authenticated and Electronically Signed by Robby Lancaster M.D. on 02/20/2024 07:13:07 PM   I have reviewed the patient's radiology reports.    Medication Review:     I have reviewed the patient's active and prn medications.     Problem List:      Closed left hip fracture    Peripheral artery disease      Assessment:     Status post mechanical fall and left closed hip fracture, POA.  Peripheral artery disease with previous peripheral stents.  Coronary artery disease on medical therapy.  Essential hypertension.  COPD, no exacerbation.     Plan:     Comfortable in bed, pleasantly  conversational.  Delirium appears to have resolved.    Sister and Son updated during course.    Delirium, resolved  Urinary retention  Resolved as of morning 2/22.  Overnight did have over 900 mL urinary retention, this may have exacerbated her delirium.  Continue to monitor in the ICU.  Fall precautions.  Haldol as needed.  Do not suspect stroke. Family denies history of dementia.    Left lower lobe pneumonia  Rocephin.  May have aspiration.    Left hip fracture.  - Continue morphine, Lortab and Zofran for symptomatic control.  - s/p ORIF 2/20  - Cardiology assessed moderate risk, no contraindications to surgery.     PAD/CAD  - Continue home medications including aspirin, simvastatin.     COPD.  - No evidence of exacerbation.  - Continue bronchodilators as needed.     Risk Assessment: Moderate  DVT Prophylaxis: Lovenox  Code Status: Full  Diet: Cardiac  Discharge Plan: At least 1-2 more days, pending PT/OT    Brian Joseph Kerley, DO  02/22/24  15:19 EST    Dictated utilizing Dragon dictation.

## 2024-02-23 ENCOUNTER — DOCUMENTATION (OUTPATIENT)
Dept: HOME HEALTH SERVICES | Facility: HOME HEALTHCARE | Age: 84
End: 2024-02-23
Payer: MEDICARE

## 2024-02-23 PROBLEM — E43 SEVERE MALNUTRITION: Status: ACTIVE | Noted: 2024-02-23

## 2024-02-23 LAB
ANION GAP SERPL CALCULATED.3IONS-SCNC: 9.4 MMOL/L (ref 5–15)
BASOPHILS # BLD AUTO: 0.01 10*3/MM3 (ref 0–0.2)
BASOPHILS NFR BLD AUTO: 0.1 % (ref 0–1.5)
BUN SERPL-MCNC: 19 MG/DL (ref 8–23)
BUN/CREAT SERPL: 40.4 (ref 7–25)
CALCIUM SPEC-SCNC: 9.1 MG/DL (ref 8.6–10.5)
CHLORIDE SERPL-SCNC: 103 MMOL/L (ref 98–107)
CO2 SERPL-SCNC: 24.6 MMOL/L (ref 22–29)
CREAT SERPL-MCNC: 0.47 MG/DL (ref 0.57–1)
DEPRECATED RDW RBC AUTO: 49.6 FL (ref 37–54)
EGFRCR SERPLBLD CKD-EPI 2021: 94 ML/MIN/1.73
EOSINOPHIL # BLD AUTO: 0.04 10*3/MM3 (ref 0–0.4)
EOSINOPHIL NFR BLD AUTO: 0.4 % (ref 0.3–6.2)
ERYTHROCYTE [DISTWIDTH] IN BLOOD BY AUTOMATED COUNT: 16.4 % (ref 12.3–15.4)
GLUCOSE SERPL-MCNC: 107 MG/DL (ref 65–99)
HCT VFR BLD AUTO: 27.1 % (ref 34–46.6)
HGB BLD-MCNC: 8.4 G/DL (ref 12–15.9)
IMM GRANULOCYTES # BLD AUTO: 0.05 10*3/MM3 (ref 0–0.05)
IMM GRANULOCYTES NFR BLD AUTO: 0.5 % (ref 0–0.5)
LYMPHOCYTES # BLD AUTO: 0.87 10*3/MM3 (ref 0.7–3.1)
LYMPHOCYTES NFR BLD AUTO: 8.2 % (ref 19.6–45.3)
MCH RBC QN AUTO: 25.5 PG (ref 26.6–33)
MCHC RBC AUTO-ENTMCNC: 31 G/DL (ref 31.5–35.7)
MCV RBC AUTO: 82.4 FL (ref 79–97)
MONOCYTES # BLD AUTO: 1.05 10*3/MM3 (ref 0.1–0.9)
MONOCYTES NFR BLD AUTO: 9.8 % (ref 5–12)
NEUTROPHILS NFR BLD AUTO: 8.65 10*3/MM3 (ref 1.7–7)
NEUTROPHILS NFR BLD AUTO: 81 % (ref 42.7–76)
NRBC BLD AUTO-RTO: 0 /100 WBC (ref 0–0.2)
PLATELET # BLD AUTO: 312 10*3/MM3 (ref 140–450)
PMV BLD AUTO: 9.3 FL (ref 6–12)
POTASSIUM SERPL-SCNC: 3.5 MMOL/L (ref 3.5–5.2)
RBC # BLD AUTO: 3.29 10*6/MM3 (ref 3.77–5.28)
SODIUM SERPL-SCNC: 137 MMOL/L (ref 136–145)
WBC NRBC COR # BLD AUTO: 10.67 10*3/MM3 (ref 3.4–10.8)

## 2024-02-23 PROCEDURE — 94799 UNLISTED PULMONARY SVC/PX: CPT

## 2024-02-23 PROCEDURE — 97110 THERAPEUTIC EXERCISES: CPT

## 2024-02-23 PROCEDURE — 80048 BASIC METABOLIC PNL TOTAL CA: CPT | Performed by: STUDENT IN AN ORGANIZED HEALTH CARE EDUCATION/TRAINING PROGRAM

## 2024-02-23 PROCEDURE — 97535 SELF CARE MNGMENT TRAINING: CPT

## 2024-02-23 PROCEDURE — 25810000003 SODIUM CHLORIDE 0.9 % SOLUTION: Performed by: INTERNAL MEDICINE

## 2024-02-23 PROCEDURE — 25010000002 CEFTRIAXONE SODIUM-DEXTROSE 1-3.74 GM-%(50ML) RECONSTITUTED SOLUTION: Performed by: STUDENT IN AN ORGANIZED HEALTH CARE EDUCATION/TRAINING PROGRAM

## 2024-02-23 PROCEDURE — 94761 N-INVAS EAR/PLS OXIMETRY MLT: CPT

## 2024-02-23 PROCEDURE — 85025 COMPLETE CBC W/AUTO DIFF WBC: CPT | Performed by: STUDENT IN AN ORGANIZED HEALTH CARE EDUCATION/TRAINING PROGRAM

## 2024-02-23 PROCEDURE — 25010000002 HEPARIN (PORCINE) PER 1000 UNITS: Performed by: ORTHOPAEDIC SURGERY

## 2024-02-23 PROCEDURE — 99232 SBSQ HOSP IP/OBS MODERATE 35: CPT | Performed by: STUDENT IN AN ORGANIZED HEALTH CARE EDUCATION/TRAINING PROGRAM

## 2024-02-23 RX ADMIN — Medication 10 ML: at 21:32

## 2024-02-23 RX ADMIN — ESCITALOPRAM OXALATE 10 MG: 10 TABLET ORAL at 08:18

## 2024-02-23 RX ADMIN — HEPARIN SODIUM 5000 UNITS: 5000 INJECTION, SOLUTION INTRAVENOUS; SUBCUTANEOUS at 21:33

## 2024-02-23 RX ADMIN — Medication 1 PATCH: at 08:15

## 2024-02-23 RX ADMIN — FAMOTIDINE 40 MG: 20 TABLET, FILM COATED ORAL at 08:18

## 2024-02-23 RX ADMIN — BUDESONIDE 0.5 MG: 0.5 INHALANT RESPIRATORY (INHALATION) at 07:07

## 2024-02-23 RX ADMIN — HEPARIN SODIUM 5000 UNITS: 5000 INJECTION, SOLUTION INTRAVENOUS; SUBCUTANEOUS at 06:17

## 2024-02-23 RX ADMIN — Medication 10 ML: at 08:22

## 2024-02-23 RX ADMIN — CILOSTAZOL 50 MG: 100 TABLET ORAL at 21:29

## 2024-02-23 RX ADMIN — CEFTRIAXONE 1000 MG: 1 INJECTION, SOLUTION INTRAVENOUS at 15:05

## 2024-02-23 RX ADMIN — HEPARIN SODIUM 5000 UNITS: 5000 INJECTION, SOLUTION INTRAVENOUS; SUBCUTANEOUS at 15:05

## 2024-02-23 RX ADMIN — ACETAMINOPHEN 650 MG: 325 TABLET, FILM COATED ORAL at 21:29

## 2024-02-23 RX ADMIN — ASPIRIN 81 MG: 81 TABLET, COATED ORAL at 08:17

## 2024-02-23 RX ADMIN — CILOSTAZOL 50 MG: 100 TABLET ORAL at 08:18

## 2024-02-23 RX ADMIN — SODIUM CHLORIDE 50 ML/HR: 9 INJECTION, SOLUTION INTRAVENOUS at 01:00

## 2024-02-23 RX ADMIN — ATORVASTATIN CALCIUM 20 MG: 20 TABLET, FILM COATED ORAL at 21:29

## 2024-02-23 RX ADMIN — LOSARTAN POTASSIUM 50 MG: 50 TABLET, FILM COATED ORAL at 08:17

## 2024-02-23 RX ADMIN — DOCUSATE SODIUM 50MG AND SENNOSIDES 8.6MG 2 TABLET: 8.6; 5 TABLET, FILM COATED ORAL at 08:18

## 2024-02-23 NOTE — THERAPY TREATMENT NOTE
Patient Name: Tracey Nelson  : 1940    MRN: [id number]                              Today's Date: 2024       Admit Date: 2024    Visit Dx:     ICD-10-CM ICD-9-CM   1. Closed fracture of left hip, initial encounter  S72.002A 820.8   2. Closed hip fracture  S72.009A 820.8   3. Abnormal CT of the chest  R93.89 793.2   4. Chronic cough  R05.3 786.2     Patient Active Problem List   Diagnosis    Abnormal CT of the chest    Chronic cough    Closed left hip fracture    Peripheral artery disease     Past Medical History:   Diagnosis Date    Allergies     Bronchitis     Cataract, bilateral     s/p surgery    Coronary artery disease     Hyperlipidemia     LBBB (left bundle branch block) 2018    Myocardial infarction     pt denies    PAD (peripheral artery disease)     stent in each leg    Pneumonia     Skin cancer     Wears dentures     upper plate and lower is a partial     Past Surgical History:   Procedure Laterality Date    CAROTID ENDARTERECTOMY Left     CATARACT EXTRACTION W/ INTRAOCULAR LENS  IMPLANT, BILATERAL      COLONOSCOPY      DILATION AND CURETTAGE, DIAGNOSTIC / THERAPEUTIC      HIP BIPOLAR REPLACEMENT Left 2024    Procedure: HIP BIPOLAR REPLACEMENT LEFT;  Surgeon: Cedrick Gomze MD;  Location: [facility];  Service: Orthopedics;  Laterality: Left;    OTHER SURGICAL HISTORY Bilateral     stent in each leg    SKIN BIOPSY      SKIN CANCER EXCISION      TONSILLECTOMY        General Information       Row Name 24 1208          OT Time and Intention    Document Type therapy note (daily note)  -SP     Mode of Treatment occupational therapy  -SP       Row Name 24 1208          General Information    Patient Profile Reviewed yes  -SP     Existing Precautions/Restrictions fall;oxygen therapy device and L/min;hip, posterior  -SP       Row Name 24 1208          Cognition    Orientation Status (Cognition) oriented x 4  -SP       Row Name 24 1208          Safety Issues,  Functional Mobility    Impairments Affecting Function (Mobility) balance;endurance/activity tolerance;cognition;strength;pain;postural/trunk control  -SP     Cognitive Impairments, Mobility Safety/Performance awareness, need for assistance;attention;insight into deficits/self-awareness;problem-solving/reasoning;safety precaution follow-through;sequencing abilities  -SP               User Key  (r) = Recorded By, (t) = Taken By, (c) = Cosigned By      Initials Name Provider Type    SP Ladan Lindsay OT Occupational Therapist                     Mobility/ADL's       Row Name 02/23/24 1209          Transfers    Transfers sit-stand transfer;stand-sit transfer  -SP     Comment, (Transfers) STS x2 from the chair  -SP       Row Name 02/23/24 1209          Sit-Stand Transfer    Sit-Stand Miner (Transfers) minimum assist (75% patient effort);verbal cues;nonverbal cues (demo/gesture)  -SP     Assistive Device (Sit-Stand Transfers) walker, front-wheeled  -SP       Row Name 02/23/24 1209          Stand-Sit Transfer    Stand-Sit Miner (Transfers) minimum assist (75% patient effort);nonverbal cues (demo/gesture);verbal cues  -SP     Assistive Device (Stand-Sit Transfers) walker, front-wheeled  -SP       Row Name 02/23/24 1209          Mobility    Extremity Weight-bearing Status left lower extremity  posterior hip precuations  -SP     Left Lower Extremity (Weight-bearing Status) weight-bearing as tolerated (WBAT)  -SP       Row Name 02/23/24 1209          Lower Body Dressing Assessment/Training    Miner Level (Lower Body Dressing) doff;socks;minimum assist (75% patient effort);nonverbal cues (demo/gesture);verbal cues;don;pants/bottoms  -SP     Assistive Devices (Lower Body Dressing) reacher  -SP     Position (Lower Body Dressing) unsupported sitting  -SP       Row Name 02/23/24 1209          Self-Feeding Assessment/Training    Miner Level (Feeding) feeding skills;independent  -SP     Position  (Self-Feeding) supported sitting  -SP               User Key  (r) = Recorded By, (t) = Taken By, (c) = Cosigned By      Initials Name Provider Type    Ladan Bashir OT Occupational Therapist                   Obj/Interventions    No documentation.                  Goals/Plan    No documentation.                  Clinical Impression       Row Name 02/23/24 1212          Pain Assessment    Pretreatment Pain Rating 1/10  -SP     Posttreatment Pain Rating 1/10  -SP     Pain Location - Side/Orientation Left  -SP     Pain Location lateral  -SP     Pain Location - hip  -SP     Pain Intervention(s) Repositioned;Ambulation/increased activity  -SP       Row Name 02/23/24 1212          Plan of Care Review    Plan of Care Reviewed With patient  -SP     Progress no change  -SP     Outcome Evaluation Pt received up in the chair and agreeable to work with OT for AE training to increase her independence with ADLs while adhering to precuations. She is unable to recall any precuations at this time and endorses 1/10 pain in the L hip. She was demonstrated proper use of a reacher to doff socks and required min A with max verbal and mod tactile cues due to poor carry over of learning with increased time. Pt transferred to standing with min A with the rolling walker and vc/tcs and was able to doff brief to mid thigh with UE balance support. Pt transferred to sitting and utilized the reacher to doff brief with min A and she was able to don brief for AE training with mod A and increased cues/time. Pt repositioned in the chair with the wedge in place. She demonstrates poor sequencing and carry over of learning requiring increased physical and verbal cueing for lower body dressing. Continue current POC. Pt would benefit from rehab if she is agreeable if she chooses to return home she would benefit from initial 24/7 assistance and  PT/OT.  -SP       Row Name 02/23/24 1212          Vital Signs    Pre SpO2 (%) 91  -SP     O2 Delivery Pre  Treatment room air  -SP     O2 Delivery Intra Treatment room air  -SP     Post SpO2 (%) 91  -SP     O2 Delivery Post Treatment room air  -SP     Pre Patient Position Sitting  -SP     Intra Patient Position Standing  -SP     Post Patient Position Sitting  -SP       Row Name 02/23/24 1212          Positioning and Restraints    Pre-Treatment Position sitting in chair/recliner  -SP     Post Treatment Position chair  -SP     In Chair reclined;call light within reach;encouraged to call for assist;exit alarm on  -SP               User Key  (r) = Recorded By, (t) = Taken By, (c) = Cosigned By      Initials Name Provider Type    Ladan Bashir OT Occupational Therapist                   Outcome Measures       Row Name 02/23/24 1223          How much help from another is currently needed...    Putting on and taking off regular lower body clothing? 2  -SP     Bathing (including washing, rinsing, and drying) 2  -SP     Toileting (which includes using toilet bed pan or urinal) 2  -SP     Putting on and taking off regular upper body clothing 3  -SP     Taking care of personal grooming (such as brushing teeth) 3  -SP     Eating meals 4  -SP     AM-PAC 6 Clicks Score (OT) 16  -SP       Row Name 02/23/24 1223          Functional Assessment    Outcome Measure Options AM-PAC 6 Clicks Daily Activity (OT)  -SP               User Key  (r) = Recorded By, (t) = Taken By, (c) = Cosigned By      Initials Name Provider Type    Ladan Bashir OT Occupational Therapist                    Occupational Therapy Education       Title: PT OT SLP Therapies (In Progress)       Topic: Occupational Therapy (In Progress)       Point: ADL training (Done)       Description:   Instruct learner(s) on proper safety adaptation and remediation techniques during self care or transfers.   Instruct in proper use of assistive devices.                  Learning Progress Summary             Patient Acceptance, TB, NR,DU by ZUHAIR at 2/23/2024 1224    Comment: Pt  educated on use of AE for LBD with increased cues and becky required    Acceptance, E,TB, VU by SD at 2/22/2024 1517    Comment: Benefit of OOB    Acceptance, E,TB, VU by SD at 2/21/2024 1339    Comment: OT POC                         Point: Home exercise program (Not Started)       Description:   Instruct learner(s) on appropriate technique for monitoring, assisting and/or progressing therapeutic exercises/activities.                  Learner Progress:  Not documented in this visit.              Point: Precautions (Not Started)       Description:   Instruct learner(s) on prescribed precautions during self-care and functional transfers.                  Learner Progress:  Not documented in this visit.              Point: Body mechanics (Not Started)       Description:   Instruct learner(s) on proper positioning and spine alignment during self-care, functional mobility activities and/or exercises.                  Learner Progress:  Not documented in this visit.                              User Key       Initials Effective Dates Name Provider Type Discipline    SD 06/16/21 -  Cait Pacheco OT Occupational Therapist OT    SP 09/08/22 -  Ladan Lindsay OT Occupational Therapist OT                  OT Recommendation and Plan     Plan of Care Review  Plan of Care Reviewed With: patient  Progress: no change  Outcome Evaluation: Pt received up in the chair and agreeable to work with OT for AE training to increase her independence with ADLs while adhering to precuations. She is unable to recall any precuations at this time and endorses 1/10 pain in the L hip. She was demonstrated proper use of a reacher to doff socks and required min A with max verbal and mod tactile cues due to poor carry over of learning with increased time. Pt transferred to standing with min A with the rolling walker and vc/tcs and was able to doff brief to mid thigh with UE balance support. Pt transferred to sitting and utilized the reacher to doff  brief with min A and she was able to don brief for AE training with mod A and increased cues/time. Pt repositioned in the chair with the wedge in place. She demonstrates poor sequencing and carry over of learning requiring increased physical and verbal cueing for lower body dressing. Continue current POC. Pt would benefit from rehab if she is agreeable if she chooses to return home she would benefit from initial 24/7 assistance and  PT/OT.     Time Calculation:         Time Calculation- OT       Row Name 02/23/24 1225             Time Calculation- OT    OT Start Time 1139  -SP      OT Stop Time 1203  -SP      OT Time Calculation (min) 24 min  -SP      OT Received On 02/23/24  -SP      OT Goal Re-Cert Due Date 03/04/24  -SP         Timed Charges    25877 - OT Self Care/Mgmt Minutes 24  -SP         Total Minutes    Timed Charges Total Minutes 24  -SP       Total Minutes 24  -SP                User Key  (r) = Recorded By, (t) = Taken By, (c) = Cosigned By      Initials Name Provider Type    SP Ladan Lindsay OT Occupational Therapist                  Therapy Charges for Today       Code Description Service Date Service Provider Modifiers Qty    [id number] HC OT SELF CARE/MGMT/TRAIN EA 15 MIN 2/23/2024 Ladan Lindsay OT GO 2                 Ladan Lindsay OT  2/23/2024

## 2024-02-23 NOTE — PLAN OF CARE
Goal Outcome Evaluation:  Plan of Care Reviewed With: patient        Progress: improving  Outcome Evaluation: Pt  supine in bed  and willing to participate with treatment.  Pt performed supine to sit min a with several vc's.  Pt performed sit to stand min/mod a with rw.  Pt advanced gait to 12' min a with rw.  Pt requires vc's for directing rw, walker proximity, THP, exercise techniques and sequencing for all mobility.  Pt once UIC performed L LE exercises.  Pt left uic reclined with ca ll light at hand exit alarm in place and activated.  Nurse informed.  See flowsheet for details. Cont PT per POC progressing to goals as pt tolerates.

## 2024-02-23 NOTE — PLAN OF CARE
Goal Outcome Evaluation:  Plan of Care Reviewed With: patient        Progress: no change  Outcome Evaluation: Pt received up in the chair and agreeable to work with OT for AE training to increase her independence with ADLs while adhering to precuations. She is unable to recall any precuations at this time and endorses 1/10 pain in the L hip. She was demonstrated proper use of a reacher to doff socks and required min A with max verbal and mod tactile cues due to poor carry over of learning with increased time. Pt transferred to standing with min A with the rolling walker and vc/tcs and was able to doff brief to mid thigh with UE balance support. Pt transferred to sitting and utilized the reacher to doff brief with min A and she was able to don brief for AE training with mod A and increased cues/time. Pt repositioned in the chair with the wedge and chair alarm in place. She demonstrates poor sequencing and carry over of learning requiring increased physical and verbal cueing for lower body dressing. Continue current POC. Pt would benefit from rehab if she is agreeable if she chooses to return home she would benefit from initial 24/7 assistance and HH PT/OT.

## 2024-02-23 NOTE — CASE MANAGEMENT/SOCIAL WORK
Case Management/Social Work    Patient Name:  Tracey Nelson  YOB: 1940  MRN: [id number]  Admit Date:  2/19/2024        10:20 EST  Met with patient at bedside. Remains adamant she wants to discharge home with HH. [facility] accepted. States she needs a walker and BSC at discharge.    16:01 EST  BSC and walker delivered to patient.      Electronically signed by:  Scott Pérez RN  02/23/24 10:20 EST

## 2024-02-23 NOTE — THERAPY TREATMENT NOTE
Patient Name: Tracey Nelson  : 1940    MRN: [id number]                              Today's Date: 2024       Admit Date: 2024    Visit Dx:     ICD-10-CM ICD-9-CM   1. Closed fracture of left hip, initial encounter  S72.002A 820.8   2. Closed hip fracture  S72.009A 820.8   3. Abnormal CT of the chest  R93.89 793.2   4. Chronic cough  R05.3 786.2     Patient Active Problem List   Diagnosis    Abnormal CT of the chest    Chronic cough    Closed left hip fracture    Peripheral artery disease     Past Medical History:   Diagnosis Date    Allergies     Bronchitis     Cataract, bilateral     s/p surgery    Coronary artery disease     Hyperlipidemia     LBBB (left bundle branch block) 2018    Myocardial infarction     pt denies    PAD (peripheral artery disease)     stent in each leg    Pneumonia     Skin cancer     Wears dentures     upper plate and lower is a partial     Past Surgical History:   Procedure Laterality Date    CAROTID ENDARTERECTOMY Left     CATARACT EXTRACTION W/ INTRAOCULAR LENS  IMPLANT, BILATERAL      COLONOSCOPY      DILATION AND CURETTAGE, DIAGNOSTIC / THERAPEUTIC      HIP BIPOLAR REPLACEMENT Left 2024    Procedure: HIP BIPOLAR REPLACEMENT LEFT;  Surgeon: Cedrick Gomez MD;  Location: [facility];  Service: Orthopedics;  Laterality: Left;    OTHER SURGICAL HISTORY Bilateral     stent in each leg    SKIN BIOPSY      SKIN CANCER EXCISION      TONSILLECTOMY        General Information       Row Name 24 1325          Physical Therapy Time and Intention    Document Type therapy note (daily note)  -RM     Mode of Treatment physical therapy  -RM       Row Name 24 1325          General Information    Patient Profile Reviewed yes  -RM     Existing Precautions/Restrictions fall;hip, posterior  -RM       Row Name 24 1325          Cognition    Orientation Status (Cognition) oriented x 4  -RM       Row Name 24 1325          Safety Issues, Functional Mobility     Safety Issues Affecting Function (Mobility) safety precautions follow-through/compliance;positioning of assistive device;safety precaution awareness;sequencing abilities  -     Impairments Affecting Function (Mobility) balance;endurance/activity tolerance;cognition;strength;pain;postural/trunk control  -     Cognitive Impairments, Mobility Safety/Performance insight into deficits/self-awareness;sequencing abilities;safety precaution awareness;problem-solving/reasoning  -               User Key  (r) = Recorded By, (t) = Taken By, (c) = Cosigned By      Initials Name Provider Type     Rodrigo Garcia, PTA Physical Therapist Assistant                   Mobility       Row Name 02/23/24 1326          Bed Mobility    Supine-Sit Whiteside (Bed Mobility) minimum assist (75% patient effort);verbal cues  -     Assistive Device (Bed Mobility) bed rails;head of bed elevated  -       Row Name 02/23/24 1326          Sit-Stand Transfer    Sit-Stand [place]iteside (Transfers) minimum assist (75% patient effort);verbal cues;nonverbal cues (demo/gesture)  -     Assistive Device (Sit-Stand Transfers) walker, front-wheeled  -       Row Name 02/23/24 1326          Gait/Stairs (Locomotion)    Whiteside Level (Gait) minimum assist (75% patient effort);verbal cues;nonverbal cues (demo/gesture)  -     Assistive Device (Gait) walker, front-wheeled  -RM     Patient was able to Ambulate yes  -RM     Distance in Feet (Gait) 12'  -RM     Deviations/Abnormal Patterns (Gait) asher decreased;ataxic;stride length decreased;weight shifting decreased;gait speed decreased;bilateral deviations;base of support, wide  -RM     Bilateral Gait Deviations forward flexed posture;weight shift ability decreased  -       Row Name 02/23/24 1326          Mobility    Left Lower Extremity (Weight-bearing Status) weight-bearing as tolerated (WBAT)  -               User Key  (r) = Recorded By, (t) = Taken By, (c) = Cosigned By       Initials Name Provider Type    Rodrigo Rodriguez, COLTEN Physical Therapist Assistant                   Obj/Interventions       Row Name 02/23/24 1327          Motor Skills    Therapeutic Exercise hip;knee;ankle  -RM       Row Name 02/23/24 1327          Hip (Therapeutic Exercise)    Hip (Therapeutic Exercise) isometric exercises;strengthening exercise  -RM     Hip Isometrics (Therapeutic Exercise) bilateral;gluteal sets;10 repetitions  -RM     Hip Strengthening (Therapeutic Exercise) left;heel slides;10 repetitions  -RM       Row Name 02/23/24 1327          Knee (Therapeutic Exercise)    Knee (Therapeutic Exercise) isometric exercises  -RM     Knee Isometrics (Therapeutic Exercise) left;quad sets;10 repetitions  -RM       Row Name 02/23/24 1327          Ankle (Therapeutic Exercise)    Ankle (Therapeutic Exercise) AROM (active range of motion)  -RM     Ankle AROM (Therapeutic Exercise) bilateral;dorsiflexion;plantarflexion;10 repetitions  -RM               User Key  (r) = Recorded By, (t) = Taken By, (c) = Cosigned By      Initials Name Provider Type     Rodrigo Garcia, COLTEN Physical Therapist Assistant                   Goals/Plan    No documentation.                  Clinical Impression       Row Name 02/23/24 1327          Pain    Pretreatment Pain Rating 0/10 - no pain  -RM     Posttreatment Pain Rating 0/10 - no pain  -RM     Pain Location - Side/Orientation Left  -RM     Pain Location - hip  -RM     Pre/Posttreatment Pain Comment 3/10 with mobility  -RM     Pain Intervention(s) Repositioned;Ambulation/increased activity  -RM       Row Name 02/23/24 1327          Plan of Care Review    Plan of Care Reviewed With patient  -RM     Progress improving  -RM     Outcome Evaluation Pt  supine in bed  and willing to participate with treatment.  Pt performed supine to sit min a with several vc's.  Pt performed sit to stand min/mod a with rw.  Pt advanced gait to 12' min a with rw.  Pt requires vc's for directing rw,  walker proximity, THP, exercise techniques and sequencing for all mobility.  Pt once UIC performed L LE exercises.  Pt left uic reclined with ca ll light at hand exit alarm in place and activated.  Nurse informed.  See flowsheet for details. Cont PT per POC progressing to goals as pt tolerates.  -RM       Row Name 02/23/24 1327          Positioning and Restraints    Pre-Treatment Position in bed  -RM     Post Treatment Position chair  -RM     In Chair reclined;call light within reach;encouraged to call for assist;exit alarm on;notified nsg;ABD pillow  -RM               User Key  (r) = Recorded By, (t) = Taken By, (c) = Cosigned By      Initials Name Provider Type    RM Rodrigo Garcia, PTA Physical Therapist Assistant                   Outcome Measures       Row Name 02/23/24 1353          How much help from another person do you currently need...    Turning from your back to your side while in flat bed without using bedrails? 3  -RM     Moving from lying on back to sitting on the side of a flat bed without bedrails? 3  -RM     Moving to and from a bed to a chair (including a wheelchair)? 3  -RM     Standing up from a chair using your arms (e.g., wheelchair, bedside chair)? 2  -RM     Climbing 3-5 steps with a railing? 1  -RM     To walk in hospital room? 2  -RM     AM-PAC 6 Clicks Score (PT) 14  -RM     Highest Level of Mobility Goal 4 --> Transfer to chair/commode  -RM       Row Name 02/23/24 1353 02/23/24 1223       Functional Assessment    Outcome Measure Options AM-PAC 6 Clicks Basic Mobility (PT)  -RM AM-PAC 6 Clicks Daily Activity (OT)  -SP              User Key  (r) = Recorded By, (t) = Taken By, (c) = Cosigned By      Initials Name Provider Type    Rodrigo Rodriguez, PTA Physical Therapist Assistant    Ladan Bashir OT Occupational Therapist                                 Physical Therapy Education       Title: PT OT SLP Therapies (In Progress)       Topic: Physical Therapy (Done)       Point:  Mobility training (Done)       Learning Progress Summary             Patient Acceptance, E,TB,D, VU,NR by  at 2/23/2024 1403    Acceptance, E,TB,D, VU,NR by  at 2/22/2024 1524    Comment: sequencing for mobility   safety with mobility    Eager, E, VU,NR by KB at 2/21/2024 1205   Family Eager, E, VU,NR by KB at 2/21/2024 1205                         Point: Home exercise program (Done)       Learning Progress Summary             Patient Acceptance, E,TB,D, VU,NR by  at 2/23/2024 1403    Eager, E, VU,NR by  at 2/21/2024 1205   Family Eager, E, VU,NR by  at 2/21/2024 1205                         Point: Body mechanics (Done)       Learning Progress Summary             Patient Eager, E, VU,NR by  at 2/21/2024 1205   Family Eager, E, VU,NR by  at 2/21/2024 1205                         Point: Precautions (Done)       Learning Progress Summary             Patient Acceptance, E,TB,D, VU,NR by  at 2/23/2024 1403    Eager, E, VU,NR by  at 2/21/2024 1205   Family Eager, E, VU,NR by  at 2/21/2024 1205                                         User Key       Initials Effective Dates Name Provider Type Discipline     06/16/21 -  Rodrigo Garcia, PTA Physical Therapist Assistant PT     01/18/24 -  Magali Nieves, PT Student PT Student PT                  PT Recommendation and Plan     Plan of Care Reviewed With: patient  Progress: improving  Outcome Evaluation: Pt  supine in bed  and willing to participate with treatment.  Pt performed supine to sit min a with several vc's.  Pt performed sit to stand min/mod a with rw.  Pt advanced gait to 12' min a with rw.  Pt requires vc's for directing rw, walker proximity, THP, exercise techniques and sequencing for all mobility.  Pt once UIC performed L LE exercises.  Pt left uic reclined with ca ll light at hand exit alarm in place and activated.  Nurse informed.  See flowsheet for details. Cont PT per POC progressing to goals as pt tolerates.     Time Calculation:          PT Charges       Row Name 02/23/24 1403             Time Calculation    Start Time 1050  -RM      Stop Time 1125  -RM      Time Calculation (min) 35 min  -RM      PT Received On 02/23/24  -RM      PT Goal Re-Cert Due Date 03/02/24  -RM         Time Calculation- PT    Total Timed Code Minutes- PT 35 minute(s)  -RM         Timed Charges    03277 - PT Therapeutic Exercise Minutes 25  -RM      96827 - Gait Training Minutes  10  -RM         Total Minutes    Timed Charges Total Minutes 35  -RM       Total Minutes 35  -RM                User Key  (r) = Recorded By, (t) = Taken By, (c) = Cosigned By      Initials Name Provider Type    Rodrigo Rodriguez, PTA Physical Therapist Assistant                  Therapy Charges for Today       Code Description Service Date Service Provider Modifiers Qty    13617201935 HC PT NEUROMUSC RE EDUCATION EA 15 MIN 2/22/2024 Rodrigo Garcia, PTA GP 1    12157279190 HC PT THERAPEUTIC ACT EA 15 MIN 2/22/2024 Rodrigo Garcia, PTA GP 1    63419994074 HC PT THER PROC EA 15 MIN 2/23/2024 Rodrigo Garcia, PTA GP 2            PT G-Codes  Outcome Measure Options: AM-PAC 6 Clicks Basic Mobility (PT)  AM-PAC 6 Clicks Score (PT): 14  AM-PAC 6 Clicks Score (OT): 16       Rodrigo Garcia PTA  2/23/2024

## 2024-02-23 NOTE — PROGRESS NOTES
[facility]    Name:  Tracey Nelson   Age:  83 y.o.  Sex:  female  :  1940  MRN:  [id number]   Visit Number:  [id number]  Admission Date:  2024  Date Of Service:  24  Primary Care Physician:  Leola Wright MD     LOS: 4 days :    Chief Complaint:      Follow-up; hip pain    Subjective:    Feels good today, no pain at rest.  Does have some pain with ambulation but it is manageable.  Agreeable to go home tomorrow morning.  She does not want to go to rehab, would like home with home health.    Hospital Course:    Tracey Nelson is an 83-year-old female with history of coronary artery disease, left bundle branch block, hypertension, peripheral artery disease was brought to the emergency room by her friend after she sustained a fall and developed left hip pain.  Patient was with her friend who brought her to the emergency room.  Patient apparently was going to get an echocardiogram done at [place] and her friend was driving but this happened while she was getting into the car.  Patient apparently hit her left forehead as well.  Patient states that she does have peripheral arterial disease and takes aspirin and has had stents in her legs.  She denies any prior history of coronary artery disease or coronary stents.  At time of admission only reporting concern of mild headache and left hip pain.     In the emergency room, she was afebrile and hemodynamically stable except for elevated blood pressure of 164/58.  Initial pulse oxygen saturation was 94% on room air but she subsequently dropped to 87% and had to be placed on 2 L of nasal cannula oxygen.  Blood work including CMP and CBC was unremarkable except for a sodium of 132 and a hemoglobin of 9.2 (baseline 11).  INR 1.19.  CT of the head and CT of the cervical spine were unremarkable.  Chest x-ray showed abnormal retrocardiac density left lung base which she has been following up with Dr. Ramirez.   X-ray of the hip with pelvis showed left subcapital femoral neck fracture.  Patient's condition was discussed with Dr. Gomez and the patient was subsequently admitted to the medical floor for treatment of left hip fracture.    Review of Systems:     All systems were reviewed and negative except as mentioned in subjective, assessment and plan.    Vital Signs:    Temp:  [97 °F (36.1 °C)-98.5 °F (36.9 °C)] 97 °F (36.1 °C)  Heart Rate:  [] 114  Resp:  [16-24] 20  BP: (122-180)/(40-65) 158/58    Intake and output:    I/O last 3 completed shifts:  In: 1372 [P.O.:600; I.V.:772]  Out: 1145 [Urine:1145]  I/O this shift:  In: 320 [P.O.:320]  Out: 700 [Urine:700]    Physical Examination:    General Appearance:  Alert and appropriately cooperative.   Head:  Atraumatic and normocephalic.   Eyes: Conjunctivae and sclerae normal, no icterus. No pallor.   Throat: No oral lesions, no thrush, oral mucosa moist.   Neck: Supple, trachea midline, no thyromegaly.   Lungs:   Breath sounds heard bilaterally equally.  No wheezing or crackles. No Pleural rub or bronchial breathing.   Heart:  Normal S1 and S2, no murmur, no gallop, no rub. No JVD.   Abdomen:   Normal bowel sounds, no masses, no organomegaly. Soft, nontender, nondistended, no rebound tenderness.   Extremities: Supple, no edema, no cyanosis, no clubbing.   Skin: Warm.   Neurologic: Appropriately conversational.  No facial asymmetry. Moves all four limbs. No tremors.      Laboratory results:    Results from last 7 days   Lab Units 02/23/24  0528 02/22/24  0421 02/21/24  0418   SODIUM mmol/L 137 136 133*   POTASSIUM mmol/L 3.5 3.4* 4.0   CHLORIDE mmol/L 103 99 98   CO2 mmol/L 24.6 25.7 21.4*   BUN mg/dL 19 16 17   CREATININE mg/dL 0.47* 0.53* 0.66   CALCIUM mg/dL 9.1 9.2 9.8   GLUCOSE mg/dL 107* 140* 262*     Results from last 7 days   Lab Units 02/23/24  0528 02/22/24  0421 02/21/24  0418   WBC 10*3/mm3 10.67 15.86* 12.08*   HEMOGLOBIN g/dL 8.4* 8.4* 11.0*   HEMATOCRIT %  "27.1* 27.8* 36.8   PLATELETS 10*3/mm3 312 343 425     Results from last 7 days   Lab Units 02/19/24  1546   INR  1.19*     Results from last 7 days   Lab Units 02/20/24  0707 02/20/24  0523   HSTROP T ng/L 18* 19*     Results from last 7 days   Lab Units 02/21/24  1252 02/21/24  1247   BLOODCX  No growth at 2 days No growth at 2 days     No results for input(s): \"PHART\", \"MXG6YBH\", \"PO2ART\", \"MXL1QJP\", \"BASEEXCESS\" in the last 8760 hours.   I have reviewed the patient's laboratory results.    Radiology results:    No radiology results from the last 24 hrs  I have reviewed the patient's radiology reports.    Medication Review:     I have reviewed the patient's active and prn medications.     Problem List:      Closed left hip fracture    Peripheral artery disease    Severe malnutrition      Assessment:     Status post mechanical fall and left closed hip fracture, POA.  Peripheral artery disease with previous peripheral stents.  Coronary artery disease on medical therapy.  Essential hypertension.  COPD, no exacerbation.     Plan:     Comfortable in chair, pleasantly conversational.  Appropriately conversational.    Sister and Son updated during course.    Delirium, resolved  Urinary retention  Resolved as of morning 2/22.  Overnight did have over 900 mL urinary retention, this may have exacerbated her delirium.  Continue to monitor in the ICU.  Fall precautions.  Haldol as needed.  Do not suspect stroke. Family denies history of dementia.    Left lower lobe pneumonia  Rocephin.  May have aspiration.    Left hip fracture.  - Continue morphine, Lortab and Zofran for symptomatic control.  - s/p ORIF 2/20  - Cardiology assessed moderate risk, no contraindications to surgery.     PAD/CAD  - Continue home medications including aspirin, simvastatin.     COPD.  - No evidence of exacerbation.  - Continue bronchodilators as needed.     Risk Assessment: Moderate  DVT Prophylaxis: Lovenox  Code Status: Full  Diet: " Cardiac  Discharge Plan: Home with home health 2/24 likely    Brian Joseph Kerley, DO  02/23/24  15:43 EST    Dictated utilizing Dragon dictation.

## 2024-02-23 NOTE — PAYOR COMM NOTE
"To:  [employer]  From: Ana Bills RN  Phone: [phone]  Fax: [phone]  NPI: [id number]  TIN: [id number]  Member ID: [id number]   MRN: [id number]    Tracey Nelson (83 y.o. Female)       Date of Birth   1940    Social Security Number       Address   [address] [place] [address]    Home Phone   [phone]    MRN   [id number]       Taoist   None    Marital Status                               Admission Date   2/19/24    Admission Type   Emergency    Admitting Provider   Jhon Burnett MD    Attending Provider   Kerley, Brian Joseph, DO    [facility]       Discharge Date       Discharge Disposition       Discharge Destination                                 Attending Provider: Kerley, Brian Joseph, DO    Allergies: No Known Allergies    Isolation: None   Infection: None   Code Status: CPR    Ht: 165.1 cm (65\")   Wt: 46 kg (101 lb 6.6 oz)    Admission Cmt: None   Principal Problem: Closed left hip fracture [S72.002A]                   Active Insurance as of 2/19/2024       Primary Coverage       Payor Plan Insurance Group Employer/Plan Group    HU[employer] MEDICARE REPLACEMENT HU[employer] MED ADV PPO [id number]       Payor Plan Address Payor Plan Phone Number Payor Plan Fax Number Effective Dates    [address] [address] [phone]  2/1/2024 - None Entered    Formerly [facility] [id number]         Subscriber Name Subscriber Birth Date Member ID       TRACEY NELSON 1940 [id number]                     Emergency Contacts        (Rel.) Home Phone Work Phone Mobile Phone    Ang Nelson (Son) [phone] -- [phone]    JELANI RUTHERFORD (Sister) [phone] -- [phone]              Vital Signs (last day)       Date/Time Temp Temp src Pulse Resp BP Patient Position SpO2    02/23/24 1138 97.1 (36.2) Oral 96 22 180/61 Sitting 93    02/23/24 0730 97 (36.1) Oral -- 24 171/65 Lying 97    02/23/24 0707 -- -- -- 18 -- Lying 98    02/23/24 " OB PN    29 year old  female at 22w2d       S: No C/O's    O:   Visit Vitals  /81 (BP Location: LUE - Left upper extremity, Patient Position: Sitting, Cuff Size: Regular)   Pulse 82   Temp 97.2 °F (36.2 °C) (Tympanic)   Resp 18   Ht 5' 5\" (1.651 m)   Wt 64.4 kg (142 lb)   LMP 2022 (Exact Date)   SpO2 100%   BMI 23.63 kg/m²       Assessment/Plan:   -Travel/Zika/DVT Precautions  -Will make appt to see derm for fhx melanoma  -FKC   -asx elevated BP from baseline, udip pending, no evidence of pre-e on exam . Pre-e precautions given.   -See problem list    -Return in about 4 weeks (around 2023) for GUSTAVO Abdi MD         0314 98.3 (36.8) Oral 85 18 145/61 Lying 93    02/22/24 2325 98.5 (36.9) Axillary 74 16 122/41 Lying 98    02/22/24 1921 98.4 (36.9) Axillary 73 16 122/40 Lying 97    02/22/24 1700 -- -- 78 -- 146/57 -- 98    02/22/24 1600 -- -- 80 -- 150/55 -- 91    02/22/24 1500 -- -- 88 -- 163/58 -- 93    02/22/24 1440 -- -- 89 -- -- -- 97    02/22/24 1400 -- -- 97 -- 161/63 -- --    02/22/24 1300 -- -- 80 -- 146/71 -- 92    02/22/24 1200 -- -- 79 17 156/65 Sitting 98    02/22/24 1140 98.1 (36.7) Temporal -- -- -- -- --    02/22/24 1100 -- -- 73 -- 145/56 -- 98    02/22/24 1000 -- -- 89 -- 163/71 -- 100    02/22/24 0900 -- -- 98 -- 165/65 -- 97    02/22/24 0800 98.5 (36.9) Temporal 69 18 141/54 Lying 100    02/22/24 0707 -- -- -- 20 -- -- --    02/22/24 0700 -- -- 68 -- 146/51 -- 100    02/22/24 0600 -- -- 73 -- 149/48 -- 100    02/22/24 0500 -- -- 76 -- 144/46 -- 99    02/22/24 0400 97.5 (36.4) Axillary 80 -- 154/53 -- 100    02/22/24 0300 -- -- 85 22 153/47 -- 95    02/22/24 0200 -- -- 97 -- 162/63 -- 94    02/22/24 0100 -- -- 87 -- 167/65 -- 95    02/22/24 0000 98.3 (36.8) Temporal 80 21 161/65 -- 96          Current Facility-Administered Medications   Medication Dose Route Frequency Provider Last Rate Last Admin    acetaminophen (TYLENOL) tablet 650 mg  650 mg Oral Q4H PRN Cedrick Gomez MD   650 mg at 02/22/24 1534    Or    acetaminophen (TYLENOL) 160 MG/5ML oral solution 650 mg  650 mg Oral Q4H PRN Cedrick Gomez MD        Or    acetaminophen (TYLENOL) suppository 650 mg  650 mg Rectal Q4H PRN Cedrick Gomez MD        aspirin EC tablet 81 mg  81 mg Oral Daily Cedrick Gomez MD   81 mg at 02/23/24 0817    atorvastatin (LIPITOR) tablet 20 mg  20 mg Oral Nightly Cedrick Gomez MD   20 mg at 02/22/24 2205    sennosides-docusate (PERICOLACE) 8.6-50 MG per tablet 2 tablet  2 tablet Oral BID Cedrick Gomez MD   2 tablet at 02/23/24 0818    And    polyethylene glycol (MIRALAX) packet  17 g  17 g Oral Daily PRN Cedrick Gomez MD        And    bisacodyl (DULCOLAX) EC tablet 5 mg  5 mg Oral Daily PRN Cedrick Gomez MD        And    bisacodyl (DULCOLAX) suppository 10 mg  10 mg Rectal Daily PRN Cedrick Gomez MD        budesonide (PULMICORT) nebulizer solution 0.5 mg  0.5 mg Nebulization BID - RT Cedrick Gomez MD   0.5 mg at 02/23/24 0707    busPIRone (BUSPAR) tablet 10 mg  10 mg Oral BID PRN Judith Rhodes DO        cefTRIAXone (ROCEPHIN) IVPB 1000 mg/50ml dextrose (premix)  1,000 mg Intravenous Q24H Kerley, Brian Joseph,  mL/hr at 02/22/24 1259 1,000 mg at 02/22/24 1259    cilostazol (PLETAL) tablet 50 mg  50 mg Oral BID Cedrick Gomez MD   50 mg at 02/23/24 0818    escitalopram (LEXAPRO) tablet 10 mg  10 mg Oral Daily Kerley, Brian Joseph, DO   10 mg at 02/23/24 0818    ethyl alcohol 62 % 2 each  2 Swab Nasal Once Cedrick Gomez MD        famotidine (PEPCID) tablet 40 mg  40 mg Oral Daily Cedrick Gomez MD   40 mg at 02/23/24 0818    haloperidol lactate (HALDOL) injection 5 mg  5 mg Intravenous Q6H PRN Kerley, Brian Joseph, DO   5 mg at 02/21/24 2321    heparin (porcine) 5000 UNIT/ML injection 5,000 Units  5,000 Units Subcutaneous Q8H Cedrick Gomez MD   5,000 Units at 02/23/24 0617    hydrALAZINE (APRESOLINE) injection 10 mg  10 mg Intravenous Q6H PRN Shar Betancourt DO   10 mg at 02/21/24 0619    ipratropium-albuterol (DUO-NEB) nebulizer solution 3 mL  3 mL Nebulization 4x Daily PRN Cedrick Gomez MD        LORazepam (ATIVAN) injection 0.5 mg  0.5 mg Intravenous Q6H PRN Judith Rhodes DO   0.5 mg at 02/22/24 0216    losartan (COZAAR) tablet 50 mg  50 mg Oral Daily Cedrick Gomez MD   50 mg at 02/23/24 0817    Morphine sulfate (PF) injection 1 mg  1 mg Intravenous Q4H PRN Cedrick Gomez MD        And    naloxone (NARCAN) injection 0.4 mg  0.4 mg Intravenous Q5 Min PRN Cedrick Gomez  MD [place]        Morphine sulfate (PF) injection 2 mg  2 mg Intravenous Q4H PRN Cedrick Gomez MD   2 mg at 02/20/24 1219    And    naloxone (NARCAN) injection 0.4 mg  0.4 mg Intravenous Q5 Min PRN Cedrick Gomez MD        nicotine (NICODERM CQ) 21 MG/24HR patch 1 patch  1 patch Transdermal Q24H Shar Betancourt DO   1 patch at 02/23/24 0815    ondansetron (ZOFRAN) injection 4 mg  4 mg Intravenous Q6H PRN Cedrick Gomez MD        ondansetron ODT (ZOFRAN-ODT) disintegrating tablet 4 mg  4 mg Oral Q6H PRN Cedrick Gomez MD        oxyCODONE-acetaminophen (PERCOCET) 5-325 MG per tablet 1 tablet  1 tablet Oral Q4H PRCedrick Adler MD   1 tablet at 02/21/24 0021    sodium chloride 0.9 % flush 10 mL  10 mL Intravenous PRN Cedrick Gomez MD        sodium chloride 0.9 % flush 10 mL  10 mL Intravenous Q12H Cedrick Gomez MD   10 mL at 02/22/24 2210    sodium chloride 0.9 % flush 10 mL  10 mL Intravenous PRN Cedrick Gomez MD        sodium chloride 0.9 % flush 10 mL  10 mL Intravenous Q12H Cedrick Gomez MD   10 mL at 02/22/24 2210    sodium chloride 0.9 % flush 10 mL  10 mL Intravenous PRN Cedrick Gomez MD        sodium chloride 0.9 % flush 10 mL  10 mL Intravenous Q12H Valerie Ramirez MD   10 mL at 02/23/24 0822    sodium chloride 0.9 % flush 10 mL  10 mL Intravenous PRN Valerie Ramirez MD        sodium chloride 0.9 % infusion 40 mL  40 mL Intravenous PRN Cedrick Gomez MD        sodium chloride 0.9 % infusion 40 mL  40 mL Intravenous PRN Cedrick Gomez MD        sodium chloride 0.9 % infusion  50 mL/hr Intravenous Continuous Shar Betancourt DO 50 mL/hr at 02/23/24 0100 50 mL/hr at 02/23/24 0100     Lab Results (last 24 hours)       Procedure Component Value Units Date/Time    Blood Culture - Blood, Arm, Left [[id number]]  (Normal) Collected: 02/21/24 1252    Specimen: Blood from Arm, Left Updated: 02/23/24 0268      Blood Culture No growth at 2 days    Blood Culture - Blood, Arm, Right [[id number]]  (Normal) Collected: 02/21/24 1247    Specimen: Blood from Arm, Right Updated: 02/23/24 1330     Blood Culture No growth at 2 days    Basic Metabolic Panel [[id number]]  (Abnormal) Collected: 02/23/24 0528    Specimen: Blood Updated: 02/23/24 0605     Glucose 107 mg/dL      BUN 19 mg/dL      Creatinine 0.47 mg/dL      Sodium 137 mmol/L      Potassium 3.5 mmol/L      Chloride 103 mmol/L      CO2 24.6 mmol/L      Calcium 9.1 mg/dL      BUN/Creatinine Ratio 40.4     Anion Gap 9.4 mmol/L      eGFR 94.0 mL/min/1.73     Narrative:      GFR Normal >60  Chronic Kidney Disease <60  Kidney Failure <15    The GFR formula is only valid for adults with stable renal function between ages 18 and 70.    CBC & Differential [[id number]]  (Abnormal) Collected: 02/23/24 0528    Specimen: Blood Updated: 02/23/24 0544    Narrative:      The following orders were created for panel order CBC & Differential.  Procedure                               Abnormality         Status                     ---------                               -----------         ------                     CBC Auto Differential[[id number]]        Abnormal            Final result                 Please view results for these tests on the individual orders.    CBC Auto Differential [[id number]]  (Abnormal) Collected: 02/23/24 0528    Specimen: Blood Updated: 02/23/24 0544     WBC 10.67 10*3/mm3      RBC 3.29 10*6/mm3      Hemoglobin 8.4 g/dL      Hematocrit 27.1 %      MCV 82.4 fL      MCH 25.5 pg      MCHC 31.0 g/dL      RDW 16.4 %      RDW-SD 49.6 fl      MPV 9.3 fL      Platelets 312 10*3/mm3      Neutrophil % 81.0 %      Lymphocyte % 8.2 %      Monocyte % 9.8 %      Eosinophil % 0.4 %      Basophil % 0.1 %      Immature Grans % 0.5 %      Neutrophils, Absolute 8.65 10*3/mm3      Lymphocytes, Absolute 0.87 10*3/mm3      Monocytes, Absolute 1.05 10*3/mm3      Eosinophils, Absolute 0.04  "10*3/mm3      Basophils, Absolute 0.01 10*3/mm3      Immature Grans, Absolute 0.05 10*3/mm3      nRBC 0.0 /100 WBC     TISSUE EXAM, [facility]) [[id number]] Collected: 24 1552    Specimen: Bone from Hip, Left Updated: 24 1610     Reference Lab Report --     [facility]  [address] [place] [address]  Phone: [phone] or [phone]  Fax: [phone]  Yvan Vogt M.D., Medical Director    PATIENT NAME                           LABORATORY NO.  [id number]  MANUEL GARAY                        [id number]  [id number]                         AGE              SEX  N           CLIENT REF #  [facility]            83      1940  F    [id number]   [id number]    [address] [place] [address] BY-PASS                REQUESTING M.EMANUEL     ATTENDING M.D.     COPY TO.  [address]                        NADEGE, LYNETTE VIDALES, [place] [address]                                                        KERLEY, BRIAN  DATE COLLECTED      DATE RECEIVED      DATE REPORTED  2024    DIAGNOSIS:  FEMORAL HEAD, FRACTURE:  Femoral head with reactive changes, clinically fracture  No evidence of malignancy    CLINICAL HISTORY:  Closed hip fracture    SPECIMENS RECEIVED:  FEMORAL HEAD, FRACTURE    MICROSCOPIC DESCRIPTION:  Tissue blocks are prepared and slides are examined microscopically on all  specimens. See diagnosis for details.    Professional interpretation rendered by Ahsan Holly M.D.,F.C.A.P. at [facility] [place], [address] [place] [address].    GROSS DESCRIPTION:  Specimen received in formalin labeled \"hip, left femur head\" and consists of a  60 g, 4.3 cm in diameter by 3.6 cm in length femoral head having an attached 4.0  cm in diameter by 0.8 cm in length portion of femoral neck with a smooth cut  proximal end.  There are focal areas of hemorrhage.  There is a possible area of  fracture " located 1.0 cm from the proximal margin.  The articular surface is tan  and glistening with focal areas of roughened granularity.  The femoral head is  bisected revealing a hemorrhagic proximal end near the area of apparent  fragmentation.  Representative sections are submitted in 2 cassettes after  decalcification with proximal end in A1 and area of fragmentation in A2.  JTM/RLL    REVIEWED, DIAGNOSED AND ELECTRONICALLY  SIGNED BY:    Ahsan Holly M.D.,HANNAH.  CPT CODES:  45355, 11547            Imaging Results (Last 24 Hours)       ** No results found for the last 24 hours. **          Orders (last 24 hrs)        Start     Ordered    02/23/24 0600  CBC Auto Differential  PROCEDURE ONCE         02/22/24 2201    02/22/24 1800  Dietary Nutrition Supplements Mighty Shake  Daily With Dinner       02/22/24 1307    02/22/24 1015  escitalopram (LEXAPRO) tablet 10 mg  Daily         02/22/24 0920    02/22/24 0600  Basic Metabolic Panel  Daily       02/21/24 0820    02/22/24 0600  CBC & Differential  Daily       02/21/24 0820    02/21/24 1900  nicotine (NICODERM CQ) 21 MG/24HR patch 1 patch  Every 24 Hours Scheduled         02/21/24 1805    02/21/24 1714  LORazepam (ATIVAN) injection 0.5 mg  Every 6 Hours PRN         02/21/24 1714    02/21/24 1714  busPIRone (BUSPAR) tablet 10 mg  2 Times Daily PRN         02/21/24 1714    02/21/24 1600  haloperidol lactate (HALDOL) injection 5 mg  Every 6 Hours PRN         02/21/24 1600    02/21/24 1315  cefTRIAXone (ROCEPHIN) IVPB 1000 mg/50ml dextrose (premix)  Every 24 Hours         02/21/24 1215    02/21/24 1200  Dietary Nutrition Supplements Boost VHC  Daily With Lunch       02/21/24 0725    02/21/24 0505  hydrALAZINE (APRESOLINE) injection 10 mg  Every 6 Hours PRN         02/21/24 0506    02/20/24 2200  Instructions on coughing, deep breathing, and incentive spirometry.  Every 4 Hours While Awake       02/20/24 1807    02/20/24 2200  heparin (porcine) 5000 UNIT/ML injection 5,000  Units  Every 8 Hours Scheduled         02/20/24 1808    02/20/24 2100  sodium chloride 0.9 % flush 10 mL  Every 12 Hours Scheduled         02/20/24 1808    02/20/24 2100  sennosides-docusate (PERICOLACE) 8.6-50 MG per tablet 2 tablet  2 Times Daily        See Hyperspace for full Linked Orders Report.    02/20/24 1808    02/20/24 2100  sodium chloride 0.9 % flush 10 mL  Every 12 Hours Scheduled         02/20/24 1807 02/20/24 1900  ethyl alcohol 62 % 2 each  Once         02/20/24 1807 02/20/24 1900  famotidine (PEPCID) tablet 40 mg  Daily         02/20/24 1808    02/20/24 1809  Strict Intake and Output  Every Shift       02/20/24 1808    02/20/24 1809  Inspect Skin Integrity Every Shift; Be Alert for Breakdown  Every Shift       02/20/24 1808    02/20/24 1808  sodium chloride 0.9 % flush 10 mL  As Needed         02/20/24 1808 02/20/24 1808  sodium chloride 0.9 % infusion 40 mL  As Needed         02/20/24 1808    02/20/24 1808  polyethylene glycol (MIRALAX) packet 17 g  Daily PRN        See Hyperspace for full Linked Orders Report.    02/20/24 1808 02/20/24 1808  bisacodyl (DULCOLAX) EC tablet 5 mg  Daily PRN        See Hyperspace for full Linked Orders Report.    02/20/24 1808 02/20/24 1808  bisacodyl (DULCOLAX) suppository 10 mg  Daily PRN        See Hyperspace for full Linked Orders Report.    02/20/24 1808    02/20/24 1808  oxyCODONE-acetaminophen (PERCOCET) 5-325 MG per tablet 1 tablet  Every 4 Hours PRN         02/20/24 1808    02/20/24 1808  Morphine sulfate (PF) injection 1 mg  Every 4 Hours PRN        See Hyperspace for full Linked Orders Report.    02/20/24 1808    02/20/24 1808  naloxone (NARCAN) injection 0.4 mg  Every 5 Minutes PRN        See Hyperspace for full Linked Orders Report.    02/20/24 1808    02/20/24 1808  ondansetron ODT (ZOFRAN-ODT) disintegrating tablet 4 mg  Every 6 Hours PRN         02/20/24 1808    02/20/24 1807  sodium chloride 0.9 % flush 10 mL  As Needed         02/20/24  1807    02/20/24 1300  sodium chloride 0.9 % infusion  Continuous         02/20/24 1205    02/20/24 0900  aspirin EC tablet 81 mg  Daily         02/19/24 1721 02/20/24 0900  losartan (COZAAR) tablet 50 mg  Daily         02/19/24 1721 02/19/24 2100  sodium chloride 0.9 % flush 10 mL  Every 12 Hours Scheduled         02/19/24 1719    02/19/24 2100  cilostazol (PLETAL) tablet 50 mg  2 Times Daily         02/19/24 1721 02/19/24 2100  atorvastatin (LIPITOR) tablet 20 mg  Nightly         02/19/24 1721 02/19/24 2030  budesonide (PULMICORT) nebulizer solution 0.5 mg  2 Times Daily - RT         02/19/24 1722 02/19/24 2000  Vital Signs  Every 4 Hours       02/19/24 1719 02/19/24 1800  Oral Care  2 Times Daily       02/19/24 1719 02/19/24 1800  Incentive Spirometry  Every 4 Hours While Awake       02/19/24 1719 02/19/24 1720  ipratropium-albuterol (DUO-NEB) nebulizer solution 3 mL  4 Times Daily PRN         02/19/24 1721 02/19/24 1719  Morphine sulfate (PF) injection 2 mg  Every 4 Hours PRN        See Hyperspace for full Linked Orders Report.    02/19/24 1719 02/19/24 1719  naloxone (NARCAN) injection 0.4 mg  Every 5 Minutes PRN        See Hyperspace for full Linked Orders Report.    02/19/24 1719 02/19/24 1718  Intake & Output  Every Shift       02/19/24 1719 02/19/24 1717  sodium chloride 0.9 % flush 10 mL  As Needed         02/19/24 1719 02/19/24 1717  sodium chloride 0.9 % infusion 40 mL  As Needed         02/19/24 1719    02/19/24 1717  acetaminophen (TYLENOL) tablet 650 mg  Every 4 Hours PRN        See Hyperspace for full Linked Orders Report.    02/19/24 1719    02/19/24 1717  acetaminophen (TYLENOL) 160 MG/5ML oral solution 650 mg  Every 4 Hours PRN        See Hyperspace for full Linked Orders Report.    02/19/24 1719 02/19/24 1717  acetaminophen (TYLENOL) suppository 650 mg  Every 4 Hours PRN        See Hyperspace for full Linked Orders Report.    02/19/24 1719    02/19/24  1717  ondansetron (ZOFRAN) injection 4 mg  Every 6 Hours PRN         24 1719    24 1446  sodium chloride 0.9 % flush 10 mL  As Needed        See Hyperspace for full Linked Orders Report.    24 1447    Unscheduled  Up With Assistance  As Needed       24 1719    Unscheduled  Oxygen Therapy- Nasal Cannula; 2 LPM  Continuous PRN       24 1807    Unscheduled  Pulse Oximetry, Continuous  Continuous PRN       24 1807    Unscheduled  Oxygen Therapy- Nasal Cannula; Titrate 1-6 LPM Per SpO2; 90 - 95%  Continuous PRN       24 1807    --  SCANNED - TELEMETRY           24 0000    --  SCANNED - TELEMETRY           24 0000    --  SCANNED - TELEMETRY           24 0000    --  escitalopram (LEXAPRO) 10 MG tablet  Daily         24 1135    --  SCANNED - TELEMETRY           24 0000    --  SCANNED - TELEMETRY           24 0000    --  SCANNED - TELEMETRY           24 0000    --  SCANNED - TELEMETRY           24 0000    --  SCANNED - TELEMETRY           24 0000    --  SCANNED - TELEMETRY           24 0000    --  SCANNED - TELEMETRY           24 0000                     Physician Progress Notes (most recent note)        Kerley, Brian Joseph, DO at 24 1519              [facility]    Name:  Tracey Nelson   Age:  83 y.o.  Sex:  female  :  1940  MRN:  [id number]   Visit Number:  [id number]  Admission Date:  2024  Date Of Service:  24  Primary Care Physician:  Leola Wright MD     LOS: 3 days :    Chief Complaint:      Follow-up; hip pain    Subjective:    Feels a lot better this morning, does remember getting surgery.  Still having some pain in her hip but it is manageable.  Rested well overnight.    Hospital Course:    Tracey Nelson is an 83-year-old female with history of coronary artery disease, left bundle branch block, hypertension, peripheral artery  disease was brought to the emergency room by her friend after she sustained a fall and developed left hip pain.  Patient was with her friend who brought her to the emergency room.  Patient apparently was going to get an echocardiogram done at [place] and her friend was driving but this happened while she was getting into the car.  Patient apparently hit her left forehead as well.  Patient states that she does have peripheral arterial disease and takes aspirin and has had stents in her legs.  She denies any prior history of coronary artery disease or coronary stents.  At time of admission only reporting concern of mild headache and left hip pain.     In the emergency room, she was afebrile and hemodynamically stable except for elevated blood pressure of 164/58.  Initial pulse oxygen saturation was 94% on room air but she subsequently dropped to 87% and had to be placed on 2 L of nasal cannula oxygen.  Blood work including CMP and CBC was unremarkable except for a sodium of 132 and a hemoglobin of 9.2 (baseline 11).  INR 1.19.  CT of the head and CT of the cervical spine were unremarkable.  Chest x-ray showed abnormal retrocardiac density left lung base which she has been following up with Dr. Ramirez.  X-ray of the hip with pelvis showed left subcapital femoral neck fracture.  Patient's condition was discussed with Dr. Gomez and the patient was subsequently admitted to the medical floor for treatment of left hip fracture.    Review of Systems:     All systems were reviewed and negative except as mentioned in subjective, assessment and plan.    Vital Signs:    Temp:  [97.3 °F (36.3 °C)-98.5 °F (36.9 °C)] 98.1 °F (36.7 °C)  Heart Rate:  [63-98] 89  Resp:  [16-22] 17  BP: (132-167)/(46-77) 161/63    Intake and output:    I/O last 3 completed shifts:  In: 2130 [P.O.:920; I.V.:1210]  Out: 1725 [Urine:1725]  I/O this shift:  In: 360 [P.O.:360]  Out: 120 [Urine:120]    Physical Examination:    General Appearance:  Alert  "and appropriately cooperative.   Head:  Atraumatic and normocephalic.   Eyes: Conjunctivae and sclerae normal, no icterus. No pallor.   Throat: No oral lesions, no thrush, oral mucosa moist.   Neck: Supple, trachea midline, no thyromegaly.   Lungs:   Breath sounds heard bilaterally equally.  No wheezing or crackles. No Pleural rub or bronchial breathing.   Heart:  Normal S1 and S2, no murmur, no gallop, no rub. No JVD.   Abdomen:   Normal bowel sounds, no masses, no organomegaly. Soft, nontender, nondistended, no rebound tenderness.   Extremities: Supple, no edema, no cyanosis, no clubbing.   Skin: Warm.   Neurologic: Appropriately conversational.  No facial asymmetry. Moves all four limbs. No tremors.      Laboratory results:    Results from last 7 days   Lab Units 02/22/24  0421 02/21/24  0418 02/20/24  0523   SODIUM mmol/L 136 133* 133*   POTASSIUM mmol/L 3.4* 4.0 3.8   CHLORIDE mmol/L 99 98 98   CO2 mmol/L 25.7 21.4* 24.0   BUN mg/dL 16 17 9   CREATININE mg/dL 0.53* 0.66 0.50*   CALCIUM mg/dL 9.2 9.8 9.5   GLUCOSE mg/dL 140* 262* 115*     Results from last 7 days   Lab Units 02/22/24  0421 02/21/24  0418 02/20/24  0523   WBC 10*3/mm3 15.86* 12.08* 8.67   HEMOGLOBIN g/dL 8.4* 11.0* 9.8*   HEMATOCRIT % 27.8* 36.8 32.9*   PLATELETS 10*3/mm3 343 425 351     Results from last 7 days   Lab Units 02/19/24  1546   INR  1.19*     Results from last 7 days   Lab Units 02/20/24  0707 02/20/24  0523   HSTROP T ng/L 18* 19*     Results from last 7 days   Lab Units 02/21/24  1252 02/21/24  1247   BLOODCX  No growth at 24 hours No growth at 24 hours     No results for input(s): \"PHART\", \"LSM8QQV\", \"PO2ART\", \"TJT6BIL\", \"BASEEXCESS\" in the last 8760 hours.   I have reviewed the patient's laboratory results.    Radiology results:    XR Hip 1 View Without Pelvis Left (Surgery Only)    Result Date: 2/20/2024  PROCEDURE: XR HIP 1 VIEW WO PELVIS LEFT-  1 VIEW  HISTORY: left hip replacement; S72.002A-Fracture of unspecified part of " neck of left femur, initial encounter for closed fracture; S72.009A-Fracture of unspecified part of neck of unspecified femur, initial encounter for closed fracture  COMPARISON: Preoperative exam 02/19/2024.  FINDINGS:  One view shows intraoperative view acquired during left hip arthroplasty procedure. Femoral prosthesis is noted. There has been resection of the left femoral head. Right hip joint is obscured.      Impression: Limited intraoperative imaging acquired during left hip arthroplasty procedure.     This report was signed and finalized on 2/20/2024 8:46 PM by Ahsan Hui MD.      XR Chest 1 View    Result Date: 2/20/2024  FINAL REPORT TECHNIQUE: An AP portable view of the chest was obtained. CLINICAL HISTORY: desating rapidly FINDINGS: There is moderate cardiomegaly.  There is left lower lobe consolidation and a small left pleural effusion.  There is no pneumothorax.. There is no adenopathy or significant osseous abnormality.     Impression: Left lower lobe consolidation worrisome for pneumonia.  Small left pleural effusion. Authenticated and Electronically Signed by Robby Lancaster M.D. on 02/20/2024 07:17:34 PM    XR Hip With or Without Pelvis 2 - 3 View Left    Result Date: 2/20/2024  FINAL REPORT TECHNIQUE: A single view of the left hip was obtained. CLINICAL HISTORY: s/p L hip sarina FINDINGS: There are postoperative changes of left hip sarina-arthroplasty. Orthopedic hardware is intact and normally articulated.  There is no fracture.  There are vascular calcifications.  The right hip is unremarkable.     Impression: Vascular calcifications and expected postoperative findings. Authenticated and Electronically Signed by Robby Lancaster M.D. on 02/20/2024 07:17:03 PM    CT Angiogram Chest Pulmonary Embolism    Result Date: 2/20/2024  FINAL REPORT TECHNIQUE: Thin section axial images were obtained through the chest and during the arterial phase of IV contrast administration. Coronal 3-D MIP reconstructed images were  also provided. CLINICAL HISTORY: Pulmonary embolism (PE) suspected, neg D-dimer soa, ams, s/p hip replacement in recovery FINDINGS: The pulmonary arteries are well-opacified and there is no filling defect to indicate pulmonary embolism. The thoracic aorta is normal.  There is a small to moderate pericardial effusion.  There is dense left lower lobe consolidation consistent with pneumonia.  There is a small left pleural effusion and mild right lung atelectasis.  There is mild emphysema.  There is no adenopathy or significant osseous abnormality.     Impression: No pulmonary embolism. Left lower lobe pneumonia with small left pleural effusion. Small to moderate pericardial effusion. Authenticated and Electronically Signed by Robby Lancaster M.D. on 02/20/2024 07:13:07 PM   I have reviewed the patient's radiology reports.    Medication Review:     I have reviewed the patient's active and prn medications.     Problem List:      Closed left hip fracture    Peripheral artery disease      Assessment:     Status post mechanical fall and left closed hip fracture, POA.  Peripheral artery disease with previous peripheral stents.  Coronary artery disease on medical therapy.  Essential hypertension.  COPD, no exacerbation.     Plan:     Comfortable in bed, pleasantly conversational.  Delirium appears to have resolved.    Sister and Son updated during course.    Delirium, resolved  Urinary retention  Resolved as of morning 2/22.  Overnight did have over 900 mL urinary retention, this may have exacerbated her delirium.  Continue to monitor in the ICU.  Fall precautions.  Haldol as needed.  Do not suspect stroke. Family denies history of dementia.    Left lower lobe pneumonia  Rocephin.  May have aspiration.    Left hip fracture.  - Continue morphine, Lortab and Zofran for symptomatic control.  - s/p ORIF 2/20  - Cardiology assessed moderate risk, no contraindications to surgery.     PAD/CAD  - Continue home medications including  aspirin, simvastatin.     COPD.  - No evidence of exacerbation.  - Continue bronchodilators as needed.     Risk Assessment: Moderate  DVT Prophylaxis: Lovenox  Code Status: Full  Diet: Cardiac  Discharge Plan: At least 1-2 more days, pending PT/OT    Brian Joseph Kerley, DO  02/22/24  15:19 EST    Dictated utilizing Dragon dictation.      Electronically signed by Kerley, Brian Joseph, DO at 02/22/24 1525          Consult Notes (most recent note)        Rodger Freeman CRNA at 02/21/24 0824        Consult Orders    1. Anesthesia Follow-Up [[id number]] ordered by Dean Berger CRNA at 02/20/24 1550                 Patient: Tracey Nelson  Procedure(s):  HIP BIPOLAR REPLACEMENT LEFT  Anesthesia type: general with block    Patient location: ICU  Last vitals:   Vitals:    02/21/24 0730   BP:    Pulse:    Resp:    Temp: 97.9 °F (36.6 °C)   SpO2: 95%     Level of consciousness: awake, alert, and oriented    Post-anesthesia pain: adequate analgesia  Airway patency: patent  Respiratory: unassisted, nasal cannula  Cardiovascular: stable and blood pressure at baseline  Hydration: euvolemic    Anesthetic complications: no    Electronically signed by Rodger Freeman CRNA at 02/21/24 0402

## 2024-02-23 NOTE — PLAN OF CARE
Goal Outcome Evaluation:  Plan of Care Reviewed With: patient           Outcome Evaluation: Pt resting comfortably in bed. VSS, alert to self only. family at bedside. No acute events occured during shift. Pt denied sob and pain. Plan of care ongoing. Abductor pillow/wedge used. pt and family expresses no further needs or concerns at his time, call light within reach.

## 2024-02-24 ENCOUNTER — READMISSION MANAGEMENT (OUTPATIENT)
Dept: CALL CENTER | Facility: HOSPITAL | Age: 84
End: 2024-02-24
Payer: MEDICARE

## 2024-02-24 ENCOUNTER — HOME HEALTH ADMISSION (OUTPATIENT)
Dept: HOME HEALTH SERVICES | Facility: HOME HEALTHCARE | Age: 84
End: 2024-02-24
Payer: MEDICARE

## 2024-02-24 VITALS
HEART RATE: 104 BPM | RESPIRATION RATE: 18 BRPM | BODY MASS INDEX: 16.9 KG/M2 | HEIGHT: 65 IN | DIASTOLIC BLOOD PRESSURE: 67 MMHG | SYSTOLIC BLOOD PRESSURE: 178 MMHG | OXYGEN SATURATION: 94 % | WEIGHT: 101.41 LBS | TEMPERATURE: 98.5 F

## 2024-02-24 LAB
ANION GAP SERPL CALCULATED.3IONS-SCNC: 9.5 MMOL/L (ref 5–15)
BASOPHILS # BLD AUTO: 0.02 10*3/MM3 (ref 0–0.2)
BASOPHILS NFR BLD AUTO: 0.2 % (ref 0–1.5)
BUN SERPL-MCNC: 16 MG/DL (ref 8–23)
BUN/CREAT SERPL: 36.4 (ref 7–25)
CALCIUM SPEC-SCNC: 9.1 MG/DL (ref 8.6–10.5)
CHLORIDE SERPL-SCNC: 101 MMOL/L (ref 98–107)
CO2 SERPL-SCNC: 25.5 MMOL/L (ref 22–29)
CREAT SERPL-MCNC: 0.44 MG/DL (ref 0.57–1)
DEPRECATED RDW RBC AUTO: 49.5 FL (ref 37–54)
EGFRCR SERPLBLD CKD-EPI 2021: 95.5 ML/MIN/1.73
EOSINOPHIL # BLD AUTO: 0.04 10*3/MM3 (ref 0–0.4)
EOSINOPHIL NFR BLD AUTO: 0.5 % (ref 0.3–6.2)
ERYTHROCYTE [DISTWIDTH] IN BLOOD BY AUTOMATED COUNT: 16.7 % (ref 12.3–15.4)
GLUCOSE SERPL-MCNC: 116 MG/DL (ref 65–99)
HCT VFR BLD AUTO: 27.6 % (ref 34–46.6)
HGB BLD-MCNC: 8.7 G/DL (ref 12–15.9)
IMM GRANULOCYTES # BLD AUTO: 0.04 10*3/MM3 (ref 0–0.05)
IMM GRANULOCYTES NFR BLD AUTO: 0.5 % (ref 0–0.5)
LYMPHOCYTES # BLD AUTO: 0.91 10*3/MM3 (ref 0.7–3.1)
LYMPHOCYTES NFR BLD AUTO: 10.5 % (ref 19.6–45.3)
MCH RBC QN AUTO: 25.6 PG (ref 26.6–33)
MCHC RBC AUTO-ENTMCNC: 31.5 G/DL (ref 31.5–35.7)
MCV RBC AUTO: 81.2 FL (ref 79–97)
MONOCYTES # BLD AUTO: 0.92 10*3/MM3 (ref 0.1–0.9)
MONOCYTES NFR BLD AUTO: 10.7 % (ref 5–12)
NEUTROPHILS NFR BLD AUTO: 6.7 10*3/MM3 (ref 1.7–7)
NEUTROPHILS NFR BLD AUTO: 77.6 % (ref 42.7–76)
NRBC BLD AUTO-RTO: 0 /100 WBC (ref 0–0.2)
PLATELET # BLD AUTO: 315 10*3/MM3 (ref 140–450)
PMV BLD AUTO: 9.3 FL (ref 6–12)
POTASSIUM SERPL-SCNC: 3.5 MMOL/L (ref 3.5–5.2)
RBC # BLD AUTO: 3.4 10*6/MM3 (ref 3.77–5.28)
SODIUM SERPL-SCNC: 136 MMOL/L (ref 136–145)
WBC NRBC COR # BLD AUTO: 8.63 10*3/MM3 (ref 3.4–10.8)

## 2024-02-24 PROCEDURE — 80048 BASIC METABOLIC PNL TOTAL CA: CPT | Performed by: STUDENT IN AN ORGANIZED HEALTH CARE EDUCATION/TRAINING PROGRAM

## 2024-02-24 PROCEDURE — 25010000002 HEPARIN (PORCINE) PER 1000 UNITS: Performed by: ORTHOPAEDIC SURGERY

## 2024-02-24 PROCEDURE — 85025 COMPLETE CBC W/AUTO DIFF WBC: CPT | Performed by: STUDENT IN AN ORGANIZED HEALTH CARE EDUCATION/TRAINING PROGRAM

## 2024-02-24 PROCEDURE — 99239 HOSP IP/OBS DSCHRG MGMT >30: CPT | Performed by: STUDENT IN AN ORGANIZED HEALTH CARE EDUCATION/TRAINING PROGRAM

## 2024-02-24 RX ORDER — OXYCODONE HYDROCHLORIDE AND ACETAMINOPHEN 5; 325 MG/1; MG/1
1 TABLET ORAL EVERY 4 HOURS PRN
Qty: 18 TABLET | Refills: 0 | Status: SHIPPED | OUTPATIENT
Start: 2024-02-24 | End: 2024-02-27

## 2024-02-24 RX ADMIN — FAMOTIDINE 40 MG: 20 TABLET, FILM COATED ORAL at 09:12

## 2024-02-24 RX ADMIN — CILOSTAZOL 50 MG: 100 TABLET ORAL at 09:11

## 2024-02-24 RX ADMIN — DOCUSATE SODIUM 50MG AND SENNOSIDES 8.6MG 2 TABLET: 8.6; 5 TABLET, FILM COATED ORAL at 09:11

## 2024-02-24 RX ADMIN — HEPARIN SODIUM 5000 UNITS: 5000 INJECTION, SOLUTION INTRAVENOUS; SUBCUTANEOUS at 06:03

## 2024-02-24 RX ADMIN — ASPIRIN 81 MG: 81 TABLET, COATED ORAL at 09:12

## 2024-02-24 RX ADMIN — LOSARTAN POTASSIUM 50 MG: 50 TABLET, FILM COATED ORAL at 09:12

## 2024-02-24 RX ADMIN — ESCITALOPRAM OXALATE 10 MG: 10 TABLET ORAL at 09:12

## 2024-02-24 RX ADMIN — Medication 10 ML: at 09:11

## 2024-02-24 RX ADMIN — Medication 1 PATCH: at 09:11

## 2024-02-24 NOTE — DISCHARGE SUMMARY
[facility]   DISCHARGE SUMMARY      Name:  Tracey Nelson   Age:  83 y.o.  Sex:  female  :  1940  MRN:  [id number]   Visit Number:  [id number]    Admission Date:  2024  Date of Discharge:  2024  Primary Care Physician:  Leola Wright MD    Important issues to note:    -Home with home health care PT/OT.  -Follow-up with orthopedics.  Follow-up with PCP.    Discharge Diagnoses:     Hip fracture status post ORIF  Delirium, resolved  Pneumonia, resolved      Problem List:     Active Hospital Problems    Diagnosis  POA    **Closed left hip fracture [S72.002A]  Yes    Severe malnutrition [E43]  Yes    Peripheral artery disease [I73.9]  Yes      Resolved Hospital Problems   No resolved problems to display.     Presenting Problem:    Chief Complaint   Patient presents with    Fall      Consults:     Consulting Physician(s)         Provider   Role Specialty     Antony Burton MD  Consulting Physician Cardiology     Cedrick Gomez MD  Consulting Physician Orthopedic Surgery          Procedures Performed:    Procedure(s):  HIP BIPOLAR REPLACEMENT LEFT    History of presenting illness/Hospital Course:    Tracey Nelson is an 83-year-old female with history of coronary artery disease, left bundle branch block, hypertension, peripheral artery disease was brought to the emergency room by her friend after she sustained a fall and developed left hip pain.  Patient was with her friend who brought her to the emergency room.  Patient apparently was going to get an echocardiogram done at [place] and her friend was driving but this happened while she was getting into the car.  Patient apparently hit her left forehead as well.  Patient states that she does have peripheral arterial disease and takes aspirin and has had stents in her legs.  She denies any prior history of coronary artery disease or coronary stents.  At time of admission only reporting concern of mild  headache and left hip pain.     In the emergency room, she was afebrile and hemodynamically stable except for elevated blood pressure of 164/58.  Initial pulse oxygen saturation was 94% on room air but she subsequently dropped to 87% and had to be placed on 2 L of nasal cannula oxygen.  Blood work including CMP and CBC was unremarkable except for a sodium of 132 and a hemoglobin of 9.2 (baseline 11).  INR 1.19.  CT of the head and CT of the cervical spine were unremarkable.  Chest x-ray showed abnormal retrocardiac density left lung base which she has been following up with Dr. Ramirez.  X-ray of the hip with pelvis showed left subcapital femoral neck fracture.  Patient's condition was discussed with Dr. Gomez and the patient was subsequently admitted to the medical floor for treatment of left hip fracture.    Status post ORIF left hip.  While in PACU developed significant hypoxia requiring 20 L.  Improved overnight, was able to be titrated off of oxygen.  X-ray showed possible left lower lobe pneumonia.  Provided short course of Rocephin.  No cough or shortness of air.  During course did develop delirium, secondary to urinary retention.  Bladder was decompressed, delirium resolved.  Worked well with therapy, she decided for home with home health, did not want to go to short-term rehab.    -Home with home health care PT/OT.  -Follow-up with orthopedics.  Follow-up with PCP.    Vital Signs:    Temp:  [97 °F (36.1 °C)-98.5 °F (36.9 °C)] 98.5 °F (36.9 °C)  Heart Rate:  [] 104  Resp:  [18-22] 18  BP: (158-180)/(58-73) 178/67    Physical Exam:    General Appearance:  Alert and cooperative.    Head:  Atraumatic and normocephalic.   Eyes: Conjunctivae and sclerae normal, no icterus. No pallor.   Ears:  Ears with no abnormalities noted.   Throat: No oral lesions, no thrush, oral mucosa moist.   Neck: Supple, trachea midline, no thyromegaly.   Back:   No kyphoscoliosis present. No tenderness to palpation.   Lungs:    Breath sounds heard bilaterally equally.  No crackles or wheezing. No Pleural rub or bronchial breathing.   Heart:  Normal S1 and S2, no murmur, no gallop, no rub. No JVD.   Abdomen:   Normal bowel sounds, no masses, no organomegaly. Soft, nontender, nondistended, no rebound tenderness.   Extremities: Supple, no edema, no cyanosis, no clubbing.   Pulses: Pulses palpable bilaterally.   Skin: Warm.   Neurologic: Alert and oriented x 3. No facial asymmetry. Moves all four limbs. No tremors.     Pertinent Lab Results:     Results from last 7 days   Lab Units 02/24/24  0523 02/23/24  0528 02/22/24  0421   SODIUM mmol/L 136 137 136   POTASSIUM mmol/L 3.5 3.5 3.4*   CHLORIDE mmol/L 101 103 99   CO2 mmol/L 25.5 24.6 25.7   BUN mg/dL 16 19 16   CREATININE mg/dL 0.44* 0.47* 0.53*   CALCIUM mg/dL 9.1 9.1 9.2   GLUCOSE mg/dL 116* 107* 140*     Results from last 7 days   Lab Units 02/24/24  0523 02/23/24  0528 02/22/24  0421   WBC 10*3/mm3 8.63 10.67 15.86*   HEMOGLOBIN g/dL 8.7* 8.4* 8.4*   HEMATOCRIT % 27.6* 27.1* 27.8*   PLATELETS 10*3/mm3 315 312 343     Results from last 7 days   Lab Units 02/19/24  1546   INR  1.19*     Results from last 7 days   Lab Units 02/20/24  0707 02/20/24  0523   HSTROP T ng/L 18* 19*                     Results from last 7 days   Lab Units 02/21/24  1252 02/21/24  1247   BLOODCX  No growth at 2 days No growth at 2 days       Pertinent Radiology Results:    Imaging Results (All)       Procedure Component Value Units Date/Time    XR Hip 1 View Without Pelvis Left (Surgery Only) [[id number]] Collected: 02/20/24 1710     Updated: 02/20/24 2048    Narrative:      PROCEDURE: XR HIP 1 VIEW WO PELVIS LEFT-     1 VIEW     HISTORY: left hip replacement; S72.002A-Fracture of unspecified part of  neck of left femur, initial encounter for closed fracture;  S72.009A-Fracture of unspecified part of neck of unspecified femur,  initial encounter for closed fracture     COMPARISON: Preoperative exam 02/19/2024.      FINDINGS:  One view shows intraoperative view acquired during left hip  arthroplasty procedure. Femoral prosthesis is noted. There has been  resection of the left femoral head. Right hip joint is obscured.       Impression:      Limited intraoperative imaging acquired during left hip  arthroplasty procedure.              This report was signed and finalized on 2/20/2024 8:46 PM by Ahsan Hui MD.       XR Chest 1 View [[id number]] Collected: 02/20/24 1831     Updated: 02/20/24 1918    Narrative:      FINAL REPORT    TECHNIQUE:  An AP portable view of the chest was obtained.    CLINICAL HISTORY:  desating rapidly    FINDINGS:  There is moderate cardiomegaly.  There is left lower lobe  consolidation and a small left pleural effusion.  There is no  pneumothorax.. There is no adenopathy or significant osseous  abnormality.      Impression:      Left lower lobe consolidation worrisome for pneumonia.  Small  left pleural effusion.    Authenticated and Electronically Signed by Robby Lancaster M.D. on  02/20/2024 07:17:34 PM    XR Hip With or Without Pelvis 2 - 3 View Left [[id number]] Collected: 02/20/24 1830     Updated: 02/20/24 1918    Narrative:      FINAL REPORT    TECHNIQUE:  A single view of the left hip was obtained.    CLINICAL HISTORY:  s/p L hip sarina    FINDINGS:  There are postoperative changes of left hip sarina-arthroplasty.  Orthopedic hardware is intact and normally articulated.  There  is no fracture.  There are vascular calcifications.  The right  hip is unremarkable.      Impression:      Vascular calcifications and expected postoperative findings.    Authenticated and Electronically Signed by Robby Lancaster M.D. on  02/20/2024 07:17:03 PM    CT Angiogram Chest Pulmonary Embolism [[id number]] Collected: 02/20/24 1832     Updated: 02/20/24 1914    Narrative:      FINAL REPORT    TECHNIQUE:  Thin section axial images were obtained through the chest and  during the arterial phase of IV contrast administration.  Coronal  3-D MIP reconstructed images were also provided.    CLINICAL HISTORY:  Pulmonary embolism (PE) suspected, neg D-dimer    soa, ams, s/p hip replacement in recovery    FINDINGS:  The pulmonary arteries are well-opacified and there is no  filling defect to indicate pulmonary embolism. The thoracic  aorta is normal.  There is a small to moderate pericardial  effusion.  There is dense left lower lobe consolidation  consistent with pneumonia.  There is a small left pleural  effusion and mild right lung atelectasis.  There is mild  emphysema.  There is no adenopathy or significant osseous  abnormality.      Impression:      No pulmonary embolism.    Left lower lobe pneumonia with small left pleural effusion.    Small to moderate pericardial effusion.    Authenticated and Electronically Signed by Robby Lancaster M.D. on  02/20/2024 07:13:07 PM    XR Chest 1 View [[id number]] Collected: 02/19/24 1521     Updated: 02/19/24 1529    Narrative:      PROCEDURE: XR CHEST 1 VW-     HISTORY: Preoperative for respiratory clearance.     COMPARISON: 10/16/2022 and 12/24/2023.     FINDINGS:  Portable view of the chest demonstrates chronic changes right  infrahilar region. There is underlying emphysema. Retrocardiac density  left lung base is noted new from prior exam which may represent  pneumonia although mass is not excluded. Small left pleural effusion is  suspected. The mediastinum is unremarkable.     The heart size is normal.       Impression:      Abnormal retrocardiac density left lung base. Given  chronicity consider contrast-enhanced chest CT to further characterize.           This report was signed and finalized on 2/19/2024 3:27 PM by Ahsan Hui MD.       XR Hip With or Without Pelvis 2 - 3 View Left [[id number]] Collected: 02/19/24 1437     Updated: 02/19/24 1439    Narrative:      PROCEDURE: XR HIP W OR WO PELVIS 2-3 VIEW LEFT-     HISTORY: trauma, pain     FINDINGS:  Two views show fracture of the subcapital left  femoral neck.   There is mild impaction and displacement.     The joint spaces appear normal.          Impression:      Subcapital left femoral neck fracture as above.           This report was signed and finalized on 2/19/2024 2:37 PM by Ahsan Hui MD.       CT Cervical Spine Without Contrast [[id number]] Collected: 02/19/24 1427     Updated: 02/19/24 1430    Narrative:      PROCEDURE: CT CERVICAL SPINE WO CONTRAST-     HISTORY: Neck trauma (Age >= 65y), neck pain     TECHNIQUE: Thin section axial CT with sagittal and coronal  reconstructions     FINDINGS: No fracture is present. Retrolisthesis C3-4 measures 3.5 mm.  Remaining alignment is normal. Moderate diffuse degenerative disc  disease is noted. Canal stenosis is seen C4-5. There is multilevel bony  neural foraminal narrowing..       Impression:      No fracture        This study was performed with techniques to keep radiation doses as low  as reasonably achievable (ALARA). Individualized dose reduction  techniques using automated exposure control or adjustment of vA and/or  kV according to the patient size were employed.      This report was signed and finalized on 2/19/2024 2:28 PM by Ahsan Hui MD.       CT Head Without Contrast [[id number]] Collected: 02/19/24 1426     Updated: 02/19/24 1429    Narrative:      PROCEDURE: CT HEAD WO CONTRAST-     HISTORY: Head trauma, minor (Age >= 65y)     TECHNIQUE: Noncontrast exam     FINDINGS: Moderate atrophy and chronic ischemic white matter changes are  noted.     No cortical edema is present. There is no mass or hemorrhage. Ventricles  are normal.     Bone windows show no skull fracture or obvious destructive lesion.       Impression:      1. No acute intracranial abnormality or obvious mass.   2. Atrophy and chronic ischemic white matter changes as above.        This study was performed with techniques to keep radiation doses as low  as reasonably achievable (ALARA). Individualized dose reduction  techniques  using automated exposure control or adjustment of vA and/or  kV according to the patient size were employed.            This report was signed and finalized on 2/19/2024 2:27 PM by Ahsan Hui MD.               Echo:    Results for orders placed during the hospital encounter of 02/19/24    Adult Transthoracic Echo Complete W/ Cont if Necessary Per Protocol    Interpretation Summary    Left ventricular systolic function is normal. Calculated left ventricular 3D EF = 55% Left ventricular ejection fraction appears to be 56 - 60%.    Grade II diastolic dysfunction. Left ventricular wall thickness is consistent with mild concentric hypertrophy.    Normal right ventricular size and function.    The right atrial cavity is dilated.    Mild tricuspid valve regurgitation is present. Estimated right ventricular systolic pressure from tricuspid regurgitation is moderately elevated (45-55 mmHg).    There is a moderate (1-2cm) circumferential pericardial effusion measuring largest anteriorly along the RV free wall at 1.6 cm. There is no echocardiographic evidence of cardiac tamponade.    Condition on Discharge:      Stable.    Code status during the hospital stay:    Code Status and Medical Interventions:   Ordered at: 02/19/24 1719     Code Status (Patient has no pulse and is not breathing):    CPR (Attempt to Resuscitate)     Medical Interventions (Patient has pulse or is breathing):    Full Support     Discharge [place] [facility] [place] Medications:       Discharge Medications        New Medications        Instructions Start Date   oxyCODONE-acetaminophen 5-325 MG per tablet  Commonly known as: PERCOCET   1 tablet, Oral, Every 4 Hours PRN             Continue These Medications        Instructions Start Date   amLODIPine 2.5 MG tablet  Commonly known as: NORVASC   every night at bedtime.      Aspirin 81 MG capsule   Take 1 tablet by mouth Daily.      benzonatate 200 MG capsule  Commonly known as:  TESSALON   200 mg, Oral, 3 Times Daily PRN      busPIRone 10 MG tablet  Commonly known as: BUSPAR   buspirone 10 mg tablet   Two times a day as needed      cilostazol 50 MG tablet  Commonly known as: PLETAL   1 tablet, Oral, 2 Times Daily      cyanocobalamin 250 MCG tablet  Commonly known as: VITAMIN B-12   Vitamin B-12 250 mcg tablet   Take 1 Daily      escitalopram 10 MG tablet  Commonly known as: LEXAPRO   10 mg, Oral, Daily      losartan 50 MG tablet  Commonly known as: COZAAR   Take 1 tablet by mouth Daily.      simvastatin 40 MG tablet  Commonly known as: ZOCOR   1 tablet, Oral, Every Night at Bedtime      Trelegy Ellipta 200-62.5-25 MCG/ACT inhaler  Generic drug: Fluticasone-Umeclidin-Vilant   Inhalation, Daily With Lunch             Stop These Medications      azithromycin 250 MG tablet  Commonly known as: ZITHROMAX     Cholecalciferol 50 MCG (2000 UT) tablet     doxazosin 2 MG tablet  Commonly known as: CARDURA     furosemide 20 MG tablet  Commonly known as: LASIX     ipratropium-albuterol 0.5-2.5 mg/3 ml nebulizer  Commonly known as: DUO-NEB     lisinopril 30 MG tablet  Commonly known as: PRINIVIL,ZESTRIL     metoprolol tartrate 50 MG tablet  Commonly known as: LOPRESSOR     mirtazapine 15 MG tablet  Commonly known as: REMERON     Nebulizer device     Vitamin D3 125 MCG (5000 UT) tablet dispersible            Discharge Diet:     Diet Instructions       Diet: Cardiac Diets; Healthy Heart (2-3 Na+); Regular Texture (IDDSI 7); Thin (IDDSI 0)      Discharge Diet: Cardiac Diets    Cardiac Diet: Healthy Heart (2-3 Na+)    Texture: Regular Texture (IDDSI 7)    Fluid Consistency: Thin (IDDSI 0)          Activity at Discharge:     Activity Instructions       Activity as Tolerated            Follow-up Appointments:    Additional Instructions for the Follow-ups that You Need to Schedule       Ambulatory Referral to Home Health (Hospital)   As directed      Face to Face Visit Date: 2/23/2024   Follow-up provider for  Plan of Care?: I treated the patient in an acute care facility and will not continue treatment after discharge.   Follow-up provider: LEOLA WRIGHT [[id number]]   Reason/Clinical Findings: Status post hip replacement   Describe mobility limitations that make leaving home difficult: Impaired mobility and ADLs   Nursing/Therapeutic Services Requested: Physical Therapy Occupational Therapy   PT orders: Total joint pathway Therapeutic exercise Gait Training Transfer training Home safety assessment Strengthening   Weight Bearing Status: As Tolerated   Occupational orders: Activities of daily living Home safety assessment Energy conservation Strengthening Cognition Fine motor   Frequency: 1 Week 1               Contact information for follow-up providers       Leola Wright MD Follow up in 3 day(s).    Specialty: Internal Medicine  Contact information:  [address] [facility] [address]  [phone]               Cedrick Gomez MD Follow up on 3/5/2024.    Specialty: Orthopedic Surgery  Contact information:  [address]  [facility] [address]  [phone]                       Contact information for after-discharge care       Durable Medical Equipment       [facility] .    Service: Durable Medical Equipment  Contact information:  [address]  [facility] [address]  [phone]                     Home Medical Care       [facility] .    Service: Home Health Services  Contact information:  [address] [facility] [address] [phone]                                 No future appointments.  Test Results Pending at Discharge:    Pending Labs       Order Current Status    Blood Culture - Blood, Arm, Left Preliminary result    Blood Culture - Blood, Arm, Right Preliminary result               Brian Joseph Kerley, DO  02/24/24  09:28 EST    Time: I spent 35 minutes on this discharge activity which included: face-to-face encounter  with the patient, reviewing the data in the system, coordination of the care with the nursing staff as well as consultants, documentation, and entering orders.     Dictated utilizing Dragon dictation.

## 2024-02-24 NOTE — OUTREACH NOTE
Prep Survey      Flowsheet Row Responses   [facility] discharged from? [facility]   Is LACE score < 7 ? No   Eligibility Readm Mgmt   Discharge diagnosis Hip bipolar replacement left   Does the patient have one of the following disease processes/diagnoses(primary or secondary)? Total Joint Replacement   Does the patient have Home health ordered? Yes   What is the Home health agency?  [facility]   Is there a DME ordered? Yes   What DME was ordered? [facility] for [facility] (delivered)   Prep survey completed? Yes            TITA ELLIS - Registered Nurse

## 2024-02-24 NOTE — CASE MANAGEMENT/SOCIAL WORK
Continued Stay Note   [facility]     Patient Name: Tracey Nelson  MRN: [id number]  Today's Date: 2/24/2024    Admit Date: 2/19/2024    Plan: called and informed [facility] weekend to inform discharging today   Discharge Plan       Row Name 02/24/24 0914       Plan    Plan called and informed [facility] weekend to inform discharging today                   Discharge Codes    No documentation.                 Expected Discharge Date and Time       Expected Discharge Date Expected Discharge Time    Feb 24, 2024               Mabel Espinoza RN

## 2024-02-24 NOTE — PLAN OF CARE
Goal Outcome Evaluation:  Plan of Care Reviewed With: patient        Progress: improving  Outcome Evaluation: VSS, Ox4, RA. Patient has been up to the chair for lunch. Pt denied sob and pain. Family at bedside. No acute events occured during shift. Plan for possible d/c tomorrow. Plan of care ongoing. Pt expresses no further needs or concerns at this time, call light within reach.

## 2024-02-24 NOTE — PLAN OF CARE
Goal Outcome Evaluation:              Outcome Evaluation: Pt rested well this shift.  VSS.  Pain meds given as needed.  Pt alert and oriented x 4 this shift.  Anticipates being able to return home with family and home health today.

## 2024-02-24 NOTE — PLAN OF CARE
Goal Outcome Evaluation:  Plan of Care Reviewed With: patient        Progress: improving  Outcome Evaluation: VSS, Ox4, RA. Patient ready for safe discharge.

## 2024-02-25 ENCOUNTER — HOME CARE VISIT (OUTPATIENT)
Dept: HOME HEALTH SERVICES | Facility: HOME HEALTHCARE | Age: 84
End: 2024-02-25
Payer: MEDICARE

## 2024-02-25 VITALS
SYSTOLIC BLOOD PRESSURE: 162 MMHG | OXYGEN SATURATION: 97 % | HEART RATE: 72 BPM | DIASTOLIC BLOOD PRESSURE: 72 MMHG | RESPIRATION RATE: 16 BRPM | TEMPERATURE: 97.8 F

## 2024-02-25 PROCEDURE — G0151 HHCP-SERV OF PT,EA 15 MIN: HCPCS

## 2024-02-25 NOTE — PAYOR COMM NOTE
"To:  [employer]  From: Ana Bills RN  Phone: [phone]  Fax: [phone]  NPI: [id number]  TIN: [id number]  Member ID: [id number]   MRN: [id number]    Tracey Nelson (83 y.o. Female)       Date of Birth   1940    Social Security Number       Address   [address] [place] [address]    Home Phone   [phone]    MRN   [id number]       [employer]   None    Marital Status                               Admission Date   24    Admission Type   Emergency    Admitting Provider   Jhon Burnett MD    Attending Provider       Department, Room/Bed   [facility] TELEMETRY 4, 430/1       Discharge Date   2024    Discharge Disposition   Home-Health Care [facility]    Discharge Destination   Home                              Attending Provider: (none)   Allergies: No Known Allergies    Isolation: None   Infection: None   Code Status: Prior    Ht: 165.1 cm (65\")   Wt: 46 kg (101 lb 6.6 oz)    Admission Cmt: None   Principal Problem: Closed left hip fracture [S72.002A]                   Active Insurance as of 2024       Primary Coverage       Payor Plan Insurance Group Employer/Plan Group    HU[employer] MEDICARE REPLACEMENT HU[employer] MED ADV PPO [id number]       Payor Plan Address Payor Plan Phone Number Payor Plan Fax Number Effective Dates    [address] [address] [phone]  2024 - None Entered    [facility] [address]         Subscriber Name Subscriber Birth Date Member ID       TRACEY NELSON 1940 [id number]                     Emergency Contacts        (Rel.) Home Phone Work Phone Mobile Phone    Ang Nelson (Son) [phone] -- [phone]    LA RUTHERFORDRHONDA (Sister) [phone] -- [phone]                 Discharge Summary        Kerley, Brian Joseph, DO at 24 0928              [facility] HOSPITALIST   DISCHARGE SUMMARY      Name:  Tracey Nelson   Age:  83 y.o.  Sex:  female  :  1940  MRN:  [id number]   Visit Number:  " [id number]    Admission Date:  2/19/2024  Date of Discharge:  2/24/2024  Primary Care Physician:  Leola Wright MD    Important issues to note:    -Home with home health care PT/OT.  -Follow-up with orthopedics.  Follow-up with PCP.    Discharge Diagnoses:     Hip fracture status post ORIF  Delirium, resolved  Pneumonia, resolved      Problem List:     Active Hospital Problems    Diagnosis  POA    **Closed left hip fracture [S72.002A]  Yes    Severe malnutrition [E43]  Yes    Peripheral artery disease [I73.9]  Yes      Resolved Hospital Problems   No resolved problems to display.     Presenting Problem:    Chief Complaint   Patient presents with    Fall      Consults:     Consulting Physician(s)         Provider   Role Specialty     Antony Burton MD  Consulting Physician Cardiology     Cedrick Gomez MD  Consulting Physician Orthopedic Surgery          Procedures Performed:    Procedure(s):  HIP BIPOLAR REPLACEMENT LEFT    History of presenting illness/Hospital Course:    Tracey Nelson is an 83-year-old female with history of coronary artery disease, left bundle branch block, hypertension, peripheral artery disease was brought to the emergency room by her friend after she sustained a fall and developed left hip pain.  Patient was with her friend who brought her to the emergency room.  Patient apparently was going to get an echocardiogram done at [place] and her friend was driving but this happened while she was getting into the car.  Patient apparently hit her left forehead as well.  Patient states that she does have peripheral arterial disease and takes aspirin and has had stents in her legs.  She denies any prior history of coronary artery disease or coronary stents.  At time of admission only reporting concern of mild headache and left hip pain.     In the emergency room, she was afebrile and hemodynamically stable except for elevated blood pressure of 164/58.  Initial pulse oxygen  saturation was 94% on room air but she subsequently dropped to 87% and had to be placed on 2 L of nasal cannula oxygen.  Blood work including CMP and CBC was unremarkable except for a sodium of 132 and a hemoglobin of 9.2 (baseline 11).  INR 1.19.  CT of the head and CT of the cervical spine were unremarkable.  Chest x-ray showed abnormal retrocardiac density left lung base which she has been following up with Dr. Ramirez.  X-ray of the hip with pelvis showed left subcapital femoral neck fracture.  Patient's condition was discussed with Dr. Gomez and the patient was subsequently admitted to the medical floor for treatment of left hip fracture.    Status post ORIF left hip.  While in PACU developed significant hypoxia requiring 20 L.  Improved overnight, was able to be titrated off of oxygen.  X-ray showed possible left lower lobe pneumonia.  Provided short course of Rocephin.  No cough or shortness of air.  During course did develop delirium, secondary to urinary retention.  Bladder was decompressed, delirium resolved.  Worked well with therapy, she decided for home with home health, did not want to go to short-term rehab.    -Home with home health care PT/OT.  -Follow-up with orthopedics.  Follow-up with PCP.    Vital Signs:    Temp:  [97 °F (36.1 °C)-98.5 °F (36.9 °C)] 98.5 °F (36.9 °C)  Heart Rate:  [] 104  Resp:  [18-22] 18  BP: (158-180)/(58-73) 178/67    Physical Exam:    General Appearance:  Alert and cooperative.    Head:  Atraumatic and normocephalic.   Eyes: Conjunctivae and sclerae normal, no icterus. No pallor.   Ears:  Ears with no abnormalities noted.   Throat: No oral lesions, no thrush, oral mucosa moist.   Neck: Supple, trachea midline, no thyromegaly.   Back:   No kyphoscoliosis present. No tenderness to palpation.   Lungs:   Breath sounds heard bilaterally equally.  No crackles or wheezing. No Pleural rub or bronchial breathing.   Heart:  Normal S1 and S2, no murmur, no gallop, no rub. No  JVD.   Abdomen:   Normal bowel sounds, no masses, no organomegaly. Soft, nontender, nondistended, no rebound tenderness.   Extremities: Supple, no edema, no cyanosis, no clubbing.   Pulses: Pulses palpable bilaterally.   Skin: Warm.   Neurologic: Alert and oriented x 3. No facial asymmetry. Moves all four limbs. No tremors.     Pertinent Lab Results:     Results from last 7 days   Lab Units 02/24/24  0523 02/23/24  0528 02/22/24  0421   SODIUM mmol/L 136 137 136   POTASSIUM mmol/L 3.5 3.5 3.4*   CHLORIDE mmol/L 101 103 99   CO2 mmol/L 25.5 24.6 25.7   BUN mg/dL 16 19 16   CREATININE mg/dL 0.44* 0.47* 0.53*   CALCIUM mg/dL 9.1 9.1 9.2   GLUCOSE mg/dL 116* 107* 140*     Results from last 7 days   Lab Units 02/24/24  0523 02/23/24  0528 02/22/24  0421   WBC 10*3/mm3 8.63 10.67 15.86*   HEMOGLOBIN g/dL 8.7* 8.4* 8.4*   HEMATOCRIT % 27.6* 27.1* 27.8*   PLATELETS 10*3/mm3 315 312 343     Results from last 7 days   Lab Units 02/19/24  1546   INR  1.19*     Results from last 7 days   Lab Units 02/20/24  0707 02/20/24  0523   HSTROP T ng/L 18* 19*                     Results from last 7 days   Lab Units 02/21/24  1252 02/21/24  1247   BLOODCX  No growth at 2 days No growth at 2 days       Pertinent Radiology Results:    Imaging Results (All)       Procedure Component Value Units Date/Time    XR Hip 1 View Without Pelvis Left (Surgery Only) [[id number]] Collected: 02/20/24 1710     Updated: 02/20/24 2048    Narrative:      PROCEDURE: XR HIP 1 VIEW WO PELVIS LEFT-     1 VIEW     HISTORY: left hip replacement; S72.002A-Fracture of unspecified part of  neck of left femur, initial encounter for closed fracture;  S72.009A-Fracture of unspecified part of neck of unspecified femur,  initial encounter for closed fracture     COMPARISON: Preoperative exam 02/19/2024.     FINDINGS:  One view shows intraoperative view acquired during left hip  arthroplasty procedure. Femoral prosthesis is noted. There has been  resection of the left  femoral head. Right hip joint is obscured.       Impression:      Limited intraoperative imaging acquired during left hip  arthroplasty procedure.              This report was signed and finalized on 2/20/2024 8:46 PM by Ahsan Hui MD.       XR Chest 1 View [[id number]] Collected: 02/20/24 1831     Updated: 02/20/24 1918    Narrative:      FINAL REPORT    TECHNIQUE:  An AP portable view of the chest was obtained.    CLINICAL HISTORY:  desating rapidly    FINDINGS:  There is moderate cardiomegaly.  There is left lower lobe  consolidation and a small left pleural effusion.  There is no  pneumothorax.. There is no adenopathy or significant osseous  abnormality.      Impression:      Left lower lobe consolidation worrisome for pneumonia.  Small  left pleural effusion.    Authenticated and Electronically Signed by Robby Lancaster M.D. on  02/20/2024 07:17:34 PM    XR Hip With or Without Pelvis 2 - 3 View Left [[id number]] Collected: 02/20/24 1830     Updated: 02/20/24 1918    Narrative:      FINAL REPORT    TECHNIQUE:  A single view of the left hip was obtained.    CLINICAL HISTORY:  s/p L hip sarina    FINDINGS:  There are postoperative changes of left hip sarina-arthroplasty.  Orthopedic hardware is intact and normally articulated.  There  is no fracture.  There are vascular calcifications.  The right  hip is unremarkable.      Impression:      Vascular calcifications and expected postoperative findings.    Authenticated and Electronically Signed by Robby Lancaster M.D. on  02/20/2024 07:17:03 PM    CT Angiogram Chest Pulmonary Embolism [[id number]] Collected: 02/20/24 1832     Updated: 02/20/24 1914    Narrative:      FINAL REPORT    TECHNIQUE:  Thin section axial images were obtained through the chest and  during the arterial phase of IV contrast administration. Coronal  3-D MIP reconstructed images were also provided.    CLINICAL HISTORY:  Pulmonary embolism (PE) suspected, neg D-dimer    soa, ams, s/p hip replacement in  recovery    FINDINGS:  The pulmonary arteries are well-opacified and there is no  filling defect to indicate pulmonary embolism. The thoracic  aorta is normal.  There is a small to moderate pericardial  effusion.  There is dense left lower lobe consolidation  consistent with pneumonia.  There is a small left pleural  effusion and mild right lung atelectasis.  There is mild  emphysema.  There is no adenopathy or significant osseous  abnormality.      Impression:      No pulmonary embolism.    Left lower lobe pneumonia with small left pleural effusion.    Small to moderate pericardial effusion.    Authenticated and Electronically Signed by Robby Lancaster M.D. on  02/20/2024 07:13:07 PM    XR Chest 1 View [[id number]] Collected: 02/19/24 1521     Updated: 02/19/24 1529    Narrative:      PROCEDURE: XR CHEST 1 VW-     HISTORY: Preoperative for respiratory clearance.     COMPARISON: 10/16/2022 and 12/24/2023.     FINDINGS:  Portable view of the chest demonstrates chronic changes right  infrahilar region. There is underlying emphysema. Retrocardiac density  left lung base is noted new from prior exam which may represent  pneumonia although mass is not excluded. Small left pleural effusion is  suspected. The mediastinum is unremarkable.     The heart size is normal.       Impression:      Abnormal retrocardiac density left lung base. Given  chronicity consider contrast-enhanced chest CT to further characterize.           This report was signed and finalized on 2/19/2024 3:27 PM by Ahsan Hui MD.       XR Hip With or Without Pelvis 2 - 3 View Left [[id number]] Collected: 02/19/24 1437     Updated: 02/19/24 1439    Narrative:      PROCEDURE: XR HIP W OR WO PELVIS 2-3 VIEW LEFT-     HISTORY: trauma, pain     FINDINGS:  Two views show fracture of the subcapital left femoral neck.   There is mild impaction and displacement.     The joint spaces appear normal.          Impression:      Subcapital left femoral neck fracture as  above.           This report was signed and finalized on 2/19/2024 2:37 PM by Ahsan Hui MD.       CT Cervical Spine Without Contrast [[id number]] Collected: 02/19/24 1427     Updated: 02/19/24 1430    Narrative:      PROCEDURE: CT CERVICAL SPINE WO CONTRAST-     HISTORY: Neck trauma (Age >= 65y), neck pain     TECHNIQUE: Thin section axial CT with sagittal and coronal  reconstructions     FINDINGS: No fracture is present. Retrolisthesis C3-4 measures 3.5 mm.  Remaining alignment is normal. Moderate diffuse degenerative disc  disease is noted. Canal stenosis is seen C4-5. There is multilevel bony  neural foraminal narrowing..       Impression:      No fracture        This study was performed with techniques to keep radiation doses as low  as reasonably achievable (ALARA). Individualized dose reduction  techniques using automated exposure control or adjustment of vA and/or  kV according to the patient size were employed.      This report was signed and finalized on 2/19/2024 2:28 PM by Ahsan Hui MD.       CT Head Without Contrast [[id number]] Collected: 02/19/24 1426     Updated: 02/19/24 1429    Narrative:      PROCEDURE: CT HEAD WO CONTRAST-     HISTORY: Head trauma, minor (Age >= 65y)     TECHNIQUE: Noncontrast exam     FINDINGS: Moderate atrophy and chronic ischemic white matter changes are  noted.     No cortical edema is present. There is no mass or hemorrhage. Ventricles  are normal.     Bone windows show no skull fracture or obvious destructive lesion.       Impression:      1. No acute intracranial abnormality or obvious mass.   2. Atrophy and chronic ischemic white matter changes as above.        This study was performed with techniques to keep radiation doses as low  as reasonably achievable (ALARA). Individualized dose reduction  techniques using automated exposure control or adjustment of vA and/or  kV according to the patient size were employed.            This report was signed and finalized on  2/19/2024 2:27 PM by Ahsan Hui MD.               Echo:    Results for orders placed during the hospital encounter of 02/19/24    Adult Transthoracic Echo Complete W/ Cont if Necessary Per Protocol    Interpretation Summary    Left ventricular systolic function is normal. Calculated left ventricular 3D EF = 55% Left ventricular ejection fraction appears to be 56 - 60%.    Grade II diastolic dysfunction. Left ventricular wall thickness is consistent with mild concentric hypertrophy.    Normal right ventricular size and function.    The right atrial cavity is dilated.    Mild tricuspid valve regurgitation is present. Estimated right ventricular systolic pressure from tricuspid regurgitation is moderately elevated (45-55 mmHg).    There is a moderate (1-2cm) circumferential pericardial effusion measuring largest anteriorly along the RV free wall at 1.6 cm. There is no echocardiographic evidence of cardiac tamponade.    Condition on Discharge:      Stable.    Code status during the hospital stay:    Code Status and Medical Interventions:   Ordered at: 02/19/24 1719     Code Status (Patient has no pulse and is not breathing):    CPR (Attempt to Resuscitate)     Medical Interventions (Patient has pulse or is breathing):    Full Support     Discharge Disposition:    Home-Health Care [facility]    Discharge Medications:       Discharge Medications        New Medications        Instructions Start Date   oxyCODONE-acetaminophen 5-325 MG per tablet  Commonly known as: PERCOCET   1 tablet, Oral, Every 4 Hours PRN             Continue These Medications        Instructions Start Date   amLODIPine 2.5 MG tablet  Commonly known as: NORVASC   every night at bedtime.      Aspirin 81 MG capsule   Take 1 tablet by mouth Daily.      benzonatate 200 MG capsule  Commonly known as: TESSALON   200 mg, Oral, 3 Times Daily PRN      busPIRone 10 MG tablet  Commonly known as: BUSPAR   buspirone 10 mg tablet   Two times a day as needed       cilostazol 50 MG tablet  Commonly known as: PLETAL   1 tablet, Oral, 2 Times Daily      cyanocobalamin 250 MCG tablet  Commonly known as: VITAMIN B-12   Vitamin B-12 250 mcg tablet   Take 1 Daily      escitalopram 10 MG tablet  Commonly known as: LEXAPRO   10 mg, Oral, Daily      losartan 50 MG tablet  Commonly known as: COZAAR   Take 1 tablet by mouth Daily.      simvastatin 40 MG tablet  Commonly known as: ZOCOR   1 tablet, Oral, Every Night at Bedtime      Trelegy Ellipta 200-62.5-25 MCG/ACT inhaler  Generic drug: Fluticasone-Umeclidin-Vilant   Inhalation, Daily With Lunch             Stop These Medications      azithromycin 250 MG tablet  Commonly known as: ZITHROMAX     Cholecalciferol 50 MCG (2000 UT) tablet     doxazosin 2 MG tablet  Commonly known as: CARDURA     furosemide 20 MG tablet  Commonly known as: LASIX     ipratropium-albuterol 0.5-2.5 mg/3 ml nebulizer  Commonly known as: DUO-NEB     lisinopril 30 MG tablet  Commonly known as: PRINIVIL,ZESTRIL     metoprolol tartrate 50 MG tablet  Commonly known as: LOPRESSOR     mirtazapine 15 MG tablet  Commonly known as: REMERON     Nebulizer device     Vitamin D3 125 MCG (5000 UT) tablet dispersible            Discharge Diet:     Diet Instructions       Diet: Cardiac Diets; Healthy Heart (2-3 Na+); Regular Texture (IDDSI 7); Thin (IDDSI 0)      Discharge Diet: Cardiac Diets    Cardiac Diet: Healthy Heart (2-3 Na+)    Texture: Regular Texture (IDDSI 7)    Fluid Consistency: Thin (IDDSI 0)          Activity at Discharge:     Activity Instructions       Activity as Tolerated            Follow-up Appointments:    Additional Instructions for the Follow-ups that You Need to Schedule       Ambulatory Referral to Home Health (Hospital)   As directed      Face to Face Visit Date: 2/23/2024   Follow-up provider for Plan of Care?: I treated the patient in an acute care facility and will not continue treatment after discharge.   Follow-up provider: ALEJANDRO VIDALES  [[id number]]   Reason/Clinical Findings: Status post hip replacement   Describe mobility limitations that make leaving home difficult: Impaired mobility and ADLs   Nursing/Therapeutic Services Requested: Physical Therapy Occupational Therapy   PT orders: Total joint pathway Therapeutic exercise Gait Training Transfer training Home safety assessment Strengthening   Weight Bearing Status: As Tolerated   Occupational orders: Activities of daily living Home safety assessment Energy conservation Strengthening Cognition Fine motor   Frequency: 1 Week 1               Contact information for follow-up providers       Leola Wright MD Follow up in 3 day(s).    Specialty: Internal Medicine  Contact information:  [address]  [facility] [address]  [phone]               Cedrick Gomez MD Follow up on 3/5/2024.    Specialty: Orthopedic Surgery  Contact information:  [address]  [facility] [address]  [phone]                       Contact information for after-discharge care       Durable Medical Equipment       [facility] .    Service: Durable Medical Equipment  Contact information:  [address]  [facility] [address]  [phone]                     Home Medical Care       [facility] .    Service: Home Health Services  Contact information:  [address] [facility] [address] [phone]                                 No future appointments.  Test Results Pending at Discharge:    Pending Labs       Order Current Status    Blood Culture - Blood, Arm, Left Preliminary result    Blood Culture - Blood, Arm, Right Preliminary result               Brian Joseph Kerley, DO  02/24/24  09:28 EST    Time: I spent 35 minutes on this discharge activity which included: face-to-face encounter with the patient, reviewing the data in the system, coordination of the care with the nursing staff as well as consultants, documentation, and entering  orders.     Dictated utilizing Dragon dictation.      Electronically signed by Kerley, Brian Joseph, DO at 02/24/24 1920

## 2024-02-26 ENCOUNTER — HOME CARE VISIT (OUTPATIENT)
Dept: HOME HEALTH SERVICES | Facility: HOME HEALTHCARE | Age: 84
End: 2024-02-26
Payer: MEDICARE

## 2024-02-26 VITALS
SYSTOLIC BLOOD PRESSURE: 143 MMHG | RESPIRATION RATE: 16 BRPM | HEART RATE: 84 BPM | DIASTOLIC BLOOD PRESSURE: 58 MMHG | OXYGEN SATURATION: 95 % | TEMPERATURE: 97.4 F

## 2024-02-26 LAB
BACTERIA SPEC AEROBE CULT: NORMAL
BACTERIA SPEC AEROBE CULT: NORMAL

## 2024-02-26 PROCEDURE — G0152 HHCP-SERV OF OT,EA 15 MIN: HCPCS

## 2024-02-26 NOTE — HOME HEALTH
"SOC Note:    Patient s/p L partial hip replacement secondary to fracture and subseqent surgery.  She returned home yesterday where she lives in a 1 story home with family.  Her son is available prn but she lives with her sister. She was very independent prior, amb without AD, was driving, denies any other fall history besides this most recent episode.  Patient was driving and very independent per patient sister during initial evaluation.  She states her goal is to return to ambulation ad ling within her home without AD. Patient and family deny any therapy restrictions at this time.     Home Health ordered for: disciplines PT, OT    Reason for Hosp/Primary Dx/Co-morbidities: Patient s/p fall in her home resulting in a partiial THR.     Focus of Care: PT to focus on ambulation, safety, bilateral LE strengthening, transfer performance as a result of L THR.     Patient's goal(s): \"to get stronger\"    Current Functional status/mobility/DME: RW    HB status/Living Arrangements: patient lives with her sister in a 1 story home, amb with RW currently, but PLOF she did not ambulate with any type of AD.     Skin Integrity/wound status: WFL per patient and caregiver report.     Code Status: Full Code    Plan for next visit: HEP proression, bilateral Le strengthening, ambulation"

## 2024-02-26 NOTE — CASE COMMUNICATION
"SOC Note:    Patient s/p L partial hip replacement secondary to fracture and subseqent surgery.  She returned home yesterday where she lives in a 1 story home with family.  Her son is available prn but she lives with her sister. She was very independent prior, amb without AD, was driving, denies any other fall history besides this most recent episode.  Patient was driving and very independent per patient sister during initial evaluation .  She states her goal is to return to ambulation ad ling within her home without AD. Patient and family deny any therapy restrictions at this time.     Home Health ordered for: disciplines PT, OT    Reason for Hosp/Primary Dx/Co-morbidities: Patient s/p fall in her home resulting in a partiial THR.     Focus of Care: PT to focus on ambulation, safety, bilateral LE strengthening, transfer performance as a result of L THR.     Patient's g oal(s): \"to get stronger\"    Current Functional status/mobility/DME: RW    HB status/Living Arrangements: patient lives with her sister in a 1 story home, amb with RW currently, but PLOF she did not ambulate with any type of AD.     Skin Integrity/wound status: WFL per patient and caregiver report.     Code Status: Full Code    Plan for next visit: HEP proression, bilateral Le strengthening, ambulation"

## 2024-02-27 NOTE — HOME HEALTH
LANCE Javier in home 2/26 with Cg present    Pt is an 84 yo female, /p L partial hip replacement secondary to fracture and subseqent surgery.  Pt declines STR after hospital stay, retuned home and has sister in home to assist.  Pt reports indep PLOF.  Pt currenly using RW.  OBS transfer, pt educated on hip precautions, vitals assessed.  Pt will benefit from skilled OT in home 1wk4.  Pt denies recent falls since home, no changes to PCP, meds, or insurance reported.      OT freq 1wk4 Pt agreeable  Vitals assessed per agency guidelines  Pt is a fall risk    ALS assist, seated bathing, dressing LEs, low activity tolerance

## 2024-02-28 ENCOUNTER — READMISSION MANAGEMENT (OUTPATIENT)
Dept: CALL CENTER | Facility: HOSPITAL | Age: 84
End: 2024-02-28
Payer: MEDICARE

## 2024-02-28 NOTE — OUTREACH NOTE
Total Joint Week 1 Survey      Flowsheet Row Responses   [facility] patient discharged from? [facility]   Does the patient have one of the following disease processes/diagnoses(primary or secondary)? Total Joint Replacement   Joint surgery performed? Hip   Week 1 attempt successful? No   Unsuccessful attempts Attempt 1  [No current verbal release.]            Pamela KENNEDY - Licensed Nurse

## 2024-02-29 NOTE — PROGRESS NOTES
"Enter Query Response Below      Query Response: Bacterial pneumonia unspecified              If applicable, please update the problem list.   Patient: Tracey Nelson        : 1940  Account: [id number]           Admit Date: 2024        How to Respond to this query:       a. Click New Note     b. Answer query within the yellow box.                c. Update the Problem List, if applicable.      If you have any questions about this query contact me at: [email or web address]  [phone]      Dr. Kerley:    Risk Factors: 83 y F with history of COPD admitted (24) with left hip fracture and underwent ORIF (24).      Clinical Indicators and Treatments:  Subsequent documentation of \"LLL PNA Rocephin. May have aspiration,\" noted on -. Discharge Summary diagnosis of \"Pneumonia resolved.\"   CXR:  Portable view of the chest demonstrates chronic changes right infrahilar region. There is underlying emphysema. Retrocardiac density left lung base is noted new from prior exam which may represent pneumonia although mass is not excluded. Small left pleural effusion is suspected. The mediastinum is unremarkable. WBC 8.88.    @ 1733 Rapid response in PACU with desaturation when lying flat for X-ray and placed on NRB.    RN note, \"Pt had been stable in PACU other than coughing clear white sputum that required oral suctioning.\"   @ 1756 CT Chest: No pulmonary embolism. Left lower lobe pneumonia with small left pleural effusion. Small to moderate pericardial effusion.   WBC 12.08, 15.86  Hospital course included, \"Provided short course of Rocephin.  No cough or shortness of air.\"   MAR: IV Rocephin -    Please clarify the type of pneumonia the patient was treated/monitored for:    Bacterial pneumonia unspecified  Aspiration pneumonia  Other- specify______  Unable to determine    By submitting this query, we are merely seeking further clarification of documentation to accurately " reflect all conditions that you are monitoring, evaluating, treating or that extend the hospitalization or utilize additional resources of care. Please utilize your independent clinical judgment when addressing the question(s) above.     This query and your response, once completed, will be entered into the legal medical record.    Sincerely,    DOE Bellamy, RN, [facility]  Clinical Documentation Integrity Program

## 2024-03-01 ENCOUNTER — HOME CARE VISIT (OUTPATIENT)
Dept: HOME HEALTH SERVICES | Facility: HOME HEALTHCARE | Age: 84
End: 2024-03-01
Payer: MEDICARE

## 2024-03-01 VITALS
SYSTOLIC BLOOD PRESSURE: 133 MMHG | OXYGEN SATURATION: 96 % | HEART RATE: 65 BPM | TEMPERATURE: 97.8 F | RESPIRATION RATE: 16 BRPM | DIASTOLIC BLOOD PRESSURE: 56 MMHG

## 2024-03-01 PROCEDURE — G0157 HHC PT ASSISTANT EA 15: HCPCS

## 2024-03-02 NOTE — HOME HEALTH
"Routine Visit Note: Greeted at door by caregiver. Patient first seen sitting in recliner with no apparent signs of distress. Patient states that she is feeling \"okay\" and mentions that she has been ambulating frequently throughout the day / reports regular compliance with her HEP as prescribed.     Skill/education provided: Instructed gait training, therapeutic exercise, and balance training today. Educated patient on HEP, mobility precautions/post-surgical care, and pain management strategies.    Patient/caregiver response: Patient tolerated all treatment well today, with no signs of distress, excessive fatigue, or exacerbation of pain. Patient verbalizes understanding of all provided education and appears on track to meet her goals / exhibits improvement overall today.     Plan for next visit: Patient would benefit from continued skilled PT to address deficits in LE/Hip strength, balance, gait quality, and overall functional mobility. Progress as tolerated with exercise next visit. Review education as needed."

## 2024-03-04 ENCOUNTER — HOME CARE VISIT (OUTPATIENT)
Dept: HOME HEALTH SERVICES | Facility: HOME HEALTHCARE | Age: 84
End: 2024-03-04
Payer: MEDICARE

## 2024-03-04 VITALS
SYSTOLIC BLOOD PRESSURE: 154 MMHG | HEART RATE: 75 BPM | RESPIRATION RATE: 16 BRPM | TEMPERATURE: 97.8 F | OXYGEN SATURATION: 97 % | DIASTOLIC BLOOD PRESSURE: 50 MMHG

## 2024-03-04 VITALS
HEART RATE: 85 BPM | DIASTOLIC BLOOD PRESSURE: 60 MMHG | OXYGEN SATURATION: 96 % | TEMPERATURE: 98.8 F | SYSTOLIC BLOOD PRESSURE: 151 MMHG

## 2024-03-04 PROCEDURE — G0157 HHC PT ASSISTANT EA 15: HCPCS

## 2024-03-04 PROCEDURE — G0152 HHCP-SERV OF OT,EA 15 MIN: HCPCS

## 2024-03-04 NOTE — HOME HEALTH
"Routine Visit Note: Called into home by patient who was first seen sitting on couch with no apparent signs of distress. Patient states that she is feeling \"pretty good\", but mentions that she had slight soreness in her \"groin\" last night / reports regular compliance with her HEP as prescribed.     Skill/education provided: Instructed gait training, therapeutic exercise, and balance training today. Educated patient on HEP, post surgical care, fall prevention, and pain management strategies.     Patient/caregiver response: Patient tolerated all treatment well today with no signs of distress / requires occasional seated rest breaks throughout session to recover due to fatigue/shortness of breath. Patient verbalizes understanding of all provided education and appears on track to meet her goals / exhibits improvement overall today.     Plan for next visit: Patient would benefit from continued skilled PT to address deficits in BLE/hip strength, balance, gait, and to improve overall functional mobility / to facilitate post surgical healing. Reinforce mobility precautions and review HEP / progress exercise appropriately."

## 2024-03-06 ENCOUNTER — READMISSION MANAGEMENT (OUTPATIENT)
Dept: CALL CENTER | Facility: HOSPITAL | Age: 84
End: 2024-03-06
Payer: MEDICARE

## 2024-03-06 NOTE — OUTREACH NOTE
Total Joint Week 1 Survey      Flowsheet Row Responses   [facility] patient discharged from? [facility]   Does the patient have one of the following disease processes/diagnoses(primary or secondary)? Total Joint Replacement   Joint surgery performed? Hip   Call start time 1709   Call end time 1713   Has the patient been back in either the hospital or Emergency Department since discharge? No   Discharge diagnosis Hip bipolar replacement left   Does the patient have all medications related to this admission filled (includes all antibiotics, pain medications, etc.) Yes   Is the patient taking all medications as directed (includes completed medication regime)? Yes   Is the patient able to teach back alternate methods of pain control? Reposition, Correct alignment, Short, frequent activity   Does the patient have a follow up appointment with their surgeon? Yes   Has the patient kept scheduled appointments due by today? N/A   What is the Home health agency?  [facility]   Has home health visited the patient within 72 hours of discharge? Yes   What DME was ordered? [employer] for BSC, Walker (delivered)   Has all DME been delivered? Yes   Psychosocial issues? No   Has the patient began therapy sessions (either in the home or as an out patient)? Yes   If the patient has started attending therapy, what post op day did they begin to attend (either in home or as an out patient)?    PT   Does the patient have a wound vac in place? No   Has the patient fallen since discharge? No   Did the patient receive a copy of their discharge instructions? Yes   Nursing interventions Reviewed instructions with patient   What is the patient's perception of their functional status since discharge? Improving   Is the patient able to teach back signs and symptoms of infection? Temp >100.4 for 24h or longer, Incisional drainage, Increased swelling or redness around incision (not associated with surgical edema), Severe discomfort or pain,  Shortness of breath or chest pain   Is the patient able to teach back how to prevent infection? Check incision daily, Wash hands before and after touching incision, Keep incision covered if drainage, Keep incision covered if pets in house, Shower only as directed by surgeon, Eat well-balanced diet, No tub baths, hot tub or swimming   Is the patient able to teach back signs and symptoms of DVT? Redness in calf, Area hot to touch, Shortness of breath or chest pain, Severe pain in calf, Swelling in calf   Did the patient implement home safety suggestions from pre-surgery classes if attended? N/A   If the patient is a current smoker, are they able to teach back resources for cessation? --  [has not smoked since fall]   Is the patient/caregiver able to teach back the hierarchy of who to call/visit for symptoms/problems? PCP, Specialist, Home health nurse, Urgent Care, ED, 911 Yes   Call end time [id number]            Ayana KENNEDY - Registered Nurse

## 2024-03-06 NOTE — HOME HEALTH
Routine Visit Note: HHOT reg session in home with Cg present    Skill/education provided: OT assessed bathroom, provided pt with hand held sprayer.  Vitals obtained.  Pt education provided.  Family encouraged to monitor BP at home.  Pt has agency contact info in home.  Educated on HEP. Pt reports tub chair in attic, OT encouraged use if released by MD to bathe.  UE TE, provided band.     Patient/caregiver response: cooperative and engaged    Plan for next visit: bathroom safety and indep w/ DME use    Other pertinent info: fall risk, walker use

## 2024-03-07 ENCOUNTER — HOME CARE VISIT (OUTPATIENT)
Dept: HOME HEALTH SERVICES | Facility: HOME HEALTHCARE | Age: 84
End: 2024-03-07
Payer: MEDICARE

## 2024-03-07 VITALS
TEMPERATURE: 97.8 F | SYSTOLIC BLOOD PRESSURE: 149 MMHG | RESPIRATION RATE: 16 BRPM | HEART RATE: 82 BPM | OXYGEN SATURATION: 95 % | DIASTOLIC BLOOD PRESSURE: 56 MMHG

## 2024-03-07 PROCEDURE — G0157 HHC PT ASSISTANT EA 15: HCPCS

## 2024-03-07 NOTE — HOME HEALTH
"Routine Visit Note: Called into home by patient, who was first seen sitting on couch with  no apparent signs of distress. Patient states that she is feeling \"tired\" and mentions the she is \"a little sore\" today / reports regular compliance with her HEP as prescribed. Mentions she has visit with MD upcoming.    Skill/education provided: Instructed gait training and therapeutic exercise today. Educated patient on HEP, mobility precautions, fall prevention strategies, and pain management.    Patient/caregiver response: Patient tolerated all treatment fairly well today, but requested to hold progressions with exercise today due to fatigue/soreness / no signs of distress. Patient verbalizes understanding of all provided education and appears on track to meet her goals.    Plan for next visit: Patient would benefit from continued skilled PT to address deficits in LE/hip strength, balance, endurance, gait quality, and overall functional mobility / to facilitate post surgical recovery/healing. Progress as tolerated with gait training and standing exercises. Follow up with patient regarding upcoming MD appointment. Review and progress HEP appropriately."

## 2024-03-08 ENCOUNTER — HOME CARE VISIT (OUTPATIENT)
Dept: HOME HEALTH SERVICES | Facility: HOME HEALTHCARE | Age: 84
End: 2024-03-08
Payer: MEDICARE

## 2024-03-11 ENCOUNTER — HOME CARE VISIT (OUTPATIENT)
Dept: HOME HEALTH SERVICES | Facility: HOME HEALTHCARE | Age: 84
End: 2024-03-11
Payer: MEDICARE

## 2024-03-11 VITALS
DIASTOLIC BLOOD PRESSURE: 80 MMHG | HEART RATE: 77 BPM | RESPIRATION RATE: 18 BRPM | OXYGEN SATURATION: 99 % | SYSTOLIC BLOOD PRESSURE: 150 MMHG

## 2024-03-11 VITALS
HEART RATE: 65 BPM | DIASTOLIC BLOOD PRESSURE: 69 MMHG | OXYGEN SATURATION: 93 % | RESPIRATION RATE: 17 BRPM | SYSTOLIC BLOOD PRESSURE: 155 MMHG

## 2024-03-11 VITALS
TEMPERATURE: 97.8 F | RESPIRATION RATE: 16 BRPM | SYSTOLIC BLOOD PRESSURE: 148 MMHG | DIASTOLIC BLOOD PRESSURE: 65 MMHG | HEART RATE: 84 BPM | OXYGEN SATURATION: 94 %

## 2024-03-11 PROCEDURE — G0157 HHC PT ASSISTANT EA 15: HCPCS

## 2024-03-11 PROCEDURE — G0152 HHCP-SERV OF OT,EA 15 MIN: HCPCS

## 2024-03-11 PROCEDURE — G0299 HHS/HOSPICE OF RN EA 15 MIN: HCPCS

## 2024-03-11 NOTE — CASE COMMUNICATION
SN initial eval assessment    Patient has stage 2 pressure injury on coccyx over bony prominence. Recommending medihoney to wound bed daily and covering with optifoam pad. Granddaughter educated on wound care and verbalizes understanding. SN to see patient weekly until healed. SUpplies ordered.    Thanks,  APARNA Gregg

## 2024-03-11 NOTE — HOME HEALTH
"Routine Visit Note: Called into home by patient, who was first seen sitting on couch with no apparent signs of distress. Patient states that she is feeling \"pretty good\" and reports compliance with her HEP, but \"not as much as I should\". Mentions that she is becoming short of breath easily.    Skill/education provided: Instructed gait training, therapeutic exercise, and balance training today. Educated patient on HEP, breathing techniques/energy conservation, and fall prevention.    Patient/caregiver response: Patient tolerated all treatment  fairly well today, but fatigued quickly and requested frequent seated rest breaks to recover. Patient verbalizes understanding of all provided education today. Progress indicates lack of compliance with HEP.     Plan for next visit: Patient would benefit from continued skilled PT to address deficits in LE/hip strength, balance, endurance, gait quality, and to improve overall functional mobility / to facilitate post surgical recovery. Progress with standing exercise and gait training as tolerated next visit. Review and progress HEP appropriately."

## 2024-03-12 NOTE — HOME HEALTH
Routine Visit Note:3/1//24    HHOT reg session in home, CG present.  Pt presents supine on couch upon entry.  tired from PT session prior.  Not up to much today.  OT provided education, safety assessment, vitals.  Pt pale at first.  BP conts to run in 150s.  OT provided agency contact info 24/7 as needed.  OT assist pt to weigh pt 93.4 lbs.  Conts with walker use, mod indep with couch transfers. Encouraged pt to drink water.  OT OBS transfer, safe w/o LOB.     Patient/caregiver response: pt not up to much today after [facility]HS[facility] and HHPT visit prior to OTs.     Plan for next visit: shower transfers    Other pertinent info: fall risk    No reports of falls, no changes to meds, PCP, or insurance

## 2024-03-12 NOTE — CASE COMMUNICATION
BP conts to be elevated 155/69 today OT session.  Weight 93.4  Encouraged to drink water, reports drinking Ensure.

## 2024-03-14 ENCOUNTER — HOME CARE VISIT (OUTPATIENT)
Dept: HOME HEALTH SERVICES | Facility: HOME HEALTHCARE | Age: 84
End: 2024-03-14
Payer: MEDICARE

## 2024-03-14 VITALS
RESPIRATION RATE: 16 BRPM | SYSTOLIC BLOOD PRESSURE: 137 MMHG | DIASTOLIC BLOOD PRESSURE: 80 MMHG | HEART RATE: 79 BPM | TEMPERATURE: 98 F | OXYGEN SATURATION: 97 %

## 2024-03-14 PROCEDURE — G0157 HHC PT ASSISTANT EA 15: HCPCS

## 2024-03-14 NOTE — HOME HEALTH
"Routine Visit Note: Greeted at door by patient, who was first seen ambulating with rolling walker, showing no apparent signs of distress. Patient states that she is feeling \"pretty good\" and reports regular compliance with her HEP as prescribed / mentions that she is starting to feel \"a little better\" and states that she is ambulating more frequently throughout the day    Skill/education provided: Instructed gait training, therapeutic exercise, and balance training today. Educated patient on HEP, pain management, and fall prevention.     Patient/caregiver response: Patient tolerated all treatment well today, with no signs of distress, excessive fatigue, or pain. Patient verbalizes understanding of all provided education and appears on track to meet her goals / exhibits improvement overall today, ambulating greater distances and completing increased amounts of exercise before requiring a seated rest break..     Plan for next visit: Patient would benefit from continued skilled PT to address deficits in LE/hip strength, balance, gait quality, and overall functional mobility. Progress as tolerated with gait training and standing there ex/balance exercise as tolerated. Review and progress HEP appropriately."

## 2024-03-16 ENCOUNTER — HOME CARE VISIT (OUTPATIENT)
Dept: HOME HEALTH SERVICES | Facility: HOME HEALTHCARE | Age: 84
End: 2024-03-16
Payer: MEDICARE

## 2024-03-16 PROCEDURE — G0299 HHS/HOSPICE OF RN EA 15 MIN: HCPCS

## 2024-03-17 VITALS
HEART RATE: 81 BPM | TEMPERATURE: 98.5 F | RESPIRATION RATE: 16 BRPM | SYSTOLIC BLOOD PRESSURE: 140 MMHG | OXYGEN SATURATION: 96 % | DIASTOLIC BLOOD PRESSURE: 60 MMHG

## 2024-03-18 ENCOUNTER — HOME CARE VISIT (OUTPATIENT)
Dept: HOME HEALTH SERVICES | Facility: HOME HEALTHCARE | Age: 84
End: 2024-03-18
Payer: MEDICARE

## 2024-03-18 PROCEDURE — G0157 HHC PT ASSISTANT EA 15: HCPCS

## 2024-03-19 ENCOUNTER — HOME CARE VISIT (OUTPATIENT)
Dept: HOME HEALTH SERVICES | Facility: HOME HEALTHCARE | Age: 84
End: 2024-03-19
Payer: MEDICARE

## 2024-03-19 VITALS
RESPIRATION RATE: 16 BRPM | OXYGEN SATURATION: 96 % | HEART RATE: 88 BPM | DIASTOLIC BLOOD PRESSURE: 60 MMHG | TEMPERATURE: 97.6 F | SYSTOLIC BLOOD PRESSURE: 152 MMHG

## 2024-03-19 PROCEDURE — G0300 HHS/HOSPICE OF LPN EA 15 MIN: HCPCS

## 2024-03-19 NOTE — HOME HEALTH
Routine Visit Note: LPN    Skill/education provided: Wound care to coccyx. Cleansed with sterile normal saline, applied medihoney to wound bed, covered with optifoam border dressing.           Patient/caregiver response: Patient and grandson verbalizes understanding of instructions.      Plan for next visit: Wound care to coccyx. Cleanse with sterile normal saline, apply medihoney to wound bed, cover with optifoam border dressing.      Other pertinent info: N/A    Elevated /79 right arm, 166/60 left arm. I called the doctor's office and spoke to pankaj Davis, and waiting for call back.

## 2024-03-19 NOTE — HOME HEALTH
"Routine Visit Note: Called into home by patient who was first seen sitting on couch with no apparent signs of distress. Patient states that she is feeling \"alright, but I didn't sleep well last night\" and reports regular compliance with her HEP as prescribed.     Skill/education provided: Instructed gait training, therapeutic exercise, and balance training today. Educated patient on HEP, fall prevention strategies, and pain management.    Patient/caregiver response: Patient tolerated all treatment well today, with no signs of distress or exacerbation of pain symptoms. Patient verbalizes understanding of all provided education and appears on track to meet her goals / exhibits improvement overall today. Continues to exhibit shortness of breath following gait training and standing exercise, recovering quickly following seated breaks.     Plan for next visit: Patient would benefit from continued skilled PT to address deficits in LE/hip strength, balance, endurance, gait quality, and to improve overall functional mobility / top facilitate post surgical recovery and healing. Progress as toelrated with standing exercises and gait training. Review HEPand reinforce provided education as needed."

## 2024-03-20 ENCOUNTER — READMISSION MANAGEMENT (OUTPATIENT)
Dept: CALL CENTER | Facility: HOSPITAL | Age: 84
End: 2024-03-20
Payer: MEDICARE

## 2024-03-20 VITALS
SYSTOLIC BLOOD PRESSURE: 166 MMHG | TEMPERATURE: 97.7 F | OXYGEN SATURATION: 97 % | HEART RATE: 89 BPM | DIASTOLIC BLOOD PRESSURE: 60 MMHG | RESPIRATION RATE: 18 BRPM

## 2024-03-20 NOTE — OUTREACH NOTE
Total Joint Month 1 Survey      Flowsheet Row Responses   [facility], [place], operated by [facility] patient discharged from? [facility]   Does the patient have one of the following disease processes/diagnoses(primary or secondary)? Total Joint Replacement   Joint surgery performed? Hip   Month 1 attempt successful? Yes   Call start time 0939   Call end time 0940   Is the patient taking all medications as directed (includes completed medication regime)? Yes   Has the patient kept scheduled appointments due by today? Yes   Is the patient still receiving Home Health Services? Yes   What is the patient's perception of their functional status since discharge? Improving   Month 1 call completed? Yes   Graduated Yes   Call end time 0940            Mary KENNEDY - Registered Nurse

## 2024-03-20 NOTE — HOME HEALTH
Routine Visit Note: LPN    Skill/education provided: Wound care to coccyx. Cleansed with sterile normal saline, applied medihoney to wound bed, covered with optifoam border dressing.           Patient/caregiver response: Patient and grandson verbalizes understanding of instructions.      Plan for next visit: Wound care to coccyx. Cleanse with sterile normal saline, apply medihoney to wound bed, cover with optifoam border dressing.      Other pertinent info: N/A

## 2024-03-21 ENCOUNTER — HOME CARE VISIT (OUTPATIENT)
Dept: HOME HEALTH SERVICES | Facility: HOME HEALTHCARE | Age: 84
End: 2024-03-21
Payer: MEDICARE

## 2024-03-21 VITALS
TEMPERATURE: 98.2 F | HEART RATE: 88 BPM | DIASTOLIC BLOOD PRESSURE: 75 MMHG | RESPIRATION RATE: 16 BRPM | OXYGEN SATURATION: 96 % | SYSTOLIC BLOOD PRESSURE: 150 MMHG

## 2024-03-21 VITALS
OXYGEN SATURATION: 98 % | HEART RATE: 95 BPM | DIASTOLIC BLOOD PRESSURE: 75 MMHG | RESPIRATION RATE: 16 BRPM | TEMPERATURE: 98.2 F | SYSTOLIC BLOOD PRESSURE: 150 MMHG

## 2024-03-21 PROCEDURE — G0152 HHCP-SERV OF OT,EA 15 MIN: HCPCS

## 2024-03-21 PROCEDURE — G0157 HHC PT ASSISTANT EA 15: HCPCS

## 2024-03-21 NOTE — HOME HEALTH
"Routine Visit Note: Greeted at door by patient, who was first seen ambulating with rolling walker, showing no apparent signs of distress. Patient states that she is feeling \"alright \" and reports regular compliance with her HEP as prescribed.     Skill/education provided: Instructed gait training, therapeutic exercise, and balance training today. Educated patient on HEP on pain management, pressure relief strategies, and fall prevention.      Patient/caregiver response: Patient tolerated all treatment well today, with no signs of distress, excessive fatigue, or pain. Patient verbalizes understanding of all provided education and appears on track to meet her goals / exhibits improvement overall today.     Plan for next visit: Patient would benefit from continued skilled PT to address deficits in BLE strength, balance, endurance, gait, and to improve overall functional mobility. Progress with gait training and standing exercise as tolerated. Review and progress HEP appropriately. Review pressure relief strategies." [>50% of Time Spent on Counseling for ____] : Greater than 50% of the encounter time was spent on counseling for [unfilled] [Time Spent: ___ minutes] : I have spent [unfilled] minutes of face to face time with the patient

## 2024-03-22 NOTE — HOME HEALTH
[facility] HHOT Visit Note: 3/21/24    Pt presented seated on couch with CGs present.  Improved static and dynamic sitting w/o lumbar support, increased act tolerance, no new falls, no changes to meds, PCP, or insurance reported.  Mod indep transfer to and from couch. AMB with walker, no LOB OBS. Pt is waiting on clearnce to bathe from PCP as she has a new wound.  [facility]HS[facility] in for wound care. Able to access kitchen cabinets, kitchen sink. Conts to sleep on couch.  Pt would benefit from railing up staircase, MSW contacted.     Skill/education provided: HEP, UE strength, EC/WS, vitals, Pt education     Weight 91.6    Patient/caregiver response: engaged    Plan for next visit: shower safety in tranfers    Other pertinent info: fall risk

## 2024-03-25 ENCOUNTER — HOME CARE VISIT (OUTPATIENT)
Dept: HOME HEALTH SERVICES | Facility: HOME HEALTHCARE | Age: 84
End: 2024-03-25
Payer: MEDICARE

## 2024-03-25 PROCEDURE — G0157 HHC PT ASSISTANT EA 15: HCPCS

## 2024-03-25 NOTE — Clinical Note
Julia Abraham,     Mrs. Nelson is scheduled for a re-assessment next visit. Her hip is recovering well from her ARNOLDO and she appears to be on track to meet her goals. She has a pressure sore that has been causing her problems, nursing is treating it appropriately. She isn't overly motivated to progress with exercises and often self-limits.     Let me know if you have any questions. Patient informed. She verbalized understanding.

## 2024-03-26 VITALS — RESPIRATION RATE: 17 BRPM

## 2024-03-26 NOTE — HOME HEALTH
"Routine Visit Note: Greeted at door by patient, who was first seen ambulating with rolling walker, showing no apparent signs of distress. Patient states that she is feeling \"not too good\" and mentions that she feels lethargic today / reports regular compliance with her HEP as prescribed.     Skill/education provided: Instructed gait training and therapeutic exercise today. Educated patient on HEP, energy conservation strategies, and fall prevention.     Patient/caregiver response: Patient tolerated all treatment well today, with no signs of distress, excessive fatigue, or pain. Patient verbalizes understanding of all provided education and appears on track to meet her goals / exhibits improvement overall today despite reports of feeling lethargic.    Plan for next visit: Patient would benefit from continued skilled PT to address deficits in LE/hip strength, balance, and overall functional mobility. Progress as tolerated with exercise and gait training. Scheduled for re-assessment with supervising PT."

## 2024-03-27 ENCOUNTER — HOME CARE VISIT (OUTPATIENT)
Dept: HOME HEALTH SERVICES | Facility: HOME HEALTHCARE | Age: 84
End: 2024-03-27
Payer: MEDICARE

## 2024-03-27 VITALS
OXYGEN SATURATION: 98 % | SYSTOLIC BLOOD PRESSURE: 153 MMHG | DIASTOLIC BLOOD PRESSURE: 85 MMHG | HEART RATE: 85 BPM | RESPIRATION RATE: 16 BRPM

## 2024-03-27 PROCEDURE — G0151 HHCP-SERV OF PT,EA 15 MIN: HCPCS

## 2024-03-27 NOTE — HOME HEALTH
"PT reassessment complete. Pt has shown good progress with therapy interventions since initial evaluation; however, she remains below baseline with strength, balance and overall functional mobility. Pt states her goal is to \"walk on my own without walker.\" Pt is appropriate for continued skilled PT to progress towards goals."

## 2024-03-30 ENCOUNTER — HOME CARE VISIT (OUTPATIENT)
Dept: HOME HEALTH SERVICES | Facility: HOME HEALTHCARE | Age: 84
End: 2024-03-30
Payer: MEDICARE

## 2024-03-30 PROCEDURE — G0300 HHS/HOSPICE OF LPN EA 15 MIN: HCPCS

## 2024-03-31 VITALS
RESPIRATION RATE: 16 BRPM | HEART RATE: 89 BPM | DIASTOLIC BLOOD PRESSURE: 75 MMHG | TEMPERATURE: 98.2 F | OXYGEN SATURATION: 95 % | SYSTOLIC BLOOD PRESSURE: 152 MMHG

## 2024-03-31 NOTE — REMOTE PATIENT MONITORING NOTE
Routine Visit Note: LPN    Skill/education provided: Wound care to coccyx. Cleansed with sterile normal saline, applied medihoney to wound bed, covered with optifoam border dressing.           Patient/caregiver response: Patient and grandson verbalizes understanding of instructions.      Plan for next visit: Wound care to coccyx. Cleanse with sterile normal saline, apply medihoney to wound bed, cover with optifoam border dressing.      Other pertinent info: Dry scabb wound noted on  patient's left ankle. Measurements are  3.4 cm L x 2.7 cm W x 0 2 D

## 2024-04-02 ENCOUNTER — HOME CARE VISIT (OUTPATIENT)
Dept: HOME HEALTH SERVICES | Facility: HOME HEALTHCARE | Age: 84
End: 2024-04-02
Payer: MEDICARE

## 2024-04-02 VITALS
RESPIRATION RATE: 16 BRPM | OXYGEN SATURATION: 97 % | SYSTOLIC BLOOD PRESSURE: 150 MMHG | HEART RATE: 75 BPM | TEMPERATURE: 97.8 F | DIASTOLIC BLOOD PRESSURE: 72 MMHG

## 2024-04-02 PROCEDURE — G0157 HHC PT ASSISTANT EA 15: HCPCS

## 2024-04-02 NOTE — HOME HEALTH
"Routine Visit Note: Called into home by patient, who was first seen sitting on couch with no apparent signs of distress. Patient states that she is feeling \"pretty good today\" and reports \"some\" compliance with her HEP as prescribed.     Skill/education provided: Instructed gait training, therapeutic exercise, and balance training today. Educated patient on HEP, fall prevention, and pain management.      Patient/caregiver response: Patient tolerated all treatment well today, with no signs of distress, excessive fatigue, or pain. Patient verbalizes understanding of all provided education and appears on track to meet her goals / exhibits improvement overall today, tolerating all interventions with less shortness of breath / exhibiting increased BLE and hip strength/control during exercise.     Plan for next visit: Patient would benefit from continued skilled PT to address deficits in BLE/hip strength, balance, endurance, gait quality, and to improve overall functional mobility / to facilitate post-surgical healing/recovery. Review education and progress HEP as needed."

## 2024-04-03 ENCOUNTER — HOME CARE VISIT (OUTPATIENT)
Dept: HOME HEALTH SERVICES | Facility: HOME HEALTHCARE | Age: 84
End: 2024-04-03
Payer: MEDICARE

## 2024-04-03 VITALS
OXYGEN SATURATION: 96 % | DIASTOLIC BLOOD PRESSURE: 68 MMHG | SYSTOLIC BLOOD PRESSURE: 146 MMHG | TEMPERATURE: 98 F | RESPIRATION RATE: 16 BRPM | HEART RATE: 87 BPM

## 2024-04-03 PROCEDURE — G0300 HHS/HOSPICE OF LPN EA 15 MIN: HCPCS

## 2024-04-04 ENCOUNTER — HOME CARE VISIT (OUTPATIENT)
Dept: HOME HEALTH SERVICES | Facility: HOME HEALTHCARE | Age: 84
End: 2024-04-04
Payer: MEDICARE

## 2024-04-04 VITALS
OXYGEN SATURATION: 97 % | RESPIRATION RATE: 18 BRPM | SYSTOLIC BLOOD PRESSURE: 142 MMHG | HEART RATE: 90 BPM | DIASTOLIC BLOOD PRESSURE: 77 MMHG | TEMPERATURE: 98.7 F

## 2024-04-04 PROCEDURE — G0152 HHCP-SERV OF OT,EA 15 MIN: HCPCS

## 2024-04-05 NOTE — HOME HEALTH
Routine Visit Note: 4/4/24    Skill/education provided: Pt participated in UE TE/TA, declined shower today, reports will today with sister, using BSC as seat in walk in unit.  Pt vitals obtained.  Pt education provided.  Pt reports no change to PCP, meds, or insurance, no falls. Conts to see SN for wound on coccyx.    Patient/caregiver response: Engaged    Plan for next visit: Bathroom safety, HEP    Other pertinent info: fall risk, weighed 92.8, scalr needs to be calibrated.

## 2024-04-09 ENCOUNTER — HOME CARE VISIT (OUTPATIENT)
Dept: HOME HEALTH SERVICES | Facility: HOME HEALTHCARE | Age: 84
End: 2024-04-09
Payer: MEDICARE

## 2024-04-09 VITALS
RESPIRATION RATE: 16 BRPM | DIASTOLIC BLOOD PRESSURE: 66 MMHG | HEART RATE: 75 BPM | TEMPERATURE: 99.1 F | OXYGEN SATURATION: 99 % | SYSTOLIC BLOOD PRESSURE: 157 MMHG

## 2024-04-09 PROCEDURE — G0299 HHS/HOSPICE OF RN EA 15 MIN: HCPCS

## 2024-04-09 PROCEDURE — G0157 HHC PT ASSISTANT EA 15: HCPCS

## 2024-04-09 NOTE — HOME HEALTH
"Routine Visit Note: Called into home by patient, who was first seen sitting on couch with no apparent signs of distress. Patient states that she is feeling \"fair\" and reports regular compliance with her HEP as prescribed. Patient mentions \"I don't have any energy\" reporting generalized fatigue over the last few days.     Skill/education provided: Instructed gait training, therapeutic exercise, and balance training today. Educated patient on HEP, energy conservation/breathing techniques, fall prevention strategies, and pain management strategies.      Patient/caregiver response: Patient tolerated all treatment well today, with no signs of distress, excessive fatigue, or  exacerbation of pain / patients stating \" I feel pretty good\" at conclusion of treatment session. Patient verbalizes understanding of all provided education and appears on track to meet her goals / exhibits improvement overall today, progressing with all exercise and balance training/exhibiting less shortness of breath and requiring less frequent seated rest breaks to recover.     Plan for next visit: Patient would benefit from continued skilled PT to address deficits in BLE/hip strength, balance, endurance, gait quality, and to improve overall functional mobility/facilitate post-surgical healing. Progress with gait training with no AD in home/outside with walker weather permitting. Review HEP and reinforce education appropriately/prep for upcoming DC visit with supervising PT."

## 2024-04-10 VITALS
TEMPERATURE: 97.9 F | RESPIRATION RATE: 20 BRPM | SYSTOLIC BLOOD PRESSURE: 150 MMHG | DIASTOLIC BLOOD PRESSURE: 73 MMHG | OXYGEN SATURATION: 95 % | HEART RATE: 89 BPM

## 2024-04-11 ENCOUNTER — HOME CARE VISIT (OUTPATIENT)
Dept: HOME HEALTH SERVICES | Facility: HOME HEALTHCARE | Age: 84
End: 2024-04-11
Payer: MEDICARE

## 2024-04-11 NOTE — HOME HEALTH
"Routine Visit Note: On arrival to patient's home, she was laying on the cough. She reported she was feeling \"tired\" because she had just had PT. She was frail appearing.    Skill/education provided: T/I wound care. SN performed wound care. Measurements and photos of wound obtained. T/I pain management, medication education, home safety and fall prevention.    Patient/caregiver response: Patient tolerated wound care with mild discomfort. She participated in conversation regarding education. Ongoing need for SN for wound care and education related to wound care.    Plan for next visit: T/I/perform wound care.    Other pertinent info: Wound care supplies needed. Will place order."

## 2024-04-11 NOTE — HOME HEALTH
F/u appt scheduled.    TERELL.4/11   SN initial eval assessment    Patient has stage 2 pressure injury on coccyx over bony prominence. Recommending medihoney to wound bed daily and covering with optifoam pad. Granddaughter educated on wound care and verbalizes understanding. SN to see patient weekly until healed. SUpplies ordered.

## 2024-04-15 ENCOUNTER — HOME CARE VISIT (OUTPATIENT)
Dept: HOME HEALTH SERVICES | Facility: HOME HEALTHCARE | Age: 84
End: 2024-04-15
Payer: MEDICARE

## 2024-04-15 VITALS
SYSTOLIC BLOOD PRESSURE: 149 MMHG | DIASTOLIC BLOOD PRESSURE: 75 MMHG | OXYGEN SATURATION: 95 % | HEART RATE: 86 BPM | RESPIRATION RATE: 16 BRPM | TEMPERATURE: 97.7 F

## 2024-04-15 PROCEDURE — G0157 HHC PT ASSISTANT EA 15: HCPCS

## 2024-04-15 RX ORDER — UMECLIDINIUM BROMIDE AND VILANTEROL TRIFENATATE 62.5; 25 UG/1; UG/1
POWDER RESPIRATORY (INHALATION) DAILY
Qty: 60 EACH | OUTPATIENT
Start: 2024-04-15

## 2024-04-15 NOTE — HOME HEALTH
"Routine Visit Note: Greeted at door by patient, who was first seen sitting on couch with no apparent signs of distress. Patient states that he is feeling \"not too good, I'm pretty tired\" and reports \"some\" compliance with her HEP as prescribed.     Skill/education provided: Instructed gait training, therapeutic exercise, and balance training today. Educated patient on HEP, energy conservation/breathing techniques, pain management, and fall prevention strategies.     Patient/caregiver response: Patient tolerated all treatment well today, with no signs of distress, excessive fatigue, or pain. Patient verbalizes understanding of all provided education and appears on track to meet her goals / exhibits improvement overall today, progressing with all exercises and ambulating outdoors on non-compliant surfaces / tolerating increased workload overall before requiring a seated rest break.     Plan for next visit: Patient would benefit from continued skilled PT to address deficits in LE/hip strength, balance, endurance, gait quality, and to improve overall functional mobility / increase independence and safety overall with ADL's and ambulation / to facilitate post surgical healing. Scheduled for re-assessment next visit with supervising PT, progress as tolerated and review education as needed."

## 2024-04-15 NOTE — Clinical Note
Tracey Holder is Scheduled on you next visit for re-assessment. She appears on track to meet her goals and appears to be nearing her baseline level of functional mobility post ARNOLDO. She has been ambulating mostly with the rolling walker, but has been able to ambulate multiple laps in the home with her cane/occasionally no AD with assistance. She is agreeable to continue with PT until nursing is done treating her wounds, but is not overly-motivated to exercise/may be appropriate to DC if you see fit. She is minimally compliant with her home exercises, but ambulates frequently throughout the day.     Let me know if you have any questions!     -Loco

## 2024-04-17 ENCOUNTER — HOME CARE VISIT (OUTPATIENT)
Dept: HOME HEALTH SERVICES | Facility: HOME HEALTHCARE | Age: 84
End: 2024-04-17
Payer: MEDICARE

## 2024-04-18 ENCOUNTER — HOME CARE VISIT (OUTPATIENT)
Dept: HOME HEALTH SERVICES | Facility: HOME HEALTHCARE | Age: 84
End: 2024-04-18
Payer: MEDICARE

## 2024-04-18 PROCEDURE — G0299 HHS/HOSPICE OF RN EA 15 MIN: HCPCS

## 2024-04-20 VITALS
SYSTOLIC BLOOD PRESSURE: 135 MMHG | TEMPERATURE: 98.4 F | OXYGEN SATURATION: 98 % | RESPIRATION RATE: 20 BRPM | DIASTOLIC BLOOD PRESSURE: 66 MMHG | HEART RATE: 93 BPM

## 2024-04-20 NOTE — HOME HEALTH
Routine Visit Note:    Skill/education provided: SN performed wound care/assessment/measurements and photos obtained.    Patient/caregiver response: Patient tolerated well.     Plan for next visit: SN to perform wound care/assessment/measurements and photos obtained.    Other pertinent info: N/A

## 2024-04-22 ENCOUNTER — HOME CARE VISIT (OUTPATIENT)
Dept: HOME HEALTH SERVICES | Facility: HOME HEALTHCARE | Age: 84
End: 2024-04-22
Payer: MEDICARE

## 2024-04-22 VITALS
OXYGEN SATURATION: 97 % | RESPIRATION RATE: 16 BRPM | HEART RATE: 108 BPM | DIASTOLIC BLOOD PRESSURE: 57 MMHG | SYSTOLIC BLOOD PRESSURE: 143 MMHG

## 2024-04-22 PROCEDURE — G0151 HHCP-SERV OF PT,EA 15 MIN: HCPCS

## 2024-04-23 ENCOUNTER — HOME CARE VISIT (OUTPATIENT)
Dept: HOME HEALTH SERVICES | Facility: HOME HEALTHCARE | Age: 84
End: 2024-04-23
Payer: MEDICARE

## 2024-04-23 PROCEDURE — G0152 HHCP-SERV OF OT,EA 15 MIN: HCPCS

## 2024-04-23 PROCEDURE — G0299 HHS/HOSPICE OF RN EA 15 MIN: HCPCS

## 2024-04-24 ENCOUNTER — HOME CARE VISIT (OUTPATIENT)
Dept: HOME HEALTH SERVICES | Facility: HOME HEALTHCARE | Age: 84
End: 2024-04-24
Payer: MEDICARE

## 2024-04-24 VITALS
DIASTOLIC BLOOD PRESSURE: 88 MMHG | RESPIRATION RATE: 20 BRPM | OXYGEN SATURATION: 96 % | TEMPERATURE: 98.6 F | HEART RATE: 91 BPM | SYSTOLIC BLOOD PRESSURE: 141 MMHG

## 2024-04-24 NOTE — HOME HEALTH
60 Day Summary  Home Health need continues for: SN  Primary diagnoses/co-morbidities/recent procedures in past 60 days that impact current episode: Pressure injury stage to coccyx requiring ongoing SN need for wound care/assessments.  Current level of functional ability: Standby by/contact guard assist. Patient ambulates with walker.   Homebound status and living arrangements: Patient lives in multi-level home with family members.  Goals accomplished and/or measurable progress toward unmet goals in past 60 days: No falls reported. No medication reported side effects/issues.   Focus of care for next 60 days for each discipline ordered: T/I wound care/assessments, prevention of skin breakdown, medication education, home safety and fall prevention, pain management.  Skin integrity/wound status: Pressure injury coccyx.  Estimated date when home care services will end 60 days.  SDOH changes/barriers (i.e. Caregiver availability, social isolation, environment, income, transportation access, food insecurity etc.) No  Need for MSW referral? N  Plan for next visit T/I wound care/assessments, prevention of skin breakdown, medication education, home safety and fall prevention, pain management.

## 2024-04-25 VITALS
WEIGHT: 95.13 LBS | RESPIRATION RATE: 20 BRPM | SYSTOLIC BLOOD PRESSURE: 148 MMHG | OXYGEN SATURATION: 96 % | BODY MASS INDEX: 15.83 KG/M2 | DIASTOLIC BLOOD PRESSURE: 73 MMHG | HEART RATE: 108 BPM | TEMPERATURE: 98.4 F

## 2024-04-25 NOTE — HOME HEALTH
Discharge Summary/Summary of Care Provided: OT DC 4/23/24    Pt is being discharged from [facility] with goals met, conts with [facility] for wound care.    [facility] DC: 4/23/24    Patient received HEP, pt education, EC/WS, ADL support/edu, TE/TA to increase strength, indep and safety, precautions and restriction review.    For Reference: OT Eval in home 2/26 with Cg present    Pt is an 82 yo female, /p L partial hip replacement secondary to fracture and subseqent surgery.  Pt declines STR after hospital stay, retuned home and has sister in home to assist.  Pt reports indep PLOF.  Pt currenly using RW.  OBS transfer, pt educated on hip precautions, vitals assessed.  Pt will benefit from skilled OT in home 1wk4.  Pt denies recent falls since home, no changes to PCP, meds, or insurance reported.      OT freq 1wk4 Pt agreeable    Vitals assessed per agency guidelines    Pt is a fall risk    ALS assist, seated bathing, dressing LEs, low activity tolerance

## 2024-04-30 ENCOUNTER — HOME CARE VISIT (OUTPATIENT)
Dept: HOME HEALTH SERVICES | Facility: HOME HEALTHCARE | Age: 84
End: 2024-04-30
Payer: MEDICARE

## 2024-04-30 VITALS
OXYGEN SATURATION: 97 % | TEMPERATURE: 98.2 F | DIASTOLIC BLOOD PRESSURE: 80 MMHG | SYSTOLIC BLOOD PRESSURE: 154 MMHG | HEART RATE: 100 BPM | RESPIRATION RATE: 16 BRPM

## 2024-04-30 PROCEDURE — G0299 HHS/HOSPICE OF RN EA 15 MIN: HCPCS

## 2024-05-03 NOTE — HOME HEALTH
Routine Visit Note: LPN    Skill/education provided: Wound care to coccyx. Cleansed with sterile normal saline, applied medihoney to wound bed, covered with optifoam border dressing.           Patient/caregiver response: Patient verbalized understanding of instructions.      Plan for next visit: Wound care to coccyx. Cleanse with sterile normal saline, apply medihoney to wound bed, cover with optifoam border dressing.      Other pertinent info: N/A

## 2024-05-08 ENCOUNTER — HOME CARE VISIT (OUTPATIENT)
Dept: HOME HEALTH SERVICES | Facility: HOME HEALTHCARE | Age: 84
End: 2024-05-08
Payer: MEDICARE

## 2024-05-08 VITALS
RESPIRATION RATE: 18 BRPM | SYSTOLIC BLOOD PRESSURE: 146 MMHG | TEMPERATURE: 97.8 F | DIASTOLIC BLOOD PRESSURE: 60 MMHG | OXYGEN SATURATION: 98 % | HEART RATE: 97 BPM

## 2024-05-08 PROCEDURE — G0300 HHS/HOSPICE OF LPN EA 15 MIN: HCPCS

## 2024-05-15 ENCOUNTER — HOME CARE VISIT (OUTPATIENT)
Dept: HOME HEALTH SERVICES | Facility: HOME HEALTHCARE | Age: 84
End: 2024-05-15
Payer: MEDICARE

## 2024-05-15 PROCEDURE — G0299 HHS/HOSPICE OF RN EA 15 MIN: HCPCS

## 2024-05-16 VITALS
RESPIRATION RATE: 20 BRPM | DIASTOLIC BLOOD PRESSURE: 85 MMHG | HEART RATE: 97 BPM | TEMPERATURE: 98.5 F | OXYGEN SATURATION: 97 % | SYSTOLIC BLOOD PRESSURE: 158 MMHG

## 2024-05-22 ENCOUNTER — HOME CARE VISIT (OUTPATIENT)
Dept: HOME HEALTH SERVICES | Facility: HOME HEALTHCARE | Age: 84
End: 2024-05-22
Payer: MEDICARE

## 2024-05-22 VITALS
RESPIRATION RATE: 16 BRPM | DIASTOLIC BLOOD PRESSURE: 64 MMHG | OXYGEN SATURATION: 96 % | SYSTOLIC BLOOD PRESSURE: 125 MMHG | TEMPERATURE: 98.3 F | HEART RATE: 87 BPM

## 2024-05-22 PROCEDURE — G0300 HHS/HOSPICE OF LPN EA 15 MIN: HCPCS

## 2024-05-23 NOTE — HOME HEALTH
Routine Visit Note: LPN    Skill/education provided: Wound assessment/measurements/photos. Assessed knowledge of education related to home safety and fall prevention, medication education, Assessed psychosocial status. Assessed patient's skin and instructed on s/s and prevention of pressure related injuries and breakdown.     Patient/caregiver response: Verbalized knowledge of home safety and fall prevention, medication education, and psychosocial s/s using teach back method.    Plan for next visit: T/I risk for skin breakdown.    Other pertinent info: N/A

## 2024-05-30 ENCOUNTER — HOME CARE VISIT (OUTPATIENT)
Dept: HOME HEALTH SERVICES | Facility: HOME HEALTHCARE | Age: 84
End: 2024-05-30
Payer: MEDICARE

## 2024-05-30 VITALS
DIASTOLIC BLOOD PRESSURE: 70 MMHG | OXYGEN SATURATION: 97 % | TEMPERATURE: 97.9 F | HEART RATE: 9 BPM | RESPIRATION RATE: 16 BRPM | SYSTOLIC BLOOD PRESSURE: 149 MMHG

## 2024-05-30 PROCEDURE — G0300 HHS/HOSPICE OF LPN EA 15 MIN: HCPCS

## 2024-06-03 NOTE — HOME HEALTH
Routine Visit Note: LPN    Skill/education provided: Assessed knowledge related to home safety and fall prevention. Reviewed and instructed on medication to include dose, frequency. purpose and side effects.       Assessed   Assessed patient's skin and instructed on s/s and prevention of pressure related injuries and breakdown. Patient's wound is healed.    Patient/caregiver response: Verbalized knowledge of home safety, fall prevention, and medication management using the teach back method.    Plan for next visit: Plans for discharge.    Other pertinent info: N/A Unknown

## 2024-06-04 ENCOUNTER — HOME CARE VISIT (OUTPATIENT)
Dept: HOME HEALTH SERVICES | Facility: HOME HEALTHCARE | Age: 84
End: 2024-06-04
Payer: MEDICARE

## 2024-06-04 VITALS
RESPIRATION RATE: 20 BRPM | SYSTOLIC BLOOD PRESSURE: 157 MMHG | OXYGEN SATURATION: 9 % | DIASTOLIC BLOOD PRESSURE: 66 MMHG | HEART RATE: 93 BPM | TEMPERATURE: 97.7 F

## 2024-06-04 PROCEDURE — G0495 RN CARE TRAIN/EDU IN HH: HCPCS

## 2024-06-04 NOTE — Clinical Note
Please forward to Leola Wright MD.    Patient discharged to home/self care from home health services on 6/4/24 with all goals met.

## 2024-06-04 NOTE — HOME HEALTH
Discharge Decision: Discharge    Plan for discharge: Discharge to home/self care.    Barriers to discharge: None    Ongoing needs: None    Any referrals needed: No    Any declines in outcomes: discuss and document reason/Order received to discharge without goals met? All goals met.    Teaching needed prior to discharge: None    Assign DC notice responsibility: SN    Assign OASIS discharge responsibility: SN

## 2024-07-10 NOTE — HOME HEALTH
Routine Visit Note:    Skill/education provided: Wound assessment/measurements/photos. Assessed knowledge of education related to home safety and fall prevention, medication education, psychosocial s/s.    Patient/caregiver response: Verbalized knowledge of home safety and fall prevention, medication education, and psychosocial s/s using teach back method.    Plan for next visit: T/I risk for skin breakdown.    Other pertinent info: N/A
Statement Selected

## 2024-10-10 ENCOUNTER — TELEPHONE (OUTPATIENT)
Dept: SURGERY | Facility: CLINIC | Age: 84
End: 2024-10-10
Payer: MEDICARE

## 2024-10-10 NOTE — TELEPHONE ENCOUNTER
LVM for patient to call the office regarding a referral to Dr. Juárez from [facility] for anemia, rectal bleed, colonoscopy eval-1st attempt.

## 2024-10-11 NOTE — TELEPHONE ENCOUNTER
Patient states she does not want to schedule an appointment at this time and she will call back if she decides to schedule.

## 2025-02-21 ENCOUNTER — APPOINTMENT (OUTPATIENT)
Dept: GENERAL RADIOLOGY | Facility: HOSPITAL | Age: 85
End: 2025-02-21
Payer: MEDICARE

## 2025-02-21 ENCOUNTER — APPOINTMENT (OUTPATIENT)
Dept: CT IMAGING | Facility: HOSPITAL | Age: 85
End: 2025-02-21
Payer: MEDICARE

## 2025-02-21 ENCOUNTER — HOSPITAL ENCOUNTER (INPATIENT)
Facility: HOSPITAL | Age: 85
LOS: 1 days | Discharge: HOME | End: 2025-02-25
Attending: STUDENT IN AN ORGANIZED HEALTH CARE EDUCATION/TRAINING PROGRAM | Admitting: STUDENT IN AN ORGANIZED HEALTH CARE EDUCATION/TRAINING PROGRAM
Payer: MEDICARE

## 2025-02-21 DIAGNOSIS — A41.9 SEPSIS, DUE TO UNSPECIFIED ORGANISM, UNSPECIFIED WHETHER ACUTE ORGAN DYSFUNCTION PRESENT: ICD-10-CM

## 2025-02-21 DIAGNOSIS — Z78.9 IMPAIRED MOBILITY AND ADLS: ICD-10-CM

## 2025-02-21 DIAGNOSIS — Z74.09 IMPAIRED MOBILITY AND ADLS: ICD-10-CM

## 2025-02-21 DIAGNOSIS — J18.9 PNEUMONIA OF LEFT LOWER LOBE DUE TO INFECTIOUS ORGANISM: Primary | ICD-10-CM

## 2025-02-21 DIAGNOSIS — R41.0 TRANSIENT CONFUSION: ICD-10-CM

## 2025-02-21 LAB
ALBUMIN SERPL-MCNC: 3.1 G/DL (ref 3.5–5.2)
ALBUMIN/GLOB SERPL: 0.6 G/DL
ALP SERPL-CCNC: 134 U/L (ref 39–117)
ALT SERPL W P-5'-P-CCNC: 22 U/L (ref 1–33)
AMMONIA BLD-SCNC: <10 UMOL/L (ref 11–51)
ANION GAP SERPL CALCULATED.3IONS-SCNC: 12.4 MMOL/L (ref 5–15)
AST SERPL-CCNC: 21 U/L (ref 1–32)
BACTERIA UR QL AUTO: ABNORMAL /HPF
BASOPHILS # BLD AUTO: 0.05 10*3/MM3 (ref 0–0.2)
BASOPHILS NFR BLD AUTO: 0.4 % (ref 0–1.5)
BILIRUB SERPL-MCNC: 0.3 MG/DL (ref 0–1.2)
BILIRUB UR QL STRIP: NEGATIVE
BUN SERPL-MCNC: 18 MG/DL (ref 8–23)
BUN/CREAT SERPL: 36 (ref 7–25)
CALCIUM SPEC-SCNC: 11.4 MG/DL (ref 8.6–10.5)
CHLORIDE SERPL-SCNC: 96 MMOL/L (ref 98–107)
CLARITY UR: ABNORMAL
CO2 SERPL-SCNC: 24.6 MMOL/L (ref 22–29)
COLOR UR: YELLOW
CREAT SERPL-MCNC: 0.5 MG/DL (ref 0.57–1)
D-LACTATE SERPL-SCNC: 1.4 MMOL/L (ref 0.5–2)
D-LACTATE SERPL-SCNC: 2.3 MMOL/L (ref 0.5–2)
DEPRECATED RDW RBC AUTO: 56.1 FL (ref 37–54)
EGFRCR SERPLBLD CKD-EPI 2021: 92 ML/MIN/1.73
EOSINOPHIL # BLD AUTO: 0.06 10*3/MM3 (ref 0–0.4)
EOSINOPHIL NFR BLD AUTO: 0.5 % (ref 0.3–6.2)
ERYTHROCYTE [DISTWIDTH] IN BLOOD BY AUTOMATED COUNT: 17.3 % (ref 12.3–15.4)
FLUAV SUBTYP SPEC NAA+PROBE: NOT DETECTED
FLUBV RNA ISLT QL NAA+PROBE: NOT DETECTED
GEN 5 1HR TROPONIN T REFLEX: 51 NG/L
GLOBULIN UR ELPH-MCNC: 5.4 GM/DL
GLUCOSE SERPL-MCNC: 182 MG/DL (ref 65–99)
GLUCOSE UR STRIP-MCNC: NEGATIVE MG/DL
HCT VFR BLD AUTO: 41.7 % (ref 34–46.6)
HGB BLD-MCNC: 13 G/DL (ref 12–15.9)
HGB UR QL STRIP.AUTO: NEGATIVE
HYALINE CASTS UR QL AUTO: ABNORMAL /LPF
IMM GRANULOCYTES # BLD AUTO: 0.06 10*3/MM3 (ref 0–0.05)
IMM GRANULOCYTES NFR BLD AUTO: 0.5 % (ref 0–0.5)
KETONES UR QL STRIP: NEGATIVE
LEUKOCYTE ESTERASE UR QL STRIP.AUTO: ABNORMAL
LYMPHOCYTES # BLD AUTO: 1.04 10*3/MM3 (ref 0.7–3.1)
LYMPHOCYTES NFR BLD AUTO: 8.3 % (ref 19.6–45.3)
MCH RBC QN AUTO: 27.5 PG (ref 26.6–33)
MCHC RBC AUTO-ENTMCNC: 31.2 G/DL (ref 31.5–35.7)
MCV RBC AUTO: 88.3 FL (ref 79–97)
MONOCYTES # BLD AUTO: 0.99 10*3/MM3 (ref 0.1–0.9)
MONOCYTES NFR BLD AUTO: 7.9 % (ref 5–12)
NEUTROPHILS NFR BLD AUTO: 10.29 10*3/MM3 (ref 1.7–7)
NEUTROPHILS NFR BLD AUTO: 82.4 % (ref 42.7–76)
NITRITE UR QL STRIP: NEGATIVE
NRBC BLD AUTO-RTO: 0 /100 WBC (ref 0–0.2)
PH UR STRIP.AUTO: 6.5 [PH] (ref 5–8)
PLATELET # BLD AUTO: 442 10*3/MM3 (ref 140–450)
PMV BLD AUTO: 9.1 FL (ref 6–12)
POTASSIUM SERPL-SCNC: 3.6 MMOL/L (ref 3.5–5.2)
PROCALCITONIN SERPL-MCNC: 0.08 NG/ML (ref 0–0.25)
PROT SERPL-MCNC: 8.5 G/DL (ref 6–8.5)
PROT UR QL STRIP: ABNORMAL
RBC # BLD AUTO: 4.72 10*6/MM3 (ref 3.77–5.28)
RBC # UR STRIP: ABNORMAL /HPF
REF LAB TEST METHOD: ABNORMAL
SARS-COV-2 RNA RESP QL NAA+PROBE: NOT DETECTED
SODIUM SERPL-SCNC: 133 MMOL/L (ref 136–145)
SP GR UR STRIP: 1.02 (ref 1–1.03)
SQUAMOUS #/AREA URNS HPF: ABNORMAL /HPF
T4 FREE SERPL-MCNC: 1.27 NG/DL (ref 0.92–1.68)
TROPONIN T % DELTA: 59
TROPONIN T NUMERIC DELTA: 19 NG/L
TROPONIN T SERPL HS-MCNC: 32 NG/L
TSH SERPL DL<=0.05 MIU/L-ACNC: 5.43 UIU/ML (ref 0.27–4.2)
UROBILINOGEN UR QL STRIP: ABNORMAL
WBC # UR STRIP: ABNORMAL /HPF
WBC NRBC COR # BLD AUTO: 12.49 10*3/MM3 (ref 3.4–10.8)

## 2025-02-21 PROCEDURE — 87636 SARSCOV2 & INF A&B AMP PRB: CPT | Performed by: STUDENT IN AN ORGANIZED HEALTH CARE EDUCATION/TRAINING PROGRAM

## 2025-02-21 PROCEDURE — 82140 ASSAY OF AMMONIA: CPT | Performed by: STUDENT IN AN ORGANIZED HEALTH CARE EDUCATION/TRAINING PROGRAM

## 2025-02-21 PROCEDURE — 83605 ASSAY OF LACTIC ACID: CPT | Performed by: STUDENT IN AN ORGANIZED HEALTH CARE EDUCATION/TRAINING PROGRAM

## 2025-02-21 PROCEDURE — G0378 HOSPITAL OBSERVATION PER HR: HCPCS

## 2025-02-21 PROCEDURE — 81001 URINALYSIS AUTO W/SCOPE: CPT | Performed by: STUDENT IN AN ORGANIZED HEALTH CARE EDUCATION/TRAINING PROGRAM

## 2025-02-21 PROCEDURE — 84484 ASSAY OF TROPONIN QUANT: CPT | Performed by: STUDENT IN AN ORGANIZED HEALTH CARE EDUCATION/TRAINING PROGRAM

## 2025-02-21 PROCEDURE — 25010000002 CEFTRIAXONE PER 250 MG: Performed by: STUDENT IN AN ORGANIZED HEALTH CARE EDUCATION/TRAINING PROGRAM

## 2025-02-21 PROCEDURE — 93005 ELECTROCARDIOGRAM TRACING: CPT | Performed by: STUDENT IN AN ORGANIZED HEALTH CARE EDUCATION/TRAINING PROGRAM

## 2025-02-21 PROCEDURE — 99285 EMERGENCY DEPT VISIT HI MDM: CPT | Performed by: STUDENT IN AN ORGANIZED HEALTH CARE EDUCATION/TRAINING PROGRAM

## 2025-02-21 PROCEDURE — 84439 ASSAY OF FREE THYROXINE: CPT | Performed by: STUDENT IN AN ORGANIZED HEALTH CARE EDUCATION/TRAINING PROGRAM

## 2025-02-21 PROCEDURE — 25010000002 AZITHROMYCIN PER 500 MG: Performed by: STUDENT IN AN ORGANIZED HEALTH CARE EDUCATION/TRAINING PROGRAM

## 2025-02-21 PROCEDURE — 70450 CT HEAD/BRAIN W/O DYE: CPT

## 2025-02-21 PROCEDURE — 71250 CT THORAX DX C-: CPT

## 2025-02-21 PROCEDURE — 71045 X-RAY EXAM CHEST 1 VIEW: CPT

## 2025-02-21 PROCEDURE — 25810000003 SODIUM CHLORIDE 0.9 % SOLUTION: Performed by: STUDENT IN AN ORGANIZED HEALTH CARE EDUCATION/TRAINING PROGRAM

## 2025-02-21 PROCEDURE — 25810000003 SODIUM CHLORIDE 0.9 % SOLUTION 250 ML FLEX CONT: Performed by: STUDENT IN AN ORGANIZED HEALTH CARE EDUCATION/TRAINING PROGRAM

## 2025-02-21 PROCEDURE — 87040 BLOOD CULTURE FOR BACTERIA: CPT | Performed by: STUDENT IN AN ORGANIZED HEALTH CARE EDUCATION/TRAINING PROGRAM

## 2025-02-21 PROCEDURE — 84145 PROCALCITONIN (PCT): CPT | Performed by: STUDENT IN AN ORGANIZED HEALTH CARE EDUCATION/TRAINING PROGRAM

## 2025-02-21 PROCEDURE — 36415 COLL VENOUS BLD VENIPUNCTURE: CPT

## 2025-02-21 PROCEDURE — 87086 URINE CULTURE/COLONY COUNT: CPT | Performed by: STUDENT IN AN ORGANIZED HEALTH CARE EDUCATION/TRAINING PROGRAM

## 2025-02-21 PROCEDURE — 87077 CULTURE AEROBIC IDENTIFY: CPT | Performed by: STUDENT IN AN ORGANIZED HEALTH CARE EDUCATION/TRAINING PROGRAM

## 2025-02-21 PROCEDURE — 80053 COMPREHEN METABOLIC PANEL: CPT | Performed by: STUDENT IN AN ORGANIZED HEALTH CARE EDUCATION/TRAINING PROGRAM

## 2025-02-21 PROCEDURE — 99291 CRITICAL CARE FIRST HOUR: CPT

## 2025-02-21 PROCEDURE — 87186 SC STD MICRODIL/AGAR DIL: CPT | Performed by: STUDENT IN AN ORGANIZED HEALTH CARE EDUCATION/TRAINING PROGRAM

## 2025-02-21 PROCEDURE — 25010000002 ENOXAPARIN PER 10 MG: Performed by: STUDENT IN AN ORGANIZED HEALTH CARE EDUCATION/TRAINING PROGRAM

## 2025-02-21 PROCEDURE — 84443 ASSAY THYROID STIM HORMONE: CPT | Performed by: STUDENT IN AN ORGANIZED HEALTH CARE EDUCATION/TRAINING PROGRAM

## 2025-02-21 PROCEDURE — 85025 COMPLETE CBC W/AUTO DIFF WBC: CPT | Performed by: STUDENT IN AN ORGANIZED HEALTH CARE EDUCATION/TRAINING PROGRAM

## 2025-02-21 RX ORDER — ENOXAPARIN SODIUM 100 MG/ML
30 INJECTION SUBCUTANEOUS DAILY
Status: DISCONTINUED | OUTPATIENT
Start: 2025-02-21 | End: 2025-02-25 | Stop reason: HOSPADM

## 2025-02-21 RX ORDER — SODIUM CHLORIDE 9 MG/ML
40 INJECTION, SOLUTION INTRAVENOUS AS NEEDED
Status: DISCONTINUED | OUTPATIENT
Start: 2025-02-21 | End: 2025-02-25 | Stop reason: HOSPADM

## 2025-02-21 RX ORDER — SODIUM CHLORIDE 0.9 % (FLUSH) 0.9 %
10 SYRINGE (ML) INJECTION EVERY 12 HOURS SCHEDULED
Status: DISCONTINUED | OUTPATIENT
Start: 2025-02-21 | End: 2025-02-25 | Stop reason: HOSPADM

## 2025-02-21 RX ORDER — SODIUM CHLORIDE 9 MG/ML
75 INJECTION, SOLUTION INTRAVENOUS CONTINUOUS
Status: DISCONTINUED | OUTPATIENT
Start: 2025-02-21 | End: 2025-02-23

## 2025-02-21 RX ORDER — SODIUM CHLORIDE 0.9 % (FLUSH) 0.9 %
10 SYRINGE (ML) INJECTION AS NEEDED
Status: DISCONTINUED | OUTPATIENT
Start: 2025-02-21 | End: 2025-02-25 | Stop reason: HOSPADM

## 2025-02-21 RX ORDER — BISACODYL 5 MG/1
5 TABLET, DELAYED RELEASE ORAL DAILY PRN
Status: DISCONTINUED | OUTPATIENT
Start: 2025-02-21 | End: 2025-02-25 | Stop reason: HOSPADM

## 2025-02-21 RX ORDER — BISACODYL 10 MG
10 SUPPOSITORY, RECTAL RECTAL DAILY PRN
Status: DISCONTINUED | OUTPATIENT
Start: 2025-02-21 | End: 2025-02-25 | Stop reason: HOSPADM

## 2025-02-21 RX ORDER — POLYETHYLENE GLYCOL 3350 17 G/17G
17 POWDER, FOR SOLUTION ORAL DAILY PRN
Status: DISCONTINUED | OUTPATIENT
Start: 2025-02-21 | End: 2025-02-25 | Stop reason: HOSPADM

## 2025-02-21 RX ORDER — AMOXICILLIN 250 MG
2 CAPSULE ORAL 2 TIMES DAILY PRN
Status: DISCONTINUED | OUTPATIENT
Start: 2025-02-21 | End: 2025-02-25 | Stop reason: HOSPADM

## 2025-02-21 RX ADMIN — AZITHROMYCIN DIHYDRATE 500 MG: 500 INJECTION, POWDER, LYOPHILIZED, FOR SOLUTION INTRAVENOUS at 16:54

## 2025-02-21 RX ADMIN — CEFTRIAXONE SODIUM 2000 MG: 2 INJECTION, POWDER, FOR SOLUTION INTRAMUSCULAR; INTRAVENOUS at 16:21

## 2025-02-21 RX ADMIN — SODIUM CHLORIDE 500 ML: 900 INJECTION, SOLUTION INTRAVENOUS at 16:19

## 2025-02-21 RX ADMIN — SODIUM CHLORIDE 75 ML/HR: 9 INJECTION, SOLUTION INTRAVENOUS at 20:46

## 2025-02-21 NOTE — H&P
[facility]   HISTORY AND PHYSICAL      Name:  Tracey Nelson   Age:  84 y.o.  Sex:  female  :  1940  MRN:  [id number]   Visit Number:  [id number]  Admission Date:  2025  Date Of Service:  25  Primary Care Physician:  Leola Wright MD    Chief Complaint:     Altered mental status    History Of Presenting Illness:      Patient is a 84 year old female with PMH of HTN, hyperlipidemia, COPD, PAD presents to the ED with complaints of generalized weakness. Patient started having decreased energy starting Monday. Could not get up today. Patient reports some hallucinations which started today. She reports [place], met with Dr. Elliott. Denies any fever. She has a nonproductive chronic cough for the past 4 years.  She quit smoking in December. 2 weeks ago she was not eating, refused to go to hospital with EMS.  She has lost about 15 pounds in the last 3 months.    Review Of Systems:    All systems were reviewed and negative except as mentioned in history of presenting illness, assessment and plan.    Past Medical History: Patient  has a past medical history of Allergies, Bronchitis, Cataract, bilateral, Coronary artery disease, Hyperlipidemia, LBBB (left bundle branch block) (2018), Myocardial infarction, PAD (peripheral artery disease), Pneumonia, Skin cancer, and Wears dentures.    Past Surgical History: Patient  has a past surgical history that includes Tonsillectomy; Carotid Endarterectomy (Left); Other surgical history (Bilateral); Cataract extraction w/ intraocular lens  implant, bilateral; Skin cancer excision; Colonoscopy; Skin biopsy; Dilation and curettage, diagnostic / therapeutic; and Hip Bipolar (Left, 2024).    Social History: Patient  reports that she has been smoking cigarettes. She has a 20 pack-year smoking history. She has never used smokeless tobacco. Drug use questions deferred to the physician. She reports that she  does not drink alcohol.    Family History:  Patient's family history has been reviewed and found to be noncontributory.     Allergies:      Patient has no known allergies.    Home Medications:    Prior to Admission Medications       Prescriptions Last Dose Informant Patient Reported? Taking?    amLODIPine (NORVASC) 2.5 MG tablet  Self Yes No    Take 2.5 mg by mouth every night at bedtime. Indications: High Blood Pressure Disorder    Aspirin 81 MG capsule  Self Yes No    Take 1 tablet by mouth Daily. Indications: Arthritis    busPIRone (BUSPAR) 10 MG tablet  Self Yes No    buspirone 10 mg tablet   Two times a day as needed    cilostazol (PLETAL) 50 MG tablet  Self Yes No    Take 1 tablet by mouth 2 (Two) Times a Day. Indications: Temporary Stroke    cyanocobalamin (VITAMIN B-12) 250 MCG tablet  Self Yes No    Vitamin B-12 250 mcg tablet Take 1 Daily    escitalopram (LEXAPRO) 10 MG tablet   Yes No    Take 1 tablet by mouth Daily. Indications: Major Depressive Disorder    Fluticasone-Umeclidin-Vilant (Trelegy Ellipta) 200-62.5-25 MCG/ACT aerosol powder   Self Yes No    Inhale 1 puff Daily With Lunch. Indications: Asthma    losartan (COZAAR) 50 MG tablet  Self Yes No    Take 1 tablet by mouth Daily. Indications: High Blood Pressure Disorder    simvastatin (ZOCOR) 40 MG tablet  Self Yes No    Take 1 tablet by mouth every night at bedtime. Indications: High Amount of Fats in the Blood          ED Medications:    Medications   azithromycin (ZITHROMAX) 500 mg in sodium chloride 0.9 % 250 mL IVPB-VTB (500 mg Intravenous New Bag 2/21/25 6429)   sodium chloride 0.9 % flush 10 mL (has no administration in time range)   sodium chloride 0.9 % flush 10 mL (has no administration in time range)   sodium chloride 0.9 % infusion 40 mL (has no administration in time range)   Enoxaparin Sodium (LOVENOX) syringe 30 mg (has no administration in time range)   sennosides-docusate (PERICOLACE) 8.6-50 MG per tablet 2 tablet (has no  "administration in time range)     And   polyethylene glycol (MIRALAX) packet 17 g (has no administration in time range)     And   bisacodyl (DULCOLAX) EC tablet 5 mg (has no administration in time range)     And   bisacodyl (DULCOLAX) suppository 10 mg (has no administration in time range)   cefTRIAXone (ROCEPHIN) 2,000 mg in sodium chloride 0.9 % 100 mL IVPB-VTB (0 mg Intravenous Stopped 2/21/25 1654)   sodium chloride 0.9 % bolus 500 mL (0 mL Intravenous Stopped 2/21/25 1741)     Vital Signs:  Temp:  [97.4 °F (36.3 °C)] 97.4 °F (36.3 °C)  Heart Rate:  [] 89  Resp:  [20] 20  BP: (153-199)/(65-89) 158/65        02/21/25  1312   Weight: 38.1 kg (84 lb)     Body mass index is 13.98 kg/m².    Physical Exam:     Most recent vital Signs: /65   Pulse 89   Temp 97.4 °F (36.3 °C) (Oral)   Resp 20   Ht 165.1 cm (65\")   Wt 38.1 kg (84 lb)   SpO2 93%   BMI 13.98 kg/m²     Physical Exam  Constitutional:       Appearance: She is ill-appearing.   HENT:      Head: Normocephalic and atraumatic.      Mouth/Throat:      Mouth: Mucous membranes are moist.   Eyes:      Pupils: Pupils are equal, round, and reactive to light.   Cardiovascular:      Rate and Rhythm: Normal rate and regular rhythm.   Pulmonary:      Breath sounds: Rhonchi (left sided) present.   Abdominal:      General: Abdomen is flat.      Palpations: Abdomen is soft.   Musculoskeletal:         General: Normal range of motion.   Skin:     General: Skin is warm and dry.   Neurological:      Mental Status: She is alert. She is disoriented.      Cranial Nerves: No cranial nerve deficit.      Sensory: No sensory deficit.         Laboratory data:    I have reviewed the labs done in the emergency room.    Results from last 7 days   Lab Units 02/21/25  1352   SODIUM mmol/L 133*   POTASSIUM mmol/L 3.6   CHLORIDE mmol/L 96*   CO2 mmol/L 24.6   BUN mg/dL 18   CREATININE mg/dL 0.50*   CALCIUM mg/dL 11.4*   BILIRUBIN mg/dL 0.3   ALK PHOS U/L 134*   ALT (SGPT) U/L " 22   AST (SGOT) U/L 21   GLUCOSE mg/dL 182*     Results from last 7 days   Lab Units 02/21/25  1352   WBC 10*3/mm3 12.49*   HEMOGLOBIN g/dL 13.0   HEMATOCRIT % 41.7   PLATELETS 10*3/mm3 442         Results from last 7 days   Lab Units 02/21/25  1504 02/21/25  1352   HSTROP T ng/L 51* 32*                     Results from last 7 days   Lab Units 02/21/25  1345   COLOR UA  Yellow   GLUCOSE UA  Negative   KETONES UA  Negative   BLOOD UA  Negative   LEUKOCYTES UA  Small (1+)*   PH, URINE  6.5   BILIRUBIN UA  Negative   UROBILINOGEN UA  1.0 E.U./dL   RBC UA /HPF 0-2   WBC UA /HPF 6-10*       Pain Management Panel           No data to display                    Radiology:    CT Chest Without Contrast Diagnostic    Result Date: 2/21/2025  PROCEDURE: CT CHEST WO CONTRAST DIAGNOSTIC-  HISTORY: LLQ infiltrate  COMPARISON: Previous day.  PROCEDURE:  Multiple axial CT images were obtained from the thoracic inlet through the upper abdomen without the use of contrast.  FINDINGS: There is left hilar enlargement.. Heart size is normal. The aorta is normal in caliber. Interval worsening of left lower lobe pneumonia. There is opacification of the left lower lobe bronchus, endobronchial lesion is not excluded. There is a small left pleural effusion. No pericardial or right pleural effusion. There are changes of emphysema. The visualized upper abdomen is unremarkable. Bone windows reveal no acute osseous abnormalities.      Extensive left lower lobe pneumonia with possible postobstructive change, consider follow-up bronchoscopy.  Small left pleural effusion.  Left hilar adenopathy suspected.   This study was performed with techniques to keep radiation doses as low as reasonably achievable (ALARA). Individualized dose reduction techniques using automated exposure control or adjustment of mA and/or kV according to the patient size were employed.    Images were reviewed, interpreted, and dictated by Dr. Ahsan Hui MD Transcribed by [place]  PAUL Cervantes.  This report was signed and finalized on 2/21/2025 4:37 PM by Ahsan Hui MD.      CT Head Without Contrast    Result Date: 2/21/2025  PROCEDURE: CT HEAD WO CONTRAST-  HISTORY: Transient confusion  COMPARISON: 2/19/2024.  TECHNIQUE: Multiple axial CT images were performed from the foramen magnum to the vertex. Individualized dose reduction techniques using automated exposure control or adjustment of mA and/or kV according to the patient size were employed.  FINDINGS: There is moderate generalized cerebral atrophy. The ventricles are enlarged. There are moderate white matter changes consistent with chronic microvascular ischemic disease. There is no evidence of edema or hemorrhage.  No masses are identified. No extra-axial fluid is seen. The paranasal sinuses are unremarkable.      Atrophy and chronic change without acute process.        Images were reviewed, interpreted, and dictated by Dr. Ahsan Hui MD Transcribed by Dallas Cervantes PA-C.  This report was signed and finalized on 2/21/2025 4:37 PM by Ahsan Hui MD.      XR Chest 1 View    Result Date: 2/21/2025  PROCEDURE: XR CHEST 1 VW-  HISTORY: AMS  COMPARISON: 2/20/2024.  FINDINGS: The heart is normal in size. The mediastinum is unremarkable. There is a stable left lower lobe opacity compatible with pleural effusion and underlying pneumonia. The right lung is clear. There is no pneumothorax.  There are no acute osseous abnormalities.      No significant interval change.     Images were reviewed, interpreted, and dictated by Dr. Ahsan Hui MD Transcribed by Dallas Cervantes PA-C.  This report was signed and finalized on 2/21/2025 3:01 PM by Ahsan Hui MD.       Assessment:    Sepsis on admission  Left lower lobe pneumonia  Acute metabolic encephalopathy  Lactic acidosis  Hypercalcemia --> Ca 11.4  NSTEMI trop 32 --> 51, EKG no ischemic changes  Hypertension  Hyperlipidemia  Generalized anxiety disorder  Peripheral artery  disease    Plan:    WBC 12.5 and tachycardiac on admission 2/4 SIRS  Lactic acid 2.3 --> 1.4  CT chest shows extensive LLL pneumonia with possible postobstructive  Empiric antibiotics Rocephin/Azithromycin  Blood cultures pending  IV fluids  Consider pulm consult for bronchoscopy if no improvement  CT brain shows no acute abnormality, Ammonia WNL  Resume home meds  Monitor mental status    Risk Assessment: High  DVT Prophylaxis: Lovenox  Code Status: Full  Diet: Cardiac    Advance Care Planning   ACP discussion was held with the patient during this visit. Patient has an advance directive in EMR which is still valid.            Alfie Castillo MD  02/21/25  18:32 EST    Dictated utilizing Dragon dictation.

## 2025-02-21 NOTE — ED PROVIDER NOTES
"     [facility]  Emergency Department Encounter  Emergency Medicine Physician Note       Pt Name: Tracey Nelson  MRN: [id number]  Pt :   1940  Room Number:  10/10  Date of encounter:  2025  PCP: Leola Wright MD  ED Physician: Stevan Russell DO    HPI:  Tracey Nelson is a 84 y.o. female who presents to the ED with chief complaint of confusion. Patient is accompanied by her grandkids/caregivers at bedside to contribute to HPI.  Patient has been experiencing generalized weakness and fatigue for the past several days.  Last night developed confusion and hallucinations.  Described as \"seeing things and hearing things that are not there\".  Patient thought that she was gambling.  This prompted ED evaluation today.  Patient states that she feels better upon arrival.  He is currently alert and orient x 4.  Reports associated nonproductive cough.  Denies fever, chills, headache, chest pain, shortness of breath, abdominal or back pain, problems with urination or bowel movements, leg pain or swelling.    PAST MEDICAL HISTORY  Past Medical History:   Diagnosis Date    Allergies     Bronchitis     Cataract, bilateral     s/p surgery    Coronary artery disease     Hyperlipidemia     LBBB (left bundle branch block) 2018    Myocardial infarction     pt denies    PAD (peripheral artery disease)     stent in each leg    Pneumonia     Skin cancer     Wears dentures     upper plate and lower is a partial     Current Outpatient Medications   Medication Instructions    amLODIPine (NORVASC) 2.5 mg, Oral, Every Night at Bedtime    Aspirin 81 MG capsule Take 1 tablet by mouth Daily. Indications: Arthritis    busPIRone (BUSPAR) 10 MG tablet buspirone 10 mg tablet   Two times a day as needed    cilostazol (PLETAL) 50 MG tablet 1 tablet, Oral, 2 Times Daily    cyanocobalamin (VITAMIN B-12) 250 MCG tablet Vitamin B-12 250 mcg tablet Take 1 Daily    escitalopram (LEXAPRO) 10 MG tablet 1 tablet, Oral, Daily    " Fluticasone-Umeclidin-Vilant (Trelegy Ellipta) 200-62.5-25 MCG/ACT aerosol powder  1 puff, Inhalation, Daily With Lunch    losartan (COZAAR) 50 MG tablet 1 tablet, Oral, Daily    simvastatin (ZOCOR) 40 MG tablet 1 tablet, Oral, Every Night at Bedtime      PAST SURGICAL HISTORY  Past Surgical History:   Procedure Laterality Date    CAROTID ENDARTERECTOMY Left     CATARACT EXTRACTION W/ INTRAOCULAR LENS  IMPLANT, BILATERAL      COLONOSCOPY      DILATION AND CURETTAGE, DIAGNOSTIC / THERAPEUTIC      HIP BIPOLAR REPLACEMENT Left 2/20/2024    Procedure: HIP BIPOLAR REPLACEMENT LEFT;  Surgeon: Cedrick Gomez MD;  Location: [facility];  Service: Orthopedics;  Laterality: Left;    OTHER SURGICAL HISTORY Bilateral     stent in each leg    SKIN BIOPSY      SKIN CANCER EXCISION      TONSILLECTOMY         FAMILY HISTORY  History reviewed. No pertinent family history.    SOCIAL HISTORY  Social History     Socioeconomic History    Marital status:    Tobacco Use    Smoking status: Some Days     Current packs/day: 0.50     Average packs/day: 0.5 packs/day for 40.0 years (20.0 ttl pk-yrs)     Types: Cigarettes    Smokeless tobacco: Never   Vaping Use    Vaping status: Never Used   Substance and Sexual Activity    Alcohol use: Never    Drug use: Defer    Sexual activity: Defer     ALLERGIES  Patient has no known allergies.    REVIEW OF SYSTEMS  All systems reviewed and negative except for those discussed in HPI.     PHYSICAL EXAM  ED Triage Vitals [02/21/25 1312]   Temp Heart Rate Resp BP SpO2   97.4 °F (36.3 °C) 116 20 (!) 199/89 97 %      Temp src Heart Rate Source Patient Position BP Location FiO2 (%)   Oral Monitor Sitting Left arm --     I have reviewed the triage vital signs and nursing notes.    General: Frail-appearing elderly female.  Alert.  Appears chronically ill, but nontoxic.  No acute distress.  Head: Normocephalic.  Atraumatic.  Eyes: No scleral icterus.  ENT: Moist mucous membranes.  Cardiovascular:  Regular rate and rhythm.  No murmurs.  No rubs.  2+ distal pulses bilaterally.  Respiratory: Decreased air entry left lung base. No wheezing. No rales.  No rhonchi.  GI: Abdomen is soft.  Nondistended.  Nontender to palpation.  No rebound.  No guarding.  No CVA tenderness.  MSK: Moves all 4 extremities.  Neurologic: Oriented x 3.  No focal deficits.  Skin: No edema. No erythema. No pallor. No cyanosis.  Psych: Normal mood and affect.    LAB RESULTS  Recent Results (from the past 24 hours)   COVID-19 and FLU A/B PCR, 1 HR TAT - Swab, Nasopharynx    Collection Time: 02/21/25  1:38 PM    Specimen: Nasopharynx; Swab   Result Value Ref Range    COVID19 Not Detected Not Detected - Ref. Range    Influenza A PCR Not Detected Not Detected    Influenza B PCR Not Detected Not Detected   Urinalysis With Culture If Indicated - Urine, Clean Catch    Collection Time: 02/21/25  1:45 PM    Specimen: Urine, Clean Catch   Result Value Ref Range    Color, UA Yellow Yellow, Straw    Appearance, UA Cloudy (A) Clear    pH, UA 6.5 5.0 - 8.0    Specific Gravity, UA 1.021 1.005 - 1.030    Glucose, UA Negative Negative    Ketones, UA Negative Negative    Bilirubin, UA Negative Negative    Blood, UA Negative Negative    Protein,  mg/dL (2+) (A) Negative    Leuk Esterase, UA Small (1+) (A) Negative    Nitrite, UA Negative Negative    Urobilinogen, UA 1.0 E.U./dL 0.2 - 1.0 E.U./dL   Urinalysis, Microscopic Only - Urine, Clean Catch    Collection Time: 02/21/25  1:45 PM    Specimen: Urine, Clean Catch   Result Value Ref Range    RBC, UA 0-2 None Seen, 0-2 /HPF    WBC, UA 6-10 (A) None Seen, 0-2 /HPF    Bacteria, UA 3+ (A) None Seen /HPF    Squamous Epithelial Cells, UA 3-6 (A) None Seen, 0-2 /HPF    Hyaline Casts, UA None Seen None Seen /LPF    Methodology Manual Light Microscopy    TSH Rfx On Abnormal To Free T4    Collection Time: 02/21/25  1:52 PM    Specimen: Blood   Result Value Ref Range    TSH 5.430 (H) 0.270 - 4.200 uIU/mL   Ammonia     Collection Time: 02/21/25  1:52 PM    Specimen: Blood   Result Value Ref Range    Ammonia <10 (L) 11 - 51 umol/L   High Sensitivity Troponin T    Collection Time: 02/21/25  1:52 PM    Specimen: Blood   Result Value Ref Range    HS Troponin T 32 (H) <14 ng/L   Comprehensive Metabolic Panel    Collection Time: 02/21/25  1:52 PM    Specimen: Blood   Result Value Ref Range    Glucose 182 (H) 65 - 99 mg/dL    BUN 18 8 - 23 mg/dL    Creatinine 0.50 (L) 0.57 - 1.00 mg/dL    Sodium 133 (L) 136 - 145 mmol/L    Potassium 3.6 3.5 - 5.2 mmol/L    Chloride 96 (L) 98 - 107 mmol/L    CO2 24.6 22.0 - 29.0 mmol/L    Calcium 11.4 (H) 8.6 - 10.5 mg/dL    Total Protein 8.5 6.0 - 8.5 g/dL    Albumin 3.1 (L) 3.5 - 5.2 g/dL    ALT (SGPT) 22 1 - 33 U/L    AST (SGOT) 21 1 - 32 U/L    Alkaline Phosphatase 134 (H) 39 - 117 U/L    Total Bilirubin 0.3 0.0 - 1.2 mg/dL    Globulin 5.4 gm/dL    A/G Ratio 0.6 g/dL    BUN/Creatinine Ratio 36.0 (H) 7.0 - 25.0    Anion Gap 12.4 5.0 - 15.0 mmol/L    eGFR 92.0 >60.0 mL/min/1.73   CBC Auto Differential    Collection Time: 02/21/25  1:52 PM    Specimen: Blood   Result Value Ref Range    WBC 12.49 (H) 3.40 - 10.80 10*3/mm3    RBC 4.72 3.77 - 5.28 10*6/mm3    Hemoglobin 13.0 12.0 - 15.9 g/dL    Hematocrit 41.7 34.0 - 46.6 %    MCV 88.3 79.0 - 97.0 fL    MCH 27.5 26.6 - 33.0 pg    MCHC 31.2 (L) 31.5 - 35.7 g/dL    RDW 17.3 (H) 12.3 - 15.4 %    RDW-SD 56.1 (H) 37.0 - 54.0 fl    MPV 9.1 6.0 - 12.0 fL    Platelets 442 140 - 450 10*3/mm3    Neutrophil % 82.4 (H) 42.7 - 76.0 %    Lymphocyte % 8.3 (L) 19.6 - 45.3 %    Monocyte % 7.9 5.0 - 12.0 %    Eosinophil % 0.5 0.3 - 6.2 %    Basophil % 0.4 0.0 - 1.5 %    Immature Grans % 0.5 0.0 - 0.5 %    Neutrophils, Absolute 10.29 (H) 1.70 - 7.00 10*3/mm3    Lymphocytes, Absolute 1.04 0.70 - 3.10 10*3/mm3    Monocytes, Absolute 0.99 (H) 0.10 - 0.90 10*3/mm3    Eosinophils, Absolute 0.06 0.00 - 0.40 10*3/mm3    Basophils, Absolute 0.05 0.00 - 0.20 10*3/mm3    Immature  Grans, Absolute 0.06 (H) 0.00 - 0.05 10*3/mm3    nRBC 0.0 0.0 - 0.2 /100 WBC   T4, Free    Collection Time: 02/21/25  1:52 PM    Specimen: Blood   Result Value Ref Range    Free T4 1.27 0.92 - 1.68 ng/dL   Lactic Acid, Plasma    Collection Time: 02/21/25  1:52 PM    Specimen: Blood   Result Value Ref Range    Lactate 2.3 (C) 0.5 - 2.0 mmol/L   Procalcitonin    Collection Time: 02/21/25  1:52 PM    Specimen: Blood   Result Value Ref Range    Procalcitonin 0.08 0.00 - 0.25 ng/mL   High Sensitivity Troponin T 1Hr    Collection Time: 02/21/25  3:04 PM    Specimen: Blood   Result Value Ref Range    HS Troponin T 51 (H) <14 ng/L    Troponin T Numeric Delta 19 (C) Abnormal if >/=3 ng/L    Troponin T % Delta 59 (C) Abnormal if >/= 20%       RADIOLOGY  CT Chest Without Contrast Diagnostic    Result Date: 2/21/2025  PROCEDURE: CT CHEST WO CONTRAST DIAGNOSTIC-  HISTORY: LLQ infiltrate  COMPARISON: Previous day.  PROCEDURE:  Multiple axial CT images were obtained from the thoracic inlet through the upper abdomen without the use of contrast.  FINDINGS: There is left hilar enlargement.. Heart size is normal. The aorta is normal in caliber. Interval worsening of left lower lobe pneumonia. There is opacification of the left lower lobe bronchus, endobronchial lesion is not excluded. There is a small left pleural effusion. No pericardial or right pleural effusion. There are changes of emphysema. The visualized upper abdomen is unremarkable. Bone windows reveal no acute osseous abnormalities.      Extensive left lower lobe pneumonia with possible postobstructive change, consider follow-up bronchoscopy.  Small left pleural effusion.  Left hilar adenopathy suspected.   This study was performed with techniques to keep radiation doses as low as reasonably achievable (ALARA). Individualized dose reduction techniques using automated exposure control or adjustment of mA and/or kV according to the patient size were employed.    Images were  reviewed, interpreted, and dictated by Dr. Ahsan Hui MD Transcribed by Dallas Cervantes PA-C.  This report was signed and finalized on 2/21/2025 4:37 PM by Ahsan Hui MD.      CT Head Without Contrast    Result Date: 2/21/2025  PROCEDURE: CT HEAD WO CONTRAST-  HISTORY: Transient confusion  COMPARISON: 2/19/2024.  TECHNIQUE: Multiple axial CT images were performed from the foramen magnum to the vertex. Individualized dose reduction techniques using automated exposure control or adjustment of mA and/or kV according to the patient size were employed.  FINDINGS: There is moderate generalized cerebral atrophy. The ventricles are enlarged. There are moderate white matter changes consistent with chronic microvascular ischemic disease. There is no evidence of edema or hemorrhage.  No masses are identified. No extra-axial fluid is seen. The paranasal sinuses are unremarkable.      Atrophy and chronic change without acute process.        Images were reviewed, interpreted, and dictated by Dr. Ahsan Hui MD Transcribed by Dallas Cervantes PA-C.  This report was signed and finalized on 2/21/2025 4:37 PM by Ahsan Hui MD.      XR Chest 1 View    Result Date: 2/21/2025  PROCEDURE: XR CHEST 1 VW-  HISTORY: AMS  COMPARISON: 2/20/2024.  FINDINGS: The heart is normal in size. The mediastinum is unremarkable. There is a stable left lower lobe opacity compatible with pleural effusion and underlying pneumonia. The right lung is clear. There is no pneumothorax.  There are no acute osseous abnormalities.      No significant interval change.     Images were reviewed, interpreted, and dictated by Dr. Ahsan Hui MD Transcribed by Dallas Cervantes PA-C.  This report was signed and finalized on 2/21/2025 3:01 PM by Ahsan Hui MD.       PROCEDURES  Critical Care    Performed by: Stevan Russell DO  Authorized by: Stevan Russell DO    Comments:      CRITICAL CARE PROCEDURE NOTE  Authorized and performed by: Dr. Russell    Total  critical care time: Approximately 36 minutes.    Patient was critically ill due to: Pneumonia, sepsis    Interventions: IV fluids, IV antibiotics, close airway/mental status/hemodynamic monitoring.    Due to a high probability of clinically significant, life threatening deterioration, the patient required my highest level of preparedness to intervene emergently and I personally spent this critical care time directly and personally managing the patient.  This critical care time included obtaining a history; examining the patient; pulse oximetry; ordering and review of studies; arranging urgent treatment with development of a management plan; evaluation of patient's response to treatment; frequent reassessment; and, discussions with other providers.    This critical care time was performed to assess and manage the high probability of imminent, life-threatening deterioration that could result in multiorgan failure.  It was exclusive of separately billable procedures, treating other patients and teaching time.    Please see MDM section and the rest of the note for further information on patient assessment and interventions.      RISK STRATIFICATION    MEDICAL DECISION MAKING  84 y.o. female with past medical history listed above who presents with generalized weakness, transient confusion, cough.    Vital signs remarkable for mild tachycardia, hypertension, pulse ox 94% on room air.    Based on clinical presentation and physical exam, differential diagnosis includes, but is not limited to, pneumonia, UTI, metabolic versus toxic encephalopathy, intracranial structural abnormality.    At least 3 different tests have been ordered on this patient.    Medications administered in ED:  Medications   azithromycin (ZITHROMAX) 500 mg in sodium chloride 0.9 % 250 mL IVPB-VTB (500 mg Intravenous New Bag 2/21/25 7042)   sodium chloride 0.9 % flush 10 mL (has no administration in time range)   sodium chloride 0.9 % flush 10 mL (has no  administration in time range)   sodium chloride 0.9 % infusion 40 mL (has no administration in time range)   Enoxaparin Sodium (LOVENOX) syringe 30 mg (has no administration in time range)   sennosides-docusate (PERICOLACE) 8.6-50 MG per tablet 2 tablet (has no administration in time range)     And   polyethylene glycol (MIRALAX) packet 17 g (has no administration in time range)     And   bisacodyl (DULCOLAX) EC tablet 5 mg (has no administration in time range)     And   bisacodyl (DULCOLAX) suppository 10 mg (has no administration in time range)   cefTRIAXone (ROCEPHIN) 2,000 mg in sodium chloride 0.9 % 100 mL IVPB-VTB (0 mg Intravenous Stopped 2/21/25 1654)   sodium chloride 0.9 % bolus 500 mL (0 mL Intravenous Stopped 2/21/25 1741)     Please see ED course below for my interpretation of the ED workup.  ED Course as of 02/21/25 1833   Fri Feb 21, 2025   1411 ECG 12 Lead Altered Mental Status  EKG per my interpretation normal sinus rhythm, rate 96, normal axis, left bundle branch block with QRS duration 120 ms, ST segment depression lateral precordial leads, normal QTc interval.    EKG is unchanged from 2/19/2024.   [JS]   1424 XR Chest 1 View  I have independently reviewed and interpreted the chest x-ray.  My interpretation is left lower lobe infiltrate.    [JS]   1703 I reviewed the labs listed above.     Notable findings are highlighted below.    Old laboratory data was reviewed from the medical records and compared to today's results.   [JS]   1704 WBC(!): 12.49 [JS]   1704 HS Troponin T(!): 32 [JS]   1704 HS Troponin T(!): 51 [JS]   1704 Troponin T Numeric Delta(!!): 19 [JS]   1704 Lactate(!!): 2.3 [JS]   1704 TSH Baseline(!): 5.430 [JS]   1704 Free T4: 1.27 [JS]   1704 Urinalysis With Culture If Indicated - Urine, Clean Catch(!)  Likely contaminated sample. [JS]   1704 Blood, UA: Negative [JS]   1704 Leukocytes, UA(!): Small (1+) [JS]   1704 WBC, UA(!): 6-10 [JS]   1704 Squamous Epithelial Cells, UA(!): 3-6  [JS]   170 I have independently reviewed and interpreted the imaging listed above.  My interpretations are listed below:    -CT head negative for intracranial hemorrhage or mass effect.    -CT chest left lower lobe pneumonia.    Radiologist notes the following: Extensive left lower lobe pneumonia with possible postobstructive change, consider follow-up bronchoscopy.  Small left pleural effusion.  Left hilar adenopathy suspected.     [JS]      ED Course User Index  [JS] Stevan Russell DO     On re-evaluation, patient resting comfortably.  Vital signs remained stable on room air.  Patient was covered with empiric IV antibiotics. Will proceed with medical admission for further workup and management.  I discussed the findings of the ED workup with the patient including my recommendation for admission.  Patient agreeable with plan and disposition.    Case discussed with Dr. Langston (hospitalist) who agrees to evaluate and admit the patient.  We discussed the HPI, pertinent PMHx, ED course and workup.    REPEAT VITAL SIGNS  AS OF 18:33 EST VITALS:  BP - 158/65  HR - 89  TEMP - 97.4 °F (36.3 °C) (Oral)  O2 SATS - 93%    DIAGNOSIS  Final diagnoses:   Pneumonia of left lower lobe due to infectious organism   Sepsis, due to unspecified organism, unspecified whether acute organ dysfunction present   Transient confusion     DISPOSITION  ED Disposition       ED Disposition   Decision to Admit    Condition   --    Comment   Level of Care: Med/Surg [1]   Diagnosis: Pneumonia [555747]   Admitting Physician: CHANDA LANGSTON [[id number]]   Attending Physician: CHANDA LANGSTON [[id number]]               Please note that portions of this document were completed with voice recognition software.        Stevan Russell DO  02/22/25 9292

## 2025-02-22 LAB
ALBUMIN SERPL-MCNC: 2.4 G/DL (ref 3.5–5.2)
ALBUMIN/GLOB SERPL: 0.5 G/DL
ALP SERPL-CCNC: 106 U/L (ref 39–117)
ALT SERPL W P-5'-P-CCNC: 15 U/L (ref 1–33)
ANION GAP SERPL CALCULATED.3IONS-SCNC: 11.8 MMOL/L (ref 5–15)
AST SERPL-CCNC: 15 U/L (ref 1–32)
BASOPHILS # BLD AUTO: 0.03 10*3/MM3 (ref 0–0.2)
BASOPHILS NFR BLD AUTO: 0.3 % (ref 0–1.5)
BILIRUB SERPL-MCNC: 0.3 MG/DL (ref 0–1.2)
BUN SERPL-MCNC: 9 MG/DL (ref 8–23)
BUN/CREAT SERPL: 20 (ref 7–25)
CALCIUM SPEC-SCNC: 9.8 MG/DL (ref 8.6–10.5)
CHLORIDE SERPL-SCNC: 100 MMOL/L (ref 98–107)
CO2 SERPL-SCNC: 21.2 MMOL/L (ref 22–29)
CREAT SERPL-MCNC: 0.45 MG/DL (ref 0.57–1)
DEPRECATED RDW RBC AUTO: 58.2 FL (ref 37–54)
EGFRCR SERPLBLD CKD-EPI 2021: 94.4 ML/MIN/1.73
EOSINOPHIL # BLD AUTO: 0.04 10*3/MM3 (ref 0–0.4)
EOSINOPHIL NFR BLD AUTO: 0.4 % (ref 0.3–6.2)
ERYTHROCYTE [DISTWIDTH] IN BLOOD BY AUTOMATED COUNT: 17.6 % (ref 12.3–15.4)
GLOBULIN UR ELPH-MCNC: 4.4 GM/DL
GLUCOSE SERPL-MCNC: 112 MG/DL (ref 65–99)
HCT VFR BLD AUTO: 36.9 % (ref 34–46.6)
HGB BLD-MCNC: 11.3 G/DL (ref 12–15.9)
IMM GRANULOCYTES # BLD AUTO: 0.05 10*3/MM3 (ref 0–0.05)
IMM GRANULOCYTES NFR BLD AUTO: 0.4 % (ref 0–0.5)
LYMPHOCYTES # BLD AUTO: 0.9 10*3/MM3 (ref 0.7–3.1)
LYMPHOCYTES NFR BLD AUTO: 8.1 % (ref 19.6–45.3)
MCH RBC QN AUTO: 27.8 PG (ref 26.6–33)
MCHC RBC AUTO-ENTMCNC: 30.6 G/DL (ref 31.5–35.7)
MCV RBC AUTO: 90.7 FL (ref 79–97)
MONOCYTES # BLD AUTO: 1.24 10*3/MM3 (ref 0.1–0.9)
MONOCYTES NFR BLD AUTO: 11.1 % (ref 5–12)
NEUTROPHILS NFR BLD AUTO: 79.7 % (ref 42.7–76)
NEUTROPHILS NFR BLD AUTO: 8.9 10*3/MM3 (ref 1.7–7)
NRBC BLD AUTO-RTO: 0 /100 WBC (ref 0–0.2)
PLATELET # BLD AUTO: 333 10*3/MM3 (ref 140–450)
PMV BLD AUTO: 9.1 FL (ref 6–12)
POTASSIUM SERPL-SCNC: 3.3 MMOL/L (ref 3.5–5.2)
PROT SERPL-MCNC: 6.8 G/DL (ref 6–8.5)
RBC # BLD AUTO: 4.07 10*6/MM3 (ref 3.77–5.28)
SODIUM SERPL-SCNC: 133 MMOL/L (ref 136–145)
TROPONIN T SERPL HS-MCNC: 26 NG/L
WBC NRBC COR # BLD AUTO: 11.16 10*3/MM3 (ref 3.4–10.8)

## 2025-02-22 PROCEDURE — 85025 COMPLETE CBC W/AUTO DIFF WBC: CPT | Performed by: STUDENT IN AN ORGANIZED HEALTH CARE EDUCATION/TRAINING PROGRAM

## 2025-02-22 PROCEDURE — G0378 HOSPITAL OBSERVATION PER HR: HCPCS

## 2025-02-22 PROCEDURE — 25010000002 AZITHROMYCIN PER 500 MG: Performed by: STUDENT IN AN ORGANIZED HEALTH CARE EDUCATION/TRAINING PROGRAM

## 2025-02-22 PROCEDURE — 84484 ASSAY OF TROPONIN QUANT: CPT | Performed by: STUDENT IN AN ORGANIZED HEALTH CARE EDUCATION/TRAINING PROGRAM

## 2025-02-22 PROCEDURE — 25810000003 SODIUM CHLORIDE 0.9 % SOLUTION: Performed by: STUDENT IN AN ORGANIZED HEALTH CARE EDUCATION/TRAINING PROGRAM

## 2025-02-22 PROCEDURE — 99232 SBSQ HOSP IP/OBS MODERATE 35: CPT | Performed by: INTERNAL MEDICINE

## 2025-02-22 PROCEDURE — 25010000002 CEFTRIAXONE PER 250 MG: Performed by: STUDENT IN AN ORGANIZED HEALTH CARE EDUCATION/TRAINING PROGRAM

## 2025-02-22 PROCEDURE — 80053 COMPREHEN METABOLIC PANEL: CPT | Performed by: STUDENT IN AN ORGANIZED HEALTH CARE EDUCATION/TRAINING PROGRAM

## 2025-02-22 PROCEDURE — 25810000003 SODIUM CHLORIDE 0.9 % SOLUTION 250 ML FLEX CONT: Performed by: STUDENT IN AN ORGANIZED HEALTH CARE EDUCATION/TRAINING PROGRAM

## 2025-02-22 RX ORDER — BUSPIRONE HYDROCHLORIDE 10 MG/1
10 TABLET ORAL EVERY 12 HOURS SCHEDULED
Status: DISCONTINUED | OUTPATIENT
Start: 2025-02-22 | End: 2025-02-25 | Stop reason: HOSPADM

## 2025-02-22 RX ORDER — ASPIRIN 81 MG/1
81 TABLET, CHEWABLE ORAL DAILY
Status: DISCONTINUED | OUTPATIENT
Start: 2025-02-22 | End: 2025-02-25 | Stop reason: HOSPADM

## 2025-02-22 RX ORDER — CILOSTAZOL 100 MG/1
50 TABLET ORAL 2 TIMES DAILY
Status: DISCONTINUED | OUTPATIENT
Start: 2025-02-22 | End: 2025-02-25 | Stop reason: HOSPADM

## 2025-02-22 RX ORDER — ARGININE/GLUTAMINE/CALCIUM BMB 7G-7G-1.5G
1 POWDER IN PACKET (EA) ORAL 2 TIMES DAILY WITH MEALS
Status: DISCONTINUED | OUTPATIENT
Start: 2025-02-22 | End: 2025-02-25 | Stop reason: HOSPADM

## 2025-02-22 RX ORDER — HYDRALAZINE HYDROCHLORIDE 10 MG/1
10 TABLET, FILM COATED ORAL EVERY 8 HOURS PRN
Status: DISCONTINUED | OUTPATIENT
Start: 2025-02-22 | End: 2025-02-25 | Stop reason: HOSPADM

## 2025-02-22 RX ORDER — ATORVASTATIN CALCIUM 20 MG/1
20 TABLET, FILM COATED ORAL DAILY
Status: DISCONTINUED | OUTPATIENT
Start: 2025-02-22 | End: 2025-02-25 | Stop reason: HOSPADM

## 2025-02-22 RX ORDER — LOSARTAN POTASSIUM 50 MG/1
50 TABLET ORAL DAILY
Status: DISCONTINUED | OUTPATIENT
Start: 2025-02-22 | End: 2025-02-25 | Stop reason: HOSPADM

## 2025-02-22 RX ORDER — AMLODIPINE BESYLATE 5 MG/1
2.5 TABLET ORAL
Status: DISCONTINUED | OUTPATIENT
Start: 2025-02-22 | End: 2025-02-24

## 2025-02-22 RX ADMIN — BUSPIRONE HYDROCHLORIDE 10 MG: 10 TABLET ORAL at 09:03

## 2025-02-22 RX ADMIN — Medication 10 ML: at 21:39

## 2025-02-22 RX ADMIN — AMLODIPINE BESYLATE 2.5 MG: 5 TABLET ORAL at 09:03

## 2025-02-22 RX ADMIN — SODIUM CHLORIDE 75 ML/HR: 9 INJECTION, SOLUTION INTRAVENOUS at 21:39

## 2025-02-22 RX ADMIN — ASPIRIN 81 MG CHEWABLE TABLET 81 MG: 81 TABLET CHEWABLE at 09:03

## 2025-02-22 RX ADMIN — SODIUM CHLORIDE 75 ML/HR: 9 INJECTION, SOLUTION INTRAVENOUS at 09:25

## 2025-02-22 RX ADMIN — Medication 10 ML: at 09:10

## 2025-02-22 RX ADMIN — ATORVASTATIN CALCIUM 20 MG: 40 TABLET, FILM COATED ORAL at 09:03

## 2025-02-22 RX ADMIN — CEFTRIAXONE 1000 MG: 1 INJECTION, POWDER, FOR SOLUTION INTRAMUSCULAR; INTRAVENOUS at 09:24

## 2025-02-22 RX ADMIN — CILOSTAZOL 50 MG: 100 TABLET ORAL at 21:44

## 2025-02-22 RX ADMIN — AZITHROMYCIN DIHYDRATE 500 MG: 500 INJECTION, POWDER, LYOPHILIZED, FOR SOLUTION INTRAVENOUS at 12:51

## 2025-02-22 RX ADMIN — CILOSTAZOL 50 MG: 100 TABLET ORAL at 09:03

## 2025-02-22 RX ADMIN — LOSARTAN POTASSIUM 50 MG: 50 TABLET, FILM COATED ORAL at 09:03

## 2025-02-22 RX ADMIN — BUSPIRONE HYDROCHLORIDE 10 MG: 10 TABLET ORAL at 21:44

## 2025-02-22 NOTE — NURSING NOTE
Received request for skin assessment due to presence of wounds. Primary Rn Radha assisted with wound assessment. Pt has multiple scattered abrasions and bruises in various stages of healing. All wounds present except one are abrasions. Pt has a S3PI to coccyx, Pt very thin with all bony prominences visible. Unable to apply dressing to pressure injury due to frequent episodes of incontinence. Barrier cream applied and repositioning with wedge encouraged per protocol All scabbed areas of abrasions protected to prevent reopening.  Standing orders for care include a turning schedule, barrier cream twice daily and prn after cleansing, using dry sheets in folds if moisture noted, float heels off bed with pillows or heel lift boots, encourage increase mobility as appropriate and tolerated to reduce pressure, for dry skin apply lotion/cream and a nutrition consult for dietary needs. Staff to contact provider and re-consult wound nurse for new skin issues or lack of improvement with current orders. Thank you for the consult. If you have questions or concerns do not hesitate to contact me.

## 2025-02-22 NOTE — PLAN OF CARE
Goal Outcome Evaluation:      Pt has been sleeping most of the day, no acute events. 2L NC applied while sleeping to maintain O2 sats >90%.       Problem: Adult Inpatient Plan of Care  Goal: Plan of Care Review  Outcome: Progressing  Goal: Patient-Specific Goal (Individualized)  Outcome: Progressing  Goal: Absence of Hospital-Acquired Illness or Injury  Outcome: Progressing  Intervention: Identify and Manage Fall Risk  Recent Flowsheet Documentation  Taken 2/22/2025 1600 by Mari Ortiz LPN  Safety Promotion/Fall Prevention:   safety round/check completed   room organization consistent  Taken 2/22/2025 1400 by Mari Ortiz LPN  Safety Promotion/Fall Prevention:   safety round/check completed   room organization consistent  Taken 2/22/2025 1200 by Mari Ortiz LPN  Safety Promotion/Fall Prevention:   safety round/check completed   room organization consistent  Taken 2/22/2025 1000 by Mari Ortiz LPN  Safety Promotion/Fall Prevention:   safety round/check completed   room organization consistent  Taken 2/22/2025 0830 by Mari Ortiz LPN  Safety Promotion/Fall Prevention:   safety round/check completed   room organization consistent  Intervention: Prevent Skin Injury  Recent Flowsheet Documentation  Taken 2/22/2025 0830 by Mari Ortiz LPN  Body Position: supine  Intervention: Prevent and Manage VTE (Venous Thromboembolism) Risk  Recent Flowsheet Documentation  Taken 2/22/2025 0830 by Mari Ortiz LPN  VTE Prevention/Management: patient refused intervention  Goal: Optimal Comfort and Wellbeing  Outcome: Progressing  Intervention: Provide Person-Centered Care  Recent Flowsheet Documentation  Taken 2/22/2025 0830 by Mari Ortiz LPN  Trust Relationship/Rapport:   care explained   questions answered   reassurance provided  Goal: Readiness for Transition of Care  Outcome: Progressing     Problem: Skin Injury Risk Increased  Goal: Skin Health and Integrity  Outcome:  Progressing  Intervention: Optimize Skin Protection  Recent Flowsheet Documentation  Taken 2/22/2025 1600 by Mari Ortiz LPN  Activity Management: activity encouraged  Head of Bed (HOB) Positioning: HOB elevated  Taken 2/22/2025 1400 by Mari Ortiz LPN  Activity Management: activity encouraged  Taken 2/22/2025 1200 by Mari Ortiz LPN  Activity Management: activity encouraged  Head of Bed (HOB) Positioning: HOB elevated  Taken 2/22/2025 1000 by Mari Ortiz LPN  Activity Management: activity encouraged  Taken 2/22/2025 0830 by Mari Ortiz LPN  Activity Management: activity encouraged  Head of Bed (HOB) Positioning: HOB elevated     Problem: Fall Injury Risk  Goal: Absence of Fall and Fall-Related Injury  Outcome: Progressing  Intervention: Identify and Manage Contributors  Recent Flowsheet Documentation  Taken 2/22/2025 1600 by Mari Ortiz LPN  Medication Review/Management: medications reviewed  Taken 2/22/2025 1200 by Mari Ortiz LPN  Medication Review/Management: medications reviewed  Taken 2/22/2025 0830 by Mari Ortiz LPN  Medication Review/Management: medications reviewed  Intervention: Promote Injury-Free Environment  Recent Flowsheet Documentation  Taken 2/22/2025 1600 by Mari Ortiz LPN  Safety Promotion/Fall Prevention:   safety round/check completed   room organization consistent  Taken 2/22/2025 1400 by Mari Otriz LPN  Safety Promotion/Fall Prevention:   safety round/check completed   room organization consistent  Taken 2/22/2025 1200 by Mari Ortiz LPN  Safety Promotion/Fall Prevention:   safety round/check completed   room organization consistent  Taken 2/22/2025 1000 by Mari Ortiz LPN  Safety Promotion/Fall Prevention:   safety round/check completed   room organization consistent  Taken 2/22/2025 0830 by Mari Ortiz LPN  Safety Promotion/Fall Prevention:   safety round/check completed   room organization  consistent

## 2025-02-22 NOTE — THERAPY EVALUATION
Hold PT evaluation this date per nursing as pt is too drowsy to participate safely. Will plan to check back tomorrow.

## 2025-02-22 NOTE — PLAN OF CARE
Problem: Adult Inpatient Plan of Care  Goal: Plan of Care Review  Outcome: Progressing  Flowsheets (Taken 2/22/2025 0527)  Plan of Care Reviewed With: patient  Goal: Patient-Specific Goal (Individualized)  Outcome: Progressing  Goal: Absence of Hospital-Acquired Illness or Injury  Outcome: Progressing  Intervention: Identify and Manage Fall Risk  Recent Flowsheet Documentation  Taken 2/22/2025 0400 by Zee Mobley RN  Safety Promotion/Fall Prevention: safety round/check completed  Intervention: Prevent Skin Injury  Recent Flowsheet Documentation  Taken 2/22/2025 0400 by Zee Mobley RN  Body Position: supine  Taken 2/22/2025 0100 by Zee Mobley RN  Skin Protection:   incontinence pads utilized   transparent dressing maintained  Intervention: Prevent Infection  Recent Flowsheet Documentation  Taken 2/22/2025 0400 by Zee Mobley RN  Infection Prevention: rest/sleep promoted  Goal: Optimal Comfort and Wellbeing  Outcome: Progressing  Intervention: Provide Person-Centered Care  Recent Flowsheet Documentation  Taken 2/22/2025 0100 by Zee Mobley RN  Trust Relationship/Rapport: care explained  Goal: Readiness for Transition of Care  Outcome: Progressing     Problem: Skin Injury Risk Increased  Goal: Skin Health and Integrity  Outcome: Progressing  Intervention: Optimize Skin Protection  Recent Flowsheet Documentation  Taken 2/22/2025 0400 by Zee Mobley RN  Head of Bed (HOB) Positioning: HOB elevated  Taken 2/22/2025 0100 by Zee Mobley RN  Pressure Reduction Techniques:   frequent weight shift encouraged   weight shift assistance provided   pressure points protected  Pressure Reduction Devices:   elbow protectors utilized   foam padding utilized   positioning supports utilized  Skin Protection:   incontinence pads utilized   transparent dressing maintained   Goal Outcome Evaluation:  Plan of Care Reviewed With: patient

## 2025-02-22 NOTE — CASE MANAGEMENT/SOCIAL WORK
Discharge Planning Assessment  [facility]     Patient Name: Tracey Nelson  MRN: [id number]  Today's Date: 2/22/2025    Admit Date: 2/21/2025    Plan: DCP- Per son/Ang Nelson- Home with family.   Discharge Needs Assessment       Row Name 02/22/25 4040       Living Environment    People in Home grandchild(patsy)    Name(s) of People in Home Huseyin/Grandson    Current Living Arrangements home    Duration at Residence Over 30 years per ez/Ang    Potentially Unsafe Housing Conditions unable to assess    In the past 12 months has the electric, gas, oil, or water company threatened to shut off services in your home? Pt Unable    Primary Care Provided by self    Provides Primary Care For no one, unable/limited ability to care for self    Family Caregiver if Needed grandchild(patsy), adult    Family Caregiver Names Huseyin/Grandson    Quality of Family Relationships unable to assess    Able to Return to Prior Arrangements yes       Resource/Environmental Concerns    Resource/Environmental Concerns none    Transportation Concerns none       Transportation Needs    In the past 12 months, has lack of transportation kept you from medical appointments or from getting medications? Pt Unable    In the past 12 months, has lack of transportation kept you from meetings, work, or from getting things needed for daily living? Pt Unable       Food Insecurity    Within the past 12 months, you worried that your food would run out before you got the money to buy more. Pt Unable    Within the past 12 months, the food you bought just didn't last and you didn't have money to get more. Pt Unable       Transition Planning    Patient/Family Anticipates Transition to home with family  Per Tay Nelson    Patient/Family Anticipated Services at Transition     Transportation Anticipated family or friend will provide       Discharge Needs Assessment    Readmission Within the Last 30 Days no previous admission in last 30 days    Equipment  "Currently Used at Home walker, sarina;commode;bath bench  Per son/Ang    Concerns to be Addressed discharge planning    Do you want help finding or keeping work or a job? Patient unable to answer    Anticipated Changes Related to Illness inability to care for self    Equipment Needed After Discharge other (see comments)  To be determined.    Outpatient/Agency/Support Group Needs other (see comments)  To be determined.    Provided Post Acute Provider List? N/A                   Discharge Plan       Row Name 25 [id number]       Plan    Plan DCP Per Tay Nelson Home with family.    Plan Comments CM spoke with pt at bedside. Pt unable to answer any questions at this time. Spoke with Tay Nelson per phone. Son confirmed name,,address,and insurance. Yes to LW, No POA and No  service. PCP confirmed as Leola Wright , last seen \"about 3-4 months ago\" Pharmacy used [facility]. Agreed to med to bed. DME listed above. Pt Lives with grandson/Huseyin. Son states Requires more assistance with ADL's and with her mobility. Has a walker and uses at home. DCP- Home with family. Denies any needs at this time.                  Continued Care and Services - Admitted Since 2025    No active coordination exists for this encounter.          Demographic Summary       Row Name 25 6171       General Information    Admission Type observation    Arrived From emergency department    Required Notices Provided Observation Status Notice    Referral Source admission list    Reason for Consult discharge planning    Preferred Language English       Contact Information    Permission Granted to Share Info With                    Functional Status       Row Name 25 1731       Functional Status    Usual Activity Tolerance fair    Current Activity Tolerance poor       Physical Activity    On average, how many days per week do you engage in moderate to strenuous exercise (like a brisk walk)? Pt Unable    On " "average, how many minutes do you engage in exercise at this level? Pt Unable       Assessment of Health Literacy    How often do you have someone help you read hospital materials? Often  Per son/Ang    How often do you have problems learning about your medical condition because of difficulty understanding written information? Often    How often do you have a problem understanding what is told to you about your medical condition? Often    How confident are you filling out medical forms by yourself? Somewhat    Health Literacy Fair       Functional Status, IADL    Medications assistive person    Meal Preparation assistive person    Housekeeping assistive person    Laundry assistive person    Shopping assistive person    IADL Comments Per Son/Ang \"needs more Assistance with IADL's.\"       Mental Status    General Appearance WDL WDL       Mental Status Summary    Recent Changes in Mental Status/Cognitive Functioning unable to assess    Mental Status Comments Pt confused and unable to answer any questions at this time.       Employment/    Employment Status retired                   Psychosocial       Row Name 02/22/25 0264       Developmental Stage (Eriksson's Stages of Development)    Developmental Stage Stage 8 (65 years-death/Late Adulthood) Integrity vs. Despair                   Abuse/Neglect    No documentation.                  Legal    No documentation.                  Substance Abuse    No documentation.                  Patient Forms    No documentation.                     Pamela Mariano RN    "

## 2025-02-22 NOTE — PROGRESS NOTES
[facility]  INTERNAL MEDICINE PROGRESS NOTE    Name:  Tracey Nelson   Age:  84 y.o.  Sex:  female  :  1940  MRN:  [id number]   Visit Number:  [id number]  Admission Date:  2025  Date Of Service:  25  Primary Care Physician:  Leola Wright MD     LOS: 0 days :  Patient Care Team:  Leola Wright MD as PCP - General (Internal Medicine):      Subjective / Interval History:     84-year-old patient has a history of hypertension, hyperlipidemia, COPD, who comes in because of because of left lower lobe pneumonia with more confusion and encephalopathy.    Nurse states that last night she was agitated did not sleep but today she is very sleepy difficult to be aroused.  She has not got any sedating use and is just in deep sleep.  History could not be obtained from her      Vital Signs:    Temp:  [97.4 °F (36.3 °C)-98.1 °F (36.7 °C)] 98 °F (36.7 °C)  Heart Rate:  [79-99] 79  Resp:  [16-20] 18  BP: (158-179)/(65-88) 168/67    Intake and output:    I/O last 3 completed shifts:  In: 750 [IV Piggyback:750]  Out: -   No intake/output data recorded.    Physical Examination:    General Appearance:  Lethargic and cooperative, not in any acute distress.   Head:    Atraumatic and normocephalic, without obvious abnormality.   Eyes:            PERRLA,  No pallor. Extraocular movements are within normal limits.   Neck:   Supple,  No lymph glands, no bruit.   Lungs:     Chest shape is normal. Breath sounds heard bilaterally equally.  No crackles or wheezing.     Heart:    Normal S1 and S2, no murmur, no JVD.   Abdomen:     Normal bowel sounds, no masses, no organomegaly. Soft     nontender, no guarding, no rebound tenderness.   Extremities:   Moves all extremities well, no edema, no cyanosis,    Skin:   No  bruising or rash.   Neurologic:   Grossly nonfocal and moves all extremities.  Has been very sleepy     Laboratory results:  Results from last 7 days   Lab Units 25  0947  02/21/25  1352   SODIUM mmol/L 133* 133*   POTASSIUM mmol/L 3.3* 3.6   CHLORIDE mmol/L 100 96*   CO2 mmol/L 21.2* 24.6   BUN mg/dL 9 18   CREATININE mg/dL 0.45* 0.50*   CALCIUM mg/dL 9.8 11.4*   BILIRUBIN mg/dL 0.3 0.3   ALK PHOS U/L 106 134*   ALT (SGPT) U/L 15 22   AST (SGOT) U/L 15 21   GLUCOSE mg/dL 112* 182*     Results from last 7 days   Lab Units 02/22/25  0947 02/21/25  1352   WBC 10*3/mm3 11.16* 12.49*   HEMOGLOBIN g/dL 11.3* 13.0   HEMATOCRIT % 36.9 41.7   PLATELETS 10*3/mm3 333 442         Results from last 7 days   Lab Units 02/22/25  0947 02/21/25  1504 02/21/25  1352   HSTROP T ng/L 26* 51* 32*     Results from last 7 days   Lab Units 02/21/25  1345   URINECX  >100,000 CFU/mL Gram Positive Cocci*       Radiology results:    Imaging Results (Last 24 Hours)       Procedure Component Value Units Date/Time    CT Head Without Contrast [[id number]] Collected: 02/21/25 1626     Updated: 02/21/25 1639    Narrative:      PROCEDURE: CT HEAD WO CONTRAST-     HISTORY: Transient confusion     COMPARISON: 2/19/2024.     TECHNIQUE: Multiple axial CT images were performed from the foramen  magnum to the vertex. Individualized dose reduction techniques using  automated exposure control or adjustment of mA and/or kV according to  the patient size were employed.      FINDINGS: There is moderate generalized cerebral atrophy. The ventricles  are enlarged. There are moderate white matter changes consistent with  chronic microvascular ischemic disease. There is no evidence of edema or  hemorrhage.  No masses are identified. No extra-axial fluid is seen. The  paranasal sinuses are unremarkable.       Impression:      Atrophy and chronic change without acute process.                       Images were reviewed, interpreted, and dictated by Dr. Ahsan Hui MD  Transcribed by Dallas Cervantes PA-C.     This report was signed and finalized on 2/21/2025 4:37 PM by Ahsan Hui MD.       CT Chest Without Contrast Diagnostic  [[id number]] Collected: 02/21/25 1627     Updated: 02/21/25 1639    Narrative:      PROCEDURE: CT CHEST WO CONTRAST DIAGNOSTIC-     HISTORY: LLQ infiltrate     COMPARISON: Previous day.     PROCEDURE:  Multiple axial CT images were obtained from the thoracic  inlet through the upper abdomen without the use of contrast.      FINDINGS: There is left hilar enlargement.. Heart size is normal. The  aorta is normal in caliber. Interval worsening of left lower lobe  pneumonia. There is opacification of the left lower lobe bronchus,  endobronchial lesion is not excluded. There is a small left pleural  effusion. No pericardial or right pleural effusion. There are changes of  emphysema. The visualized upper abdomen is unremarkable. Bone windows  reveal no acute osseous abnormalities.       Impression:      Extensive left lower lobe pneumonia with possible  postobstructive change, consider follow-up bronchoscopy.     Small left pleural effusion.     Left hilar adenopathy suspected.        This study was performed with techniques to keep radiation doses as low  as reasonably achievable (ALARA). Individualized dose reduction  techniques using automated exposure control or adjustment of mA and/or  kV according to the patient size were employed.            Images were reviewed, interpreted, and dictated by Dr. Ahsan Hui MD  Transcribed by Dallas Cervantes PA-C.     This report was signed and finalized on 2/21/2025 4:37 PM by Ahsan Hui MD.       XR Chest 1 View [[id number]] Collected: 02/21/25 1500     Updated: 02/21/25 1503    Narrative:      PROCEDURE: XR CHEST 1 VW-     HISTORY: AMS     COMPARISON: 2/20/2024.     FINDINGS: The heart is normal in size. The mediastinum is unremarkable.  There is a stable left lower lobe opacity compatible with pleural  effusion and underlying pneumonia. The right lung is clear. There is no  pneumothorax.  There are no acute osseous abnormalities.       Impression:      No significant interval  change.              Images were reviewed, interpreted, and dictated by Dr. Ahsan Hui MD  Transcribed by Dallas Cervantes PA-C.     This report was signed and finalized on 2/21/2025 3:01 PM by Ahsan Hui MD.               I have reviewed the patient's radiology reports.    Medication Review:     I have reviewed the patient's active and prn medications.     Assessment:      Pneumonia  Metabolic encephalopathy  Hypertension  Anxiety disorder  UTI    Plan:    1.  Left-sided pneumonia-she is having pneumonia on the scan but saturation on room air is stable continue with Rocephin and Zithromax at present    2.  UTI-UA showing more than 100,000 gram-positive cocci.  She has been started on Rocephin and will continue with IV hydration    3.  Metabolic encephalopathy-this should be multifactorial but could be also having underlying dementia which has not been diagnosed and there is more flareup because of the infection  Continue with the current management and see if she improves    Goals of treatment has been addressed with the nursing staff at present and see rest as per orders    Luis Mustafa MD  02/22/25  14:52 EST      Please note that portions of this note were completed with a voice recognition program.

## 2025-02-23 LAB
ALBUMIN SERPL-MCNC: 2.4 G/DL (ref 3.5–5.2)
ALBUMIN/GLOB SERPL: 0.6 G/DL
ALP SERPL-CCNC: 100 U/L (ref 39–117)
ALT SERPL W P-5'-P-CCNC: 12 U/L (ref 1–33)
ANION GAP SERPL CALCULATED.3IONS-SCNC: 11.5 MMOL/L (ref 5–15)
AST SERPL-CCNC: 16 U/L (ref 1–32)
BILIRUB SERPL-MCNC: 0.3 MG/DL (ref 0–1.2)
BUN SERPL-MCNC: 11 MG/DL (ref 8–23)
BUN/CREAT SERPL: 25.6 (ref 7–25)
CALCIUM SPEC-SCNC: 9.9 MG/DL (ref 8.6–10.5)
CHLORIDE SERPL-SCNC: 102 MMOL/L (ref 98–107)
CO2 SERPL-SCNC: 19.5 MMOL/L (ref 22–29)
CREAT SERPL-MCNC: 0.43 MG/DL (ref 0.57–1)
DEPRECATED RDW RBC AUTO: 56.4 FL (ref 37–54)
EGFRCR SERPLBLD CKD-EPI 2021: 95.5 ML/MIN/1.73
ERYTHROCYTE [DISTWIDTH] IN BLOOD BY AUTOMATED COUNT: 17.5 % (ref 12.3–15.4)
GLOBULIN UR ELPH-MCNC: 3.8 GM/DL
GLUCOSE SERPL-MCNC: 81 MG/DL (ref 65–99)
HCT VFR BLD AUTO: 33.6 % (ref 34–46.6)
HGB BLD-MCNC: 10.7 G/DL (ref 12–15.9)
MCH RBC QN AUTO: 27.6 PG (ref 26.6–33)
MCHC RBC AUTO-ENTMCNC: 31.8 G/DL (ref 31.5–35.7)
MCV RBC AUTO: 86.8 FL (ref 79–97)
PLATELET # BLD AUTO: 345 10*3/MM3 (ref 140–450)
PMV BLD AUTO: 9.9 FL (ref 6–12)
POTASSIUM SERPL-SCNC: 3.5 MMOL/L (ref 3.5–5.2)
PROT SERPL-MCNC: 6.2 G/DL (ref 6–8.5)
RBC # BLD AUTO: 3.87 10*6/MM3 (ref 3.77–5.28)
SODIUM SERPL-SCNC: 133 MMOL/L (ref 136–145)
WBC NRBC COR # BLD AUTO: 13.4 10*3/MM3 (ref 3.4–10.8)

## 2025-02-23 PROCEDURE — 25010000002 CEFTRIAXONE PER 250 MG: Performed by: STUDENT IN AN ORGANIZED HEALTH CARE EDUCATION/TRAINING PROGRAM

## 2025-02-23 PROCEDURE — 25010000002 ENOXAPARIN PER 10 MG: Performed by: STUDENT IN AN ORGANIZED HEALTH CARE EDUCATION/TRAINING PROGRAM

## 2025-02-23 PROCEDURE — 25810000003 SODIUM CHLORIDE 0.9 % SOLUTION 250 ML FLEX CONT: Performed by: STUDENT IN AN ORGANIZED HEALTH CARE EDUCATION/TRAINING PROGRAM

## 2025-02-23 PROCEDURE — 85027 COMPLETE CBC AUTOMATED: CPT | Performed by: INTERNAL MEDICINE

## 2025-02-23 PROCEDURE — 80053 COMPREHEN METABOLIC PANEL: CPT | Performed by: INTERNAL MEDICINE

## 2025-02-23 PROCEDURE — 25010000002 AZITHROMYCIN PER 500 MG: Performed by: STUDENT IN AN ORGANIZED HEALTH CARE EDUCATION/TRAINING PROGRAM

## 2025-02-23 PROCEDURE — 99232 SBSQ HOSP IP/OBS MODERATE 35: CPT | Performed by: FAMILY MEDICINE

## 2025-02-23 PROCEDURE — G0378 HOSPITAL OBSERVATION PER HR: HCPCS

## 2025-02-23 RX ORDER — ACETAMINOPHEN 325 MG/1
650 TABLET ORAL EVERY 6 HOURS PRN
Status: DISCONTINUED | OUTPATIENT
Start: 2025-02-23 | End: 2025-02-25 | Stop reason: HOSPADM

## 2025-02-23 RX ORDER — ONDANSETRON 4 MG/1
4 TABLET, ORALLY DISINTEGRATING ORAL EVERY 6 HOURS PRN
Status: DISCONTINUED | OUTPATIENT
Start: 2025-02-23 | End: 2025-02-25 | Stop reason: HOSPADM

## 2025-02-23 RX ORDER — ONDANSETRON 2 MG/ML
4 INJECTION INTRAMUSCULAR; INTRAVENOUS EVERY 6 HOURS PRN
Status: DISCONTINUED | OUTPATIENT
Start: 2025-02-23 | End: 2025-02-25 | Stop reason: HOSPADM

## 2025-02-23 RX ADMIN — CEFTRIAXONE 1000 MG: 1 INJECTION, POWDER, FOR SOLUTION INTRAMUSCULAR; INTRAVENOUS at 09:12

## 2025-02-23 RX ADMIN — Medication 10 ML: at 08:39

## 2025-02-23 RX ADMIN — ASPIRIN 81 MG CHEWABLE TABLET 81 MG: 81 TABLET CHEWABLE at 08:39

## 2025-02-23 RX ADMIN — CILOSTAZOL 50 MG: 100 TABLET ORAL at 21:30

## 2025-02-23 RX ADMIN — ATORVASTATIN CALCIUM 20 MG: 40 TABLET, FILM COATED ORAL at 08:39

## 2025-02-23 RX ADMIN — Medication 10 ML: at 21:32

## 2025-02-23 RX ADMIN — Medication 1 PACKET: at 16:16

## 2025-02-23 RX ADMIN — BUSPIRONE HYDROCHLORIDE 10 MG: 10 TABLET ORAL at 21:30

## 2025-02-23 RX ADMIN — LOSARTAN POTASSIUM 50 MG: 50 TABLET, FILM COATED ORAL at 08:39

## 2025-02-23 RX ADMIN — CILOSTAZOL 50 MG: 100 TABLET ORAL at 08:38

## 2025-02-23 RX ADMIN — AMLODIPINE BESYLATE 2.5 MG: 5 TABLET ORAL at 08:38

## 2025-02-23 RX ADMIN — Medication 1 PACKET: at 08:38

## 2025-02-23 RX ADMIN — BUSPIRONE HYDROCHLORIDE 10 MG: 10 TABLET ORAL at 08:39

## 2025-02-23 RX ADMIN — AZITHROMYCIN DIHYDRATE 500 MG: 500 INJECTION, POWDER, LYOPHILIZED, FOR SOLUTION INTRAVENOUS at 13:09

## 2025-02-23 NOTE — THERAPY EVALUATION
Attempted PT evaluation twice this date. Pt reports that she is too fatigued to participate and does not feel well. Pt educated on frequent mobility, expressing understanding. PT to follow up tomorrow as appropriate.

## 2025-02-23 NOTE — PLAN OF CARE
Problem: Adult Inpatient Plan of Care  Goal: Plan of Care Review  Outcome: Progressing  Flowsheets (Taken 2/22/2025 2212)  Progress: no change  Plan of Care Reviewed With: patient  Goal: Patient-Specific Goal (Individualized)  Outcome: Progressing  Goal: Absence of Hospital-Acquired Illness or Injury  Outcome: Progressing  Intervention: Identify and Manage Fall Risk  Recent Flowsheet Documentation  Taken 2/22/2025 2200 by Shar Vidal RN  Safety Promotion/Fall Prevention:   activity supervised   assistive device/personal items within reach   fall prevention program maintained   nonskid shoes/slippers when out of bed   safety round/check completed  Taken 2/22/2025 2100 by Shar Vidal RN  Safety Promotion/Fall Prevention:   activity supervised   assistive device/personal items within reach   fall prevention program maintained   nonskid shoes/slippers when out of bed   safety round/check completed  Intervention: Prevent Skin Injury  Recent Flowsheet Documentation  Taken 2/22/2025 2200 by Shar Vidal RN  Body Position: patient/family refused  Taken 2/22/2025 2100 by Shar Vidal RN  Body Position:   turned   supine  Intervention: Prevent Infection  Recent Flowsheet Documentation  Taken 2/22/2025 2200 by Shar Vidal RN  Infection Prevention:   environmental surveillance performed   equipment surfaces disinfected   single patient room provided   hand hygiene promoted   rest/sleep promoted  Taken 2/22/2025 2100 by Shar Vidal RN  Infection Prevention:   environmental surveillance performed   equipment surfaces disinfected   single patient room provided   hand hygiene promoted   rest/sleep promoted  Goal: Optimal Comfort and Wellbeing  Outcome: Progressing  Goal: Readiness for Transition of Care  Outcome: Progressing     Problem: Skin Injury Risk Increased  Goal: Skin Health and Integrity  Outcome: Progressing  Intervention: Optimize Skin Protection  Recent Flowsheet Documentation  Taken  2/22/2025 2200 by Shar Vidal, RN  Activity Management: activity encouraged  Head of Bed (HOB) Positioning: HOB at 20 degrees  Taken 2/22/2025 2100 by Shar Vidal, RN  Activity Management: activity encouraged  Head of Bed (HOB) Positioning: HOB at 20 degrees     Problem: Fall Injury Risk  Goal: Absence of Fall and Fall-Related Injury  Outcome: Progressing  Intervention: Promote Injury-Free Environment  Recent Flowsheet Documentation  Taken 2/22/2025 2200 by Shar Vidal, RN  Safety Promotion/Fall Prevention:   activity supervised   assistive device/personal items within reach   fall prevention program maintained   nonskid shoes/slippers when out of bed   safety round/check completed  Taken 2/22/2025 2100 by Shar Vidal, RN  Safety Promotion/Fall Prevention:   activity supervised   assistive device/personal items within reach   fall prevention program maintained   nonskid shoes/slippers when out of bed   safety round/check completed   Goal Outcome Evaluation:  Plan of Care Reviewed With: patient        Progress: no change

## 2025-02-23 NOTE — PROGRESS NOTES
[facility]  INTERNAL MEDICINE PROGRESS NOTE    Name:  Tracey Nelson   Age:  84 y.o.  Sex:  female  :  1940  MRN:  [id number]   Visit Number:  [id number]  Admission Date:  2025  Date Of Service:  25  Primary Care Physician:  Leola Wright MD     LOS: 0 days :  Patient Care Team:  Leola Wright MD as PCP - General (Internal Medicine):      Subjective / Interval History:     Patient doing better today.  She is awake alert and oriented.  She denies any significant complaints.  Her son at bedside states that they were worried primarily about a UTI, as her urine was significantly dark and malodorous at home that she had been confused.  This has all improved now.  She denies any shortness of breath or cough.    Patient did tell me that she did follow-up with a lung doctor a year ago, had refused bronchoscopy at that time and still does now.      Vital Signs:    Temp:  [97.6 °F (36.4 °C)-98.2 °F (36.8 °C)] 98.1 °F (36.7 °C)  Heart Rate:  [] 120  Resp:  [16-18] 18  BP: (154-166)/(58-82) 166/72    Intake and output:    I/O last 3 completed shifts:  In: 250 [IV Piggyback:250]  Out: -   I/O this shift:  In: 120 [P.O.:120]  Out: -     Physical Examination:    General Appearance:  Awake and alert and following all commands   Head:    Atraumatic and normocephalic, without obvious abnormality.   Eyes:            PERRLA,  No pallor. Extraocular movements are within normal limits.   Neck:   Supple,  No lymph glands, no bruit.   Lungs:     Chest shape is normal. Breath sounds heard bilaterally equally.  No crackles or wheezing.     Heart:    Normal S1 and S2, no murmur, no JVD.   Abdomen:     Normal bowel sounds, no masses, no organomegaly. Soft     nontender, no guarding, no rebound tenderness.   Extremities:   Moves all extremities well, no edema, no cyanosis,    Skin:   No  bruising or rash.   Neurologic: Alert and oriented x 3.     Laboratory results:  Results from last  7 days   Lab Units 02/23/25  0706 02/22/25  0947 02/21/25  1352   SODIUM mmol/L 133* 133* 133*   POTASSIUM mmol/L 3.5 3.3* 3.6   CHLORIDE mmol/L 102 100 96*   CO2 mmol/L 19.5* 21.2* 24.6   BUN mg/dL 11 9 18   CREATININE mg/dL 0.43* 0.45* 0.50*   CALCIUM mg/dL 9.9 9.8 11.4*   BILIRUBIN mg/dL 0.3 0.3 0.3   ALK PHOS U/L 100 106 134*   ALT (SGPT) U/L 12 15 22   AST (SGOT) U/L 16 15 21   GLUCOSE mg/dL 81 112* 182*     Results from last 7 days   Lab Units 02/23/25  0706 02/22/25  0947 02/21/25  1352   WBC 10*3/mm3 13.40* 11.16* 12.49*   HEMOGLOBIN g/dL 10.7* 11.3* 13.0   HEMATOCRIT % 33.6* 36.9 41.7   PLATELETS 10*3/mm3 345 333 442         Results from last 7 days   Lab Units 02/22/25  0947 02/21/25  1504 02/21/25  1352   HSTROP T ng/L 26* 51* 32*     Results from last 7 days   Lab Units 02/21/25  1539 02/21/25  1523 02/21/25  1345   BLOODCX  No growth at 24 hours No growth at 24 hours  --    URINECX   --   --  >100,000 CFU/mL Gram Positive Cocci*       Radiology results:    Imaging Results (Last 24 Hours)       ** No results found for the last 24 hours. **            I have reviewed the patient's radiology reports.    Medication Review:     I have reviewed the patient's active and prn medications.     Assessment:      Pneumonia  Metabolic encephalopathy  Hypertension  Anxiety disorder  UTI    Plan:    1.  Left-sided pneumonia?:  Patient largely asymptomatic.  On baseline room air.  Notes previously recommended bronchoscopy about a year ago in [place] for which she refused.  She does not wish to undergo any invasive procedures for any further workup.    2.  UTI-patient was symptomatic, suspect this was likely what led to her confusion and presentation.  Urine culture pending.  Currently on Rocephin.    3.  Metabolic encephalopathy-appears to be back to baseline now after discussion with her son at bedside.  Likely due to the UTI.    Hopefully home in 1 to 2 days, possibly tomorrow    Judith Rhodes, DO  02/23/25  13:58  EST      Please note that portions of this note were completed with a voice recognition program.

## 2025-02-23 NOTE — PLAN OF CARE
Goal Outcome Evaluation:   No acute events this shift. Continued IV antibiotics & other home meds, no IVF at this time. Encouraging PO intake with Julio César. Pt was not up for PT today, was encouraged to participate tomorrow. Repeat chest Xray in the AM, likely home is medically ready.

## 2025-02-24 ENCOUNTER — APPOINTMENT (OUTPATIENT)
Dept: GENERAL RADIOLOGY | Facility: HOSPITAL | Age: 85
End: 2025-02-24
Payer: MEDICARE

## 2025-02-24 LAB
ALBUMIN SERPL-MCNC: 2.5 G/DL (ref 3.5–5.2)
ALBUMIN/GLOB SERPL: 0.7 G/DL
ALP SERPL-CCNC: 105 U/L (ref 39–117)
ALT SERPL W P-5'-P-CCNC: 13 U/L (ref 1–33)
ANION GAP SERPL CALCULATED.3IONS-SCNC: 11.1 MMOL/L (ref 5–15)
AST SERPL-CCNC: 14 U/L (ref 1–32)
BACTERIA SPEC AEROBE CULT: NORMAL
BILIRUB SERPL-MCNC: 0.3 MG/DL (ref 0–1.2)
BUN SERPL-MCNC: 15 MG/DL (ref 8–23)
BUN/CREAT SERPL: 34.9 (ref 7–25)
CALCIUM SPEC-SCNC: 9.9 MG/DL (ref 8.6–10.5)
CHLORIDE SERPL-SCNC: 102 MMOL/L (ref 98–107)
CO2 SERPL-SCNC: 21.9 MMOL/L (ref 22–29)
CREAT SERPL-MCNC: 0.43 MG/DL (ref 0.57–1)
DEPRECATED RDW RBC AUTO: 54.9 FL (ref 37–54)
EGFRCR SERPLBLD CKD-EPI 2021: 95.5 ML/MIN/1.73
ERYTHROCYTE [DISTWIDTH] IN BLOOD BY AUTOMATED COUNT: 17.3 % (ref 12.3–15.4)
GLOBULIN UR ELPH-MCNC: 3.7 GM/DL
GLUCOSE SERPL-MCNC: 131 MG/DL (ref 65–99)
HCT VFR BLD AUTO: 31.4 % (ref 34–46.6)
HGB BLD-MCNC: 9.9 G/DL (ref 12–15.9)
MCH RBC QN AUTO: 27.2 PG (ref 26.6–33)
MCHC RBC AUTO-ENTMCNC: 31.5 G/DL (ref 31.5–35.7)
MCV RBC AUTO: 86.3 FL (ref 79–97)
PLATELET # BLD AUTO: 412 10*3/MM3 (ref 140–450)
PMV BLD AUTO: 9.6 FL (ref 6–12)
POTASSIUM SERPL-SCNC: 3.1 MMOL/L (ref 3.5–5.2)
PROT SERPL-MCNC: 6.2 G/DL (ref 6–8.5)
RBC # BLD AUTO: 3.64 10*6/MM3 (ref 3.77–5.28)
SODIUM SERPL-SCNC: 135 MMOL/L (ref 136–145)
WBC NRBC COR # BLD AUTO: 11.44 10*3/MM3 (ref 3.4–10.8)

## 2025-02-24 PROCEDURE — 71045 X-RAY EXAM CHEST 1 VIEW: CPT

## 2025-02-24 PROCEDURE — 25010000002 FUROSEMIDE PER 20 MG: Performed by: FAMILY MEDICINE

## 2025-02-24 PROCEDURE — 25010000002 CEFTRIAXONE PER 250 MG: Performed by: STUDENT IN AN ORGANIZED HEALTH CARE EDUCATION/TRAINING PROGRAM

## 2025-02-24 PROCEDURE — 97161 PT EVAL LOW COMPLEX 20 MIN: CPT

## 2025-02-24 PROCEDURE — 80053 COMPREHEN METABOLIC PANEL: CPT | Performed by: INTERNAL MEDICINE

## 2025-02-24 PROCEDURE — 85027 COMPLETE CBC AUTOMATED: CPT | Performed by: INTERNAL MEDICINE

## 2025-02-24 PROCEDURE — 99231 SBSQ HOSP IP/OBS SF/LOW 25: CPT | Performed by: FAMILY MEDICINE

## 2025-02-24 RX ORDER — POTASSIUM CHLORIDE 750 MG/1
40 CAPSULE, EXTENDED RELEASE ORAL EVERY 4 HOURS
Status: COMPLETED | OUTPATIENT
Start: 2025-02-24 | End: 2025-02-24

## 2025-02-24 RX ORDER — AMLODIPINE BESYLATE 5 MG/1
5 TABLET ORAL
Status: DISCONTINUED | OUTPATIENT
Start: 2025-02-25 | End: 2025-02-25 | Stop reason: HOSPADM

## 2025-02-24 RX ORDER — ARGININE/GLUTAMINE/CALCIUM BMB 7G-7G-1.5G
1 POWDER IN PACKET (EA) ORAL 2 TIMES DAILY
Status: DISCONTINUED | OUTPATIENT
Start: 2025-02-24 | End: 2025-02-25 | Stop reason: HOSPADM

## 2025-02-24 RX ORDER — FUROSEMIDE 10 MG/ML
20 INJECTION INTRAMUSCULAR; INTRAVENOUS ONCE
Status: COMPLETED | OUTPATIENT
Start: 2025-02-24 | End: 2025-02-24

## 2025-02-24 RX ADMIN — BUSPIRONE HYDROCHLORIDE 10 MG: 10 TABLET ORAL at 20:25

## 2025-02-24 RX ADMIN — LOSARTAN POTASSIUM 50 MG: 50 TABLET, FILM COATED ORAL at 08:37

## 2025-02-24 RX ADMIN — CILOSTAZOL 50 MG: 100 TABLET ORAL at 08:28

## 2025-02-24 RX ADMIN — Medication 1 PACKET: at 08:32

## 2025-02-24 RX ADMIN — ATORVASTATIN CALCIUM 20 MG: 40 TABLET, FILM COATED ORAL at 08:29

## 2025-02-24 RX ADMIN — Medication 10 ML: at 08:29

## 2025-02-24 RX ADMIN — Medication 1 PACKET: at 17:11

## 2025-02-24 RX ADMIN — FUROSEMIDE 20 MG: 10 INJECTION, SOLUTION INTRAMUSCULAR; INTRAVENOUS at 08:28

## 2025-02-24 RX ADMIN — POTASSIUM CHLORIDE 40 MEQ: 750 CAPSULE, EXTENDED RELEASE ORAL at 13:26

## 2025-02-24 RX ADMIN — AMLODIPINE BESYLATE 2.5 MG: 5 TABLET ORAL at 08:29

## 2025-02-24 RX ADMIN — CEFTRIAXONE 1000 MG: 1 INJECTION, POWDER, FOR SOLUTION INTRAMUSCULAR; INTRAVENOUS at 09:55

## 2025-02-24 RX ADMIN — CILOSTAZOL 50 MG: 100 TABLET ORAL at 20:24

## 2025-02-24 RX ADMIN — BUSPIRONE HYDROCHLORIDE 10 MG: 10 TABLET ORAL at 08:29

## 2025-02-24 RX ADMIN — POTASSIUM CHLORIDE 40 MEQ: 750 CAPSULE, EXTENDED RELEASE ORAL at 17:11

## 2025-02-24 RX ADMIN — ASPIRIN 81 MG CHEWABLE TABLET 81 MG: 81 TABLET CHEWABLE at 08:29

## 2025-02-24 NOTE — PROGRESS NOTES
[facility]  INTERNAL MEDICINE PROGRESS NOTE    Name:  Tracey Nelson   Age:  84 y.o.  Sex:  female  :  1940  MRN:  [id number]   Visit Number:  [id number]  Admission Date:  2025  Date Of Service:  25  Primary Care Physician:  Leola Wright MD     LOS: 0 days :  Patient Care Team:  Leola Wright MD as PCP - General (Internal Medicine):      Subjective / Interval History:     Patient feeling better.  Eating and drinking well.  She is at baseline mental status.  No further confusion.  No fevers.  Urinating well.  I discussed x-ray findings.  Patient notes that she does not wish to undergo any invasive procedures including bronchoscopy at this time.  Discussed would like to have her participate with therapy prior to discharge.      Vital Signs:    Temp:  [97.2 °F (36.2 °C)-97.9 °F (36.6 °C)] 97.8 °F (36.6 °C)  Heart Rate:  [] 87  Resp:  [16-18] 16  BP: (135-176)/(44-78) 151/78    Intake and output:    I/O last 3 completed shifts:  In: 720 [P.O.:720]  Out: -   No intake/output data recorded.    Physical Examination:    General Appearance:  Awake and alert and following all commands   Head:    Atraumatic and normocephalic, without obvious abnormality.   Eyes:            PERRLA,  No pallor. Extraocular movements are within normal limits.   Neck:   Supple,  No lymph glands, no bruit.   Lungs:     Chest shape is normal. Breath sounds heard bilaterally equally.  No crackles or wheezing.     Heart:    Normal S1 and S2, no murmur, no JVD.   Abdomen:     Normal bowel sounds, no masses, no organomegaly. Soft     nontender, no guarding, no rebound tenderness.   Extremities:   Moves all extremities well, no edema, no cyanosis,    Skin:   No  bruising or rash.   Neurologic: Alert and oriented x 3.  Moves all extremities     Laboratory results:  Results from last 7 days   Lab Units 25  0718 25  0706 25  0947   SODIUM mmol/L 135* 133* 133*   POTASSIUM mmol/L  3.1* 3.5 3.3*   CHLORIDE mmol/L 102 102 100   CO2 mmol/L 21.9* 19.5* 21.2*   BUN mg/dL 15 11 9   CREATININE mg/dL 0.43* 0.43* 0.45*   CALCIUM mg/dL 9.9 9.9 9.8   BILIRUBIN mg/dL 0.3 0.3 0.3   ALK PHOS U/L 105 100 106   ALT (SGPT) U/L 13 12 15   AST (SGOT) U/L 14 16 15   GLUCOSE mg/dL 131* 81 112*     Results from last 7 days   Lab Units 02/24/25  0718 02/23/25  0706 02/22/25  0947   WBC 10*3/mm3 11.44* 13.40* 11.16*   HEMOGLOBIN g/dL 9.9* 10.7* 11.3*   HEMATOCRIT % 31.4* 33.6* 36.9   PLATELETS 10*3/mm3 412 345 333         Results from last 7 days   Lab Units 02/22/25  0947 02/21/25  1504 02/21/25  1352   HSTROP T ng/L 26* 51* 32*     Results from last 7 days   Lab Units 02/21/25  1539 02/21/25  1523 02/21/25  1345   BLOODCX  No growth at 2 days No growth at 2 days  --    URINECX   --   --  >100,000 CFU/mL Mixed Marina Isolated       Radiology results:    Imaging Results (Last 24 Hours)       Procedure Component Value Units Date/Time    XR Chest 1 View [[id number]] Collected: 02/24/25 0713     Updated: 02/24/25 0716    Narrative:      PROCEDURE: XR CHEST 1 VW-     HISTORY: f/u cough; J18.9-Pneumonia, unspecified organism; A41.9-Sepsis,  unspecified organism; R41.0-Disorientation, unspecified     COMPARISON: February 21, 2025..     FINDINGS: The heart is likely stable but difficult to evaluate due to  opacification of the inferior half of the left hemithorax, similar to  prior exam. This is likely secondary to combination of pleural effusion  and left basilar airspace disease/atelectasis. Effusion appears  minimally increased compared to the prior.. Bilateral interstitial  disease is stable. Patient is rotated to the left. Aortic mural  calcifications noted.. The mediastinum is unremarkable. There is no  pneumothorax.  There are no acute osseous abnormalities. Apical lordotic  positioning noted.        Impression:      Minimally increased left pleural effusion, otherwise stable  chest..              This report was  signed and finalized on 2/24/2025 7:14 AM by Shelly Cortez MD.               I have reviewed the patient's radiology reports.    Medication Review:     I have reviewed the patient's active and prn medications.     Assessment:      Pneumonia  Metabolic encephalopathy  Hypertension  Anxiety disorder  UTI    Plan:    1.  Left-sided pneumonia?:  Patient largely asymptomatic.  On baseline room air.  Notes previously recommended bronchoscopy about a year ago in [place] for which she refused.  She does not wish to undergo any invasive procedures for any further workup.  X-rays stable.  Will finish course of antibiotic    2.  UTI-patient was symptomatic with dysuria.  Urine culture with mixed aminah.  Will complete course with Rocephin.    3.  Metabolic encephalopathy-appears to be back to baseline now after discussion with her son at bedside.  Likely due to the UTI.    Will plan on discharge home tomorrow if stable and works with therapy    Judith Rhodes,   02/24/25  14:08 EST      Please note that portions of this note were completed with a voice recognition program.

## 2025-02-24 NOTE — CONSULTS
"Dietitian Assessment    Patient Name: Tracey Nelson  YOB: 1940  MRN: [id number]  Admission date: 2/21/2025    Comment:      Clinical Nutrition Assessment      Reason for Assessment Identified at risk by screening criteria, MST score 2+, BMI   H&P  Past Medical History:   Diagnosis Date    Allergies     Bronchitis     Cataract, bilateral     s/p surgery    Coronary artery disease     Hyperlipidemia     LBBB (left bundle branch block) 2018    Myocardial infarction     pt denies    PAD (peripheral artery disease)     stent in each leg    Pneumonia     Skin cancer     Wears dentures     upper plate and lower is a partial       Past Surgical History:   Procedure Laterality Date    CAROTID ENDARTERECTOMY Left     CATARACT EXTRACTION W/ INTRAOCULAR LENS  IMPLANT, BILATERAL      COLONOSCOPY      DILATION AND CURETTAGE, DIAGNOSTIC / THERAPEUTIC      HIP BIPOLAR REPLACEMENT Left 2/20/2024    Procedure: HIP BIPOLAR REPLACEMENT LEFT;  Surgeon: Cedrick Gomez MD;  Location: [facility];  Service: Orthopedics;  Laterality: Left;    OTHER SURGICAL HISTORY Bilateral     stent in each leg    SKIN BIOPSY      SKIN CANCER EXCISION      TONSILLECTOMY              Current Problems   Patient Active Problem List   Diagnosis    Abnormal CT of the chest    Chronic cough    Closed left hip fracture    Peripheral artery disease    Severe malnutrition    Pneumonia        Encounter Information        Trending Narrative     2/24: Pt w/ reported 15lb weight loss w/in 3 months and poor PO intake. PO intake averaging 45% x 5 meals since admission. Pt w/ severely underweight BMI. Will order supplementation.      Anthropometrics        Current Height, Weight Height: 165.1 cm (65\")  Weight: 35.7 kg (78 lb 11.3 oz) (02/21/25 2010)   Trending Weight Hx     This admission:              PTA:     Wt Readings from Last 30 Encounters:   02/21/25 2010 35.7 kg (78 lb 11.3 oz)   02/21/25 1312 38.1 kg (84 lb)   04/23/24 1313 43.1 kg (95 lb " "2 oz)   02/24/24 0507 46 kg (101 lb 6.6 oz)   02/23/24 0314 46 kg (101 lb 6.6 oz)   02/22/24 0400 45.5 kg (100 lb 5 oz)   02/21/24 0400 44.9 kg (98 lb 15.8 oz)   02/20/24 0944 60.2 kg (132 lb 11.5 oz)   02/20/24 0316 60.2 kg (132 lb 11.5 oz)   02/19/24 1734 60.5 kg (133 lb 6.1 oz)   02/19/24 1337 42.6 kg (94 lb)   12/24/23 2153 47.2 kg (104 lb)   08/30/23 1021 41.5 kg (91 lb 9.6 oz)   11/27/22 1129 42.2 kg (93 lb)   10/16/22 0225 43.1 kg (95 lb)   09/02/22 1308 41.3 kg (91 lb)   06/30/22 1321 41.7 kg (92 lb)   09/01/21 1533 42.2 kg (93 lb)   03/27/17 1329 47.6 kg (105 lb)   01/14/14 1429 48.3 kg (106 lb 8.1 oz)      BMI kg/m2 Body mass index is 13.1 kg/m².     Labs        Pertinent Labs     Results from last 7 days   Lab Units 02/24/25  0718 02/23/25  0706 02/22/25  0947   SODIUM mmol/L 135* 133* 133*   POTASSIUM mmol/L 3.1* 3.5 3.3*   CHLORIDE mmol/L 102 102 100   CO2 mmol/L 21.9* 19.5* 21.2*   BUN mg/dL 15 11 9   CREATININE mg/dL 0.43* 0.43* 0.45*   CALCIUM mg/dL 9.9 9.9 9.8   BILIRUBIN mg/dL 0.3 0.3 0.3   ALK PHOS U/L 105 100 106   ALT (SGPT) U/L 13 12 15   AST (SGOT) U/L 14 16 15   GLUCOSE mg/dL 131* 81 112*       Results from last 7 days   Lab Units 02/24/25  0718   HEMOGLOBIN g/dL 9.9*   HEMATOCRIT % 31.4*       No results found for: \"HGBA1C\"         Medications       Scheduled Medications amLODIPine, 2.5 mg, Oral, Q24H  aspirin, 81 mg, Oral, Daily  atorvastatin, 20 mg, Oral, Daily  busPIRone, 10 mg, Oral, Q12H  cefTRIAXone, 1,000 mg, Intravenous, Q24H  cilostazol, 50 mg, Oral, BID  enoxaparin, 30 mg, Subcutaneous, Daily  Julio César, 1 packet, Oral, BID With Meals  losartan, 50 mg, Oral, Daily  sodium chloride, 10 mL, Intravenous, Q12H        Infusions       PRN Medications   acetaminophen    senna-docusate sodium **AND** polyethylene glycol **AND** bisacodyl **AND** bisacodyl    hydrALAZINE    ondansetron ODT **OR** ondansetron    sodium chloride    sodium chloride     Physical Findings        Trending Physical "   Appearance, NFPE    --  Edema  not assessed   Bowel Function None   Tubes Central Line/PICC   Chewing/Swallowing WNL   Skin Other: multiple wounds noted     Estimated/Assessed Needs       Energy Requirements    EST Needs, Method, Wt used 1071-1428kcals/day     30 kcal/kg, 40 kcal/kg   Protein Requirements    EST Needs, Method, Wt used 35-42g protein/day    1.0 - 1.2 gm/kg   Fluid Requirements     Estimated Needs (mL/day) 1428mL per day    1 mL/kcal       Current Nutrition Orders & Evaluation of Intake       Oral Nutrition     Food Allergies    Current PO Diet Diet: Regular/House; Fluid Consistency: Thin (IDDSI 0)   Supplement    PO Evaluation     Trending % PO Intake 2/24: 45% x 5 meals     Enteral Nutrition    Enteral Route    Order, Modulars, Flushes    Residual/Tolerance    TF Observation         Parenteral Nutrition     TPN Route    Total # Days on TPN    TPN Order, Lipid Details    MVI & Trace Element Freq    TPN Observation       Nutrition Diagnosis         Nutrition Dx Problem 1 Underweight r/t COPD as evidenced by BMI of 13.10      Nutrition Dx Problem 2 Increased estimated needs r/t acute illness/underweight BMI as evidenced by need for oral nutrition supplementation.       Intervention Goal         Intervention Goal(s) PO intake to meet >50% of estimated needs  Adhere to supplement ordered  Maintain current body weight       Nutrition Intervention        RD Action Encourage intake, Continue to monitor, Care plan reviewed, and Recommend/order: Boost Very High Calorie daily and Julio César BID     Nutrition Prescription          Diet Prescription Diet: Regular/House; Fluid Consistency: Thin (IDDSI 0)   Supplement Prescription Boost VHC daily and Julio César BID     Enteral Prescription        TPN Prescription      Monitor/Evaluation        Monitor Per protocol, PO intake, Supplement intake, Pertinent labs, Weight, Skin status, GI status, Symptoms, POC/GOC, Swallow function, Hemodynamic stability     RD to  follow-up.    Electronically signed by:  Ariella Dyer RD  02/24/25 08:53 EST

## 2025-02-24 NOTE — PLAN OF CARE
Goal Outcome Evaluation:   No acute events this shift. POC ongoing with antibiotics and med regimen. Pt did work with PT today, and re-educated about the benefits of STR vs home.

## 2025-02-24 NOTE — PLAN OF CARE
Problem: Adult Inpatient Plan of Care  Goal: Plan of Care Review  Outcome: Progressing  Goal: Patient-Specific Goal (Individualized)  Outcome: Progressing  Goal: Absence of Hospital-Acquired Illness or Injury  Outcome: Progressing  Intervention: Identify and Manage Fall Risk  Recent Flowsheet Documentation  Taken 2/24/2025 0400 by Marysol Coello RN  Safety Promotion/Fall Prevention:   safety round/check completed   nonskid shoes/slippers when out of bed   assistive device/personal items within reach   clutter free environment maintained  Taken 2/24/2025 0200 by Marysol Coello RN  Safety Promotion/Fall Prevention:   safety round/check completed   nonskid shoes/slippers when out of bed   assistive device/personal items within reach   clutter free environment maintained  Taken 2/24/2025 0000 by Marysol Coello RN  Safety Promotion/Fall Prevention:   safety round/check completed   nonskid shoes/slippers when out of bed   assistive device/personal items within reach   clutter free environment maintained  Taken 2/23/2025 2200 by Marysol Coello RN  Safety Promotion/Fall Prevention:   safety round/check completed   nonskid shoes/slippers when out of bed   assistive device/personal items within reach   clutter free environment maintained  Taken 2/23/2025 2000 by Marysol Coello RN  Safety Promotion/Fall Prevention:   safety round/check completed   nonskid shoes/slippers when out of bed   clutter free environment maintained   assistive device/personal items within reach  Intervention: Prevent Skin Injury  Recent Flowsheet Documentation  Taken 2/24/2025 0400 by Marysol Coello RN  Body Position:   tilted   left   legs elevated  Skin Protection: incontinence pads utilized  Taken 2/24/2025 0200 by Marysol Coello RN  Body Position:   tilted   right   legs elevated  Skin Protection: incontinence pads utilized  Taken 2/24/2025 0000 by Marysol Coello RN  Body Position:   tilted   left   legs  elevated  Skin Protection: incontinence pads utilized  Taken 2/23/2025 2200 by Marysol Coello RN  Body Position:   tilted   right   legs elevated  Skin Protection: incontinence pads utilized  Taken 2/23/2025 2000 by Marysol Coello RN  Body Position:   tilted   left   legs elevated  Skin Protection: incontinence pads utilized  Intervention: Prevent and Manage VTE (Venous Thromboembolism) Risk  Recent Flowsheet Documentation  Taken 2/23/2025 2000 by Marysol Coello RN  VTE Prevention/Management:   bilateral   SCDs (sequential compression devices) off   patient refused intervention  Intervention: Prevent Infection  Recent Flowsheet Documentation  Taken 2/24/2025 0400 by Marysol Coello RN  Infection Prevention:   environmental surveillance performed   rest/sleep promoted   single patient room provided  Taken 2/24/2025 0200 by Marysol Coello RN  Infection Prevention:   environmental surveillance performed   rest/sleep promoted   single patient room provided  Taken 2/24/2025 0000 by Marysol Coello RN  Infection Prevention:   environmental surveillance performed   rest/sleep promoted   single patient room provided  Taken 2/23/2025 2200 by Marysol Coello RN  Infection Prevention:   environmental surveillance performed   rest/sleep promoted   single patient room provided  Taken 2/23/2025 2000 by Marysol Coello RN  Infection Prevention:   environmental surveillance performed   single patient room provided   rest/sleep promoted  Goal: Optimal Comfort and Wellbeing  Outcome: Progressing  Intervention: Provide Person-Centered Care  Recent Flowsheet Documentation  Taken 2/23/2025 2000 by Marysol Coello RN  Trust Relationship/Rapport:   care explained   choices provided   emotional support provided  Goal: Readiness for Transition of Care  Outcome: Progressing   Goal Outcome Evaluation:            VSS on RA.   Repeat chest Xray in the AM.  No acute events during shift.  No further  complaints from patient at this time.  POC ongoing.

## 2025-02-24 NOTE — THERAPY EVALUATION
Patient Name: Tracey Nelson  : 1940    MRN: [id number]                              Today's Date: 2025       Admit Date: 2025    Visit Dx:     ICD-10-CM ICD-9-CM   1. Pneumonia of left lower lobe due to infectious organism  J18.9 486   2. Sepsis, due to unspecified organism, unspecified whether acute organ dysfunction present  A41.9 038.9     995.91   3. Transient confusion  R41.0 298.9     Patient Active Problem List   Diagnosis    Abnormal CT of the chest    Chronic cough    Closed left hip fracture    Peripheral artery disease    Severe malnutrition    Pneumonia     Past Medical History:   Diagnosis Date    Allergies     Bronchitis     Cataract, bilateral     s/p surgery    Coronary artery disease     Hyperlipidemia     Impaired mobility     LBBB (left bundle branch block) 2018    Myocardial infarction     pt denies    PAD (peripheral artery disease)     stent in each leg    Pneumonia     Skin cancer     Wears dentures     upper plate and lower is a partial     Past Surgical History:   Procedure Laterality Date    CAROTID ENDARTERECTOMY Left     CATARACT EXTRACTION W/ INTRAOCULAR LENS  IMPLANT, BILATERAL      COLONOSCOPY      DILATION AND CURETTAGE, DIAGNOSTIC / THERAPEUTIC      HIP BIPOLAR REPLACEMENT Left 2024    Procedure: HIP BIPOLAR REPLACEMENT LEFT;  Surgeon: Cedrick Gomez MD;  Location: [facility];  Service: Orthopedics;  Laterality: Left;    OTHER SURGICAL HISTORY Bilateral     stent in each leg    SKIN BIOPSY      SKIN CANCER EXCISION      TONSILLECTOMY        General Information       Row Name 25 1630          Physical Therapy Time and Intention    Document Type evaluation  -HW     Mode of Treatment physical therapy  -       Row Name 25 1630          General Information    Patient Profile Reviewed yes  -HW     Prior Level of Function independent:;all household mobility  uses a RW as needed  -HW     Existing Precautions/Restrictions fall  -HW     Barriers to  Rehab medically complex  -       Row Name 02/24/25 1630          Living Environment    People in Home grandchild(patsy)  -       Row Name 02/24/25 1630          Home Main Entrance    Number of Stairs, Main Entrance none  -       Row Name 02/24/25 1630          Stairs Within Home, Primary    Stairs, Within Home, Primary multilevel home; pt resides on primary level  -     Number of Stairs, Within Home, Primary twelve  -       Row Name 02/24/25 1630          Cognition    Orientation Status (Cognition) oriented x 4  -       Row Name 02/24/25 1630          Safety Issues/Impairments Affecting Functional Mobility    Safety Issues Affecting Function (Mobility) awareness of need for assistance;positioning of assistive device;insight into deficits/self-awareness;safety precaution awareness;safety precautions follow-through/compliance;problem-solving;sequencing abilities  -     Impairments Affecting Function (Mobility) balance;endurance/activity tolerance;postural/trunk control;strength;shortness of breath;motor control  -               User Key  (r) = Recorded By, (t) = Taken By, (c) = Cosigned By      Initials Name Provider Type     Carol Cruz, PT Physical Therapist                   Mobility       Row Name 02/24/25 1631          Bed Mobility    Bed Mobility supine-sit;sit-supine;scooting/bridging  -     Scooting/Bridging [place] (Bed Mobility) moderate assist (50% patient effort);verbal cues  -     Supine-Sit [place] (Bed Mobility) moderate assist (50% patient effort);verbal cues  -     Sit-Supine [place] (Bed Mobility) moderate assist (50% patient effort);verbal cues  -     Assistive Device (Bed Mobility) head of bed elevated;repositioning sheet;bed rails  -       Row Name 02/24/25 1631          Sit-Stand Transfer    Sit-Stand [place] (Transfers) moderate assist (50% patient effort)  -     Assistive Device (Sit-Stand Transfers) walker, front-wheeled  -     Comment,  (Sit-Stand Transfer) required VC for proper hand placement on RW  -HW       Row Name 02/24/25 1631          Gait/Stairs (Locomotion)    [place] Level (Gait) moderate assist (50% patient effort)  -     Assistive Device (Gait) walker, front-wheeled  -     Patient was able to Ambulate yes  -     Distance in Feet (Gait) 3  3'+3'  -     Deviations/Abnormal Patterns (Gait) bilateral deviations;asher decreased;festinating/shuffling;base of support, narrow;gait speed decreased;stride length decreased  -     Bilateral Gait Deviations forward flexed posture;knee buckling bilaterally;heel strike decreased  -     [place] Level (Stairs) not tested  -               User Key  (r) = Recorded By, (t) = Taken By, (c) = Cosigned By      Initials Name Provider Type    Carol Maldonado PT Physical Therapist                   Obj/Interventions       Row Name 02/24/25 1634          Range of Motion Comprehensive    General Range of Motion bilateral lower extremity ROM WFL  -       Row Name 02/24/25 1634          Strength Comprehensive (MMT)    General Manual Muscle Testing (MMT) Assessment lower extremity strength deficits identified  -     Comment, General Manual Muscle Testing (MMT) Assessment BLE MMT grossly 4-/5  -HW       Row Name 02/24/25 1634          Balance    Balance Assessment sitting static balance;sitting dynamic balance;standing static balance;standing dynamic balance  -     Static Sitting Balance standby assist  -     Dynamic Sitting Balance standby assist  -     Position, Sitting Balance unsupported;sitting edge of bed  -     Static Standing Balance minimal assist  -     Dynamic Standing Balance moderate assist  -     Position/Device Used, Standing Balance supported;walker, front-wheeled  -               User Key  (r) = Recorded By, (t) = Taken By, (c) = Cosigned By      Initials Name Provider Type    Carol Maldonado PT Physical Therapist                   Goals/Plan       [facility]  Name 02/24/25 1645          Bed Mobility Goal 1 (PT)    Activity/Assistive Device (Bed Mobility Goal 1, PT) bed mobility activities, all  -HW     [place] Level/Cues Needed (Bed Mobility Goal 1, PT) contact guard required  -HW     Time Frame (Bed Mobility Goal 1, PT) long term goal (LTG);10 days  -HW     Progress/Outcomes (Bed Mobility Goal 1, PT) new goal  -       Row Name 02/24/25 1645          Transfer Goal 1 (PT)    Activity/Assistive Device (Transfer Goal 1, PT) transfers, all  -HW     [place] Level/Cues Needed (Transfer Goal 1, PT) minimum assist (75% or more patient effort)  -HW     Time Frame (Transfer Goal 1, PT) long term goal (LTG);10 days  -HW     Progress/Outcome (Transfer Goal 1, PT) new goal  -       Row Name 02/24/25 1645          Gait Training Goal 1 (PT)    Activity/Assistive Device (Gait Training Goal 1, PT) gait (walking locomotion)  -HW     [place] Level (Gait Training Goal 1, PT) minimum assist (75% or more patient effort)  -HW     Distance (Gait Training Goal 1, PT) 50  -HW     Time Frame (Gait Training Goal 1, PT) long term goal (LTG);10 days  -HW     Progress/Outcome (Gait Training Goal 1, PT) new goal  -       Row Name 02/24/25 1647          Patient Education Goal (PT)    Activity (Patient Education Goal, PT) Pt will perform 1x10 LE TE with mod (I) to allow for improved LE strength and endurance  -HW     [place]/Cues/Accuracy (Memory Goal 2, PT) demonstrates adequately  -HW     Time Frame (Patient Education Goal, PT) short term goal (STG);5 days  -HW     Progress/Outcome (Patient Education Goal, PT) new goal  -       Row Name 02/24/25 9177          Therapy Assessment/Plan (PT)    Planned Therapy Interventions (PT) balance training;bed mobility training;gait training;home exercise program;motor coordination training;neuromuscular re-education;stretching;strengthening;postural re-education;patient/family education;ROM (range of motion);transfer training  -HW                User Key  (r) = Recorded By, (t) = Taken By, (c) = Cosigned By      Initials Name Provider Type    Carol Maldonado, KEELY Physical Therapist                   Clinical Impression       Row Name 02/24/25 1633          Pain    Pretreatment Pain Rating 0/10 - no pain  -     Posttreatment Pain Rating 0/10 - no pain  -       Row Name 02/24/25 1634          Plan of Care Review    Plan of Care Reviewed With patient  -     Progress no change  -     Outcome Evaluation Pt participated in PT initial evaluation this date. Pt presents supine in bed with RN at side, pleasant and agreeable to PT evaluation. Pt AOx4, denies reports of pain at rest. Pt reports at baseline, she lives at home with her grandson in a multilevel home with 0 GERRY. Pt reports she is normally (I) with all household mobility without AD, states she uses a RW as needed if she feels weak. Pt denies reports of falls at home. Pt performed supine > sit on EOB with mod A, required assistance to position trunk upright on 2 attempts d/t lateral LOB from fatigue on initial supine > sit. Pt was able to maintain static sitting balance with SBA. Pt performed STS transfer with mod A and use of a RW. Pt was able to ambulated x3' to BSC with RW and mod A, demonstrated frequent LOB that required PT assistance to correct as well as consistent VC for safety throughout transfer. After toileting, pt performed additional STS transfer with mod A and ambulated x3' back to bed with mod A and RW. Pt declined further attempt at mobility d/t rapid fatigue with mobility to BSC. Pt declined attempt to sit in bedside chair d/t fatigue. Following evaluation, pt left R sidelying in bed with call light and all needs within reach, RN aware. Recommend skilled PT intervention during IP Admission to address identified deficits, reduce risk of falls and prevent further functional decline. Once medically stable, recommend pt to d/c to STR (subacute) to address remaining deficits.  -        Row Name 02/24/25 1634          Therapy Assessment/Plan (PT)    Patient/Family Therapy Goals Statement (PT) Pt is eager to return to [facility]  -     Rehab Potential (PT) good  -     Criteria for Skilled Interventions Met (PT) yes  -HW     Therapy Frequency (PT) 5 times/wk  -HW     Predicted Duration of Therapy Intervention (PT) 10 days  -       Row Name 02/24/25 1634          Vital Signs    Pre Systolic BP Rehab 151  -HW     Pre Treatment Diastolic BP 78  -HW     Pretreatment Heart Rate (beats/min) 92  -HW     Pre SpO2 (%) 92  -HW     O2 Delivery Pre Treatment room air  -HW     O2 Delivery Intra Treatment room air  -HW     O2 Delivery Post Treatment room air  -HW     Pre Patient Position Supine  -HW     Intra Patient Position Standing  -HW     Post Patient Position Supine  -HW       Row Name 02/24/25 1634          Positioning and Restraints    Pre-Treatment Position in bed  -HW     Post Treatment Position bed  -HW     In Bed supine;call light within reach;encouraged to call for assist;notified [facility]  -               User Key  (r) = Recorded By, (t) = Taken By, (c) = Cosigned By      Initials Name Provider Type     Carol Cruz, PT Physical Therapist                   Outcome Measures       Row Name 02/24/25 1646 02/24/25 0830       How much help from another person do you currently need...    Turning from your back to your side while in flat bed without using bedrails? 2  -HW 2  -HH    Moving from lying on back to sitting on the side of a flat bed without bedrails? 2  -HW 2  -HH    Moving to and from a bed to a chair (including a wheelchair)? 2  -HW 2  -HH    Standing up from a chair using your arms (e.g., wheelchair, bedside chair)? 2  -HW 2  -HH    Climbing 3-5 steps with a railing? 1  -HW 1  -HH    To walk in hospital room? 2  -HW 2  -HH    AM-PAC 6 Clicks Score (PT) 11  -HW 11  -HH    Highest Level of Mobility Goal 4 --> Transfer to chair/commode  -HW 4 --> Transfer to chair/commode  -HH      Row Name  02/24/25 1646          Functional Assessment    Outcome Measure Options AM-PAC 6 Clicks Basic Mobility (PT)  -               User Key  (r) = Recorded By, (t) = Taken By, (c) = Cosigned By      Initials Name Provider Type     Carol Branham, RN Registered Nurse     Carol Cruz PT Physical Therapist                                 Physical Therapy Education       Title: PT OT SLP Therapies (In Progress)       Topic: Physical Therapy (In Progress)       Point: Mobility training (Done)       Learning Progress Summary            Patient Acceptance, E,TB, VU by  at 2/24/2025 1646    Comment: role of PT during IP admission                      Point: Home exercise program (Not Started)       Learner Progress:  Not documented in this visit.              Point: Body mechanics (Not Started)       Learner Progress:  Not documented in this visit.              Point: Precautions (Not Started)       Learner Progress:  Not documented in this visit.                              User Key       Initials Effective Dates Name Provider Type Discipline     11/29/23 -  Carol Cruz PT Physical Therapist PT                  PT Recommendation and Plan  Planned Therapy Interventions (PT): balance training, bed mobility training, gait training, home exercise program, motor coordination training, neuromuscular re-education, stretching, strengthening, postural re-education, patient/family education, ROM (range of motion), transfer training  Progress: no change  Outcome Evaluation: Pt participated in PT initial evaluation this date. Pt presents supine in bed with RN at side, pleasant and agreeable to PT evaluation. Pt AOx4, denies reports of pain at rest. Pt reports at baseline, she lives at home with her grandson in a multilevel home with 0 GERRY. Pt reports she is normally (I) with all household mobility without AD, states she uses a RW as needed if she feels weak. Pt denies reports of falls at home. Pt performed supine > sit on EOB  with mod A, required assistance to position trunk upright on 2 attempts d/t lateral LOB from fatigue on initial supine > sit. Pt was able to maintain static sitting balance with SBA. Pt performed STS transfer with mod A and use of a RW. Pt was able to ambulated x3' to BS with RW and mod A, demonstrated frequent LOB that required PT assistance to correct as well as consistent VC for safety throughout transfer. After toileting, pt performed additional STS transfer with mod A and ambulated x3' back to bed with mod A and RW. Pt declined further attempt at mobility d/t rapid fatigue with mobility to [facility]. Pt declined attempt to sit in bedside chair d/t fatigue. Following evaluation, pt left R sidelying in bed with call light and all needs within reach, RN aware. Recommend skilled PT intervention during IP Admission to address identified deficits, reduce risk of falls and prevent further functional decline. Once medically stable, recommend pt to d/c to STR (subacute) to address remaining deficits.     Time Calculation:   PT Evaluation Complexity  History, PT Evaluation Complexity: 1-2 personal factors and/or comorbidities  Examination of Body Systems (PT Eval Complexity): 1-2 elements  Clinical Presentation (PT Evaluation Complexity): stable  Clinical Decision Making (PT Evaluation Complexity): low complexity  Overall Complexity (PT Evaluation Complexity): low complexity     PT Charges       Row Name 02/24/25 5337             Time Calculation    Start Time 1433  -HW      PT Received On 02/24/25  -      PT Goal Re-Cert Due Date 03/06/25  -         Untimed Charges    PT Eval/Re-eval Minutes 45  -HW         Total Minutes    Untimed Charges Total Minutes 45  -HW       Total Minutes 45  -HW                User Key  (r) = Recorded By, (t) = Taken By, (c) = Cosigned By      Initials Name Provider Type     Carol Cruz PT Physical Therapist                  Therapy Charges for Today       Code Description Service Date  Service Provider Modifiers Qty    33693015720 HC PT EVAL LOW COMPLEXITY 3 2/24/2025 Carol Cruz, PT GP 1            PT G-Codes  Outcome Measure Options: AM-PAC 6 Clicks Basic Mobility (PT)  AM-PAC 6 Clicks Score (PT): 11  PT Discharge Summary  Anticipated Discharge Disposition (PT): sub acute care setting    Carol Cruz, KEELY  2/24/2025

## 2025-02-24 NOTE — CASE MANAGEMENT/SOCIAL WORK
Case Management/Social Work    Patient Name:  Tracey Nelson  YOB: 1940  MRN: [id number]  Admit Date:  2/21/2025        DCP; Home with family     Discharge Plan       Row Name 02/24/25 1015       Plan    Plan DCP; Home with family    Plan Comments met with pt at bedside. She states her grandson Maykel lives with her and assists with her care when she needs it. She is not medically stable for discharge at this time. CM continues to follow for any needs                        Electronically signed by:  Jose Hyman RN  02/24/25 12:59 EST

## 2025-02-24 NOTE — PLAN OF CARE
Goal Outcome Evaluation:  Plan of Care Reviewed With: patient        Progress: no change  Outcome Evaluation: Pt participated in PT initial evaluation this date. Pt presents supine in bed with RN at side, pleasant and agreeable to PT evaluation. Pt AOx4, denies reports of pain at rest. Pt reports at baseline, she lives at home with her grandson in a multilevel home with 0 GERRY. Pt reports she is normally (I) with all household mobility without AD, states she uses a RW as needed if she feels weak. Pt denies reports of falls at home. Pt performed supine > sit on EOB with mod A, required assistance to position trunk upright on 2 attempts d/t lateral LOB from fatigue on initial supine > sit. Pt was able to maintain static sitting balance with SBA. Pt performed STS transfer with mod A and use of a RW. Pt was able to ambulated x3' to BSC with RW and mod A, demonstrated frequent LOB that required PT assistance to correct as well as consistent VC for safety throughout transfer. After toileting, pt performed additional STS transfer with mod A and ambulated x3' back to bed with mod A and RW. Pt declined further attempt at mobility d/t rapid fatigue with mobility to BSC. Pt declined attempt to sit in bedside chair d/t fatigue. Following evaluation, pt left R sidelying in bed with call light and all needs within reach, RN aware. Recommend skilled PT intervention during IP Admission to address identified deficits, reduce risk of falls and prevent further functional decline. Once medically stable, recommend pt to d/c to STR (subacute) to address remaining deficits.    Anticipated Discharge Disposition (PT): sub acute care setting

## 2025-02-25 ENCOUNTER — READMISSION MANAGEMENT (OUTPATIENT)
Dept: CALL CENTER | Facility: HOSPITAL | Age: 85
End: 2025-02-25
Payer: MEDICARE

## 2025-02-25 VITALS
OXYGEN SATURATION: 94 % | HEART RATE: 112 BPM | RESPIRATION RATE: 18 BRPM | BODY MASS INDEX: 13.11 KG/M2 | HEIGHT: 65 IN | TEMPERATURE: 98.1 F | WEIGHT: 78.7 LBS | SYSTOLIC BLOOD PRESSURE: 146 MMHG | DIASTOLIC BLOOD PRESSURE: 71 MMHG

## 2025-02-25 PROCEDURE — 99238 HOSP IP/OBS DSCHRG MGMT 30/<: CPT | Performed by: FAMILY MEDICINE

## 2025-02-25 PROCEDURE — 25010000002 CEFTRIAXONE PER 250 MG: Performed by: STUDENT IN AN ORGANIZED HEALTH CARE EDUCATION/TRAINING PROGRAM

## 2025-02-25 PROCEDURE — 97166 OT EVAL MOD COMPLEX 45 MIN: CPT

## 2025-02-25 RX ORDER — CEFDINIR 300 MG/1
300 CAPSULE ORAL 2 TIMES DAILY
Qty: 10 CAPSULE | Refills: 0 | Status: SHIPPED | OUTPATIENT
Start: 2025-02-25 | End: 2025-02-25

## 2025-02-25 RX ORDER — AMLODIPINE BESYLATE 5 MG/1
5 TABLET ORAL
Qty: 30 TABLET | Refills: 0 | Status: SHIPPED | OUTPATIENT
Start: 2025-02-26 | End: 2025-02-25

## 2025-02-25 RX ORDER — METOPROLOL SUCCINATE 25 MG/1
25 TABLET, EXTENDED RELEASE ORAL DAILY
Qty: 30 TABLET | Refills: 0 | Status: SHIPPED | OUTPATIENT
Start: 2025-02-25 | End: 2025-03-27

## 2025-02-25 RX ORDER — AMLODIPINE BESYLATE 5 MG/1
5 TABLET ORAL
Qty: 30 TABLET | Refills: 0 | Status: SHIPPED | OUTPATIENT
Start: 2025-02-26 | End: 2025-03-28

## 2025-02-25 RX ORDER — METOPROLOL SUCCINATE 25 MG/1
25 TABLET, EXTENDED RELEASE ORAL DAILY
Qty: 30 TABLET | Refills: 0 | Status: SHIPPED | OUTPATIENT
Start: 2025-02-25 | End: 2025-02-25

## 2025-02-25 RX ORDER — CEFDINIR 300 MG/1
300 CAPSULE ORAL 2 TIMES DAILY
Qty: 10 CAPSULE | Refills: 0 | Status: SHIPPED | OUTPATIENT
Start: 2025-02-25 | End: 2025-03-02

## 2025-02-25 RX ADMIN — BUSPIRONE HYDROCHLORIDE 10 MG: 10 TABLET ORAL at 08:50

## 2025-02-25 RX ADMIN — AMLODIPINE BESYLATE 5 MG: 5 TABLET ORAL at 08:51

## 2025-02-25 RX ADMIN — ATORVASTATIN CALCIUM 20 MG: 40 TABLET, FILM COATED ORAL at 08:51

## 2025-02-25 RX ADMIN — ASPIRIN 81 MG CHEWABLE TABLET 81 MG: 81 TABLET CHEWABLE at 08:51

## 2025-02-25 RX ADMIN — Medication 10 ML: at 08:51

## 2025-02-25 RX ADMIN — CILOSTAZOL 50 MG: 100 TABLET ORAL at 08:51

## 2025-02-25 RX ADMIN — CEFTRIAXONE 1000 MG: 1 INJECTION, POWDER, FOR SOLUTION INTRAMUSCULAR; INTRAVENOUS at 08:51

## 2025-02-25 RX ADMIN — Medication 1 PACKET: at 08:50

## 2025-02-25 RX ADMIN — LOSARTAN POTASSIUM 50 MG: 50 TABLET, FILM COATED ORAL at 08:51

## 2025-02-25 NOTE — DISCHARGE SUMMARY
"    [facility]      Name:  Tracey Nelson   Age:  84 y.o.  Sex:  female  :  1940  MRN:  [id number]   Visit Number:  [id number]    Admission Date:  2025  Date of Discharge:  2025  Primary Care Physician:  Leola Wright MD    Important issues to note:    Patient requesting home health, does not wish to look into facility placement at this time.  Is also getting a hospital bed for today.    Follow-up with PCP.  Could consider bronchoscopy if patient changes her mind moving forward for lung findings.    Discharge Diagnoses:     Pneumonia  Metabolic encephalopathy  Hypertension  Anxiety disorder  UTI    Problem List:     Active Hospital Problems    Diagnosis  POA    **Pneumonia [J18.9]  Yes      Resolved Hospital Problems   No resolved problems to display.     Presenting Problem:    Chief Complaint   Patient presents with    Weakness - Generalized     Pt CO generalized weakness since December but states that it's been getting progressively worse. Pt experienced acute ams episode last night but has since \"snapped\" out of it. Pt family reports that usually when this happens she has a UTI.       Consults:     Consulting Physician(s)                     None              Procedures Performed:        History of presenting illness/Hospital Course:    84-year-old history of longstanding tobacco abuse, debility, weakness, pulmonary nodule/lymphadenopathy, who presented from home due to confusion and possible UTI.  Patient lives with grandson typically.  She has had declining functional status at home, PCP had recommended prior consideration of assisted living versus nursing home facility due to ongoing debility.    In the ER that she was overall hemodynamically stable.  Possible UTI noted on urinalysis.  CT imaging showing possible left lower lobe pneumonia and lymphadenopathy with recommendation for bronchoscopy for further evaluation.    Patient was " admitted and given IV fluid resuscitation, antibiotics.  Her oxygenation remained stable.  She otherwise has been feeling better, is back to baseline mental status after discussion with family at bedside.    Patient does have significant weakness, however patient is refusing any placement at this time after discussion with both her son and her sister at bedside.  She wishes to try home health, but understands she may have further decline and need to be placed.  I encouraged her to follow-up with PCP to discuss this further.    Of note, I did talk with patient about potential bronchoscopy, she notes she had this recommended last year at [facility] due to her lung findings.  She is still refusing further workup.  I think this is reasonable in the setting of her overall poor functional capacity if in fact she has underlying malignancy.    Vital Signs:    Temp:  [97.6 °F (36.4 °C)-98.1 °F (36.7 °C)] 97.6 °F (36.4 °C)  Heart Rate:  [] 116  Resp:  [16-18] 17  BP: (151-168)/(62-78) 162/64    Physical Exam:    General Appearance:  Alert and cooperative.  Frail appearing   Head:  Atraumatic and normocephalic.   Eyes: Conjunctivae and sclerae normal, no icterus. No pallor.   Ears:  Ears with no abnormalities noted.   Throat: No oral lesions, no thrush, oral mucosa moist.   Neck: Supple, trachea midline, no thyromegaly.   Back:   No kyphoscoliosis present. No tenderness to palpation.   Lungs:   Breath sounds heard bilaterally equally.  No crackles or wheezing. No Pleural rub or bronchial breathing.   Heart:  Normal S1 and S2, no murmur, no gallop, no rub. No JVD.   Abdomen:   Normal bowel sounds, no masses, no organomegaly. Soft, nontender, nondistended, no rebound tenderness.   Extremities: Supple, no edema, no cyanosis, no clubbing.   Pulses: Pulses palpable bilaterally.   Skin: No bleeding or rash.   Neurologic: Alert and oriented x 3. No facial asymmetry. Moves all four limbs. No tremors.     Pertinent Lab Results:      Results from last 7 days   Lab Units 02/24/25  0718 02/23/25  0706 02/22/25  0947   SODIUM mmol/L 135* 133* 133*   POTASSIUM mmol/L 3.1* 3.5 3.3*   CHLORIDE mmol/L 102 102 100   CO2 mmol/L 21.9* 19.5* 21.2*   BUN mg/dL 15 11 9   CREATININE mg/dL 0.43* 0.43* 0.45*   CALCIUM mg/dL 9.9 9.9 9.8   BILIRUBIN mg/dL 0.3 0.3 0.3   ALK PHOS U/L 105 100 106   ALT (SGPT) U/L 13 12 15   AST (SGOT) U/L 14 16 15   GLUCOSE mg/dL 131* 81 112*     Results from last 7 days   Lab Units 02/24/25  0718 02/23/25  0706 02/22/25  0947   WBC 10*3/mm3 11.44* 13.40* 11.16*   HEMOGLOBIN g/dL 9.9* 10.7* 11.3*   HEMATOCRIT % 31.4* 33.6* 36.9   PLATELETS 10*3/mm3 412 345 333         Results from last 7 days   Lab Units 02/22/25  0947 02/21/25  1504 02/21/25  1352   HSTROP T ng/L 26* 51* 32*                     Results from last 7 days   Lab Units 02/21/25  1539 02/21/25  1523 02/21/25  1345   BLOODCX  No growth at 3 days No growth at 3 days  --    URINECX   --   --  >100,000 CFU/mL Mixed Marina Isolated       Pertinent Radiology Results:    Imaging Results (All)       Procedure Component Value Units Date/Time    XR Chest 1 View [[id number]] Collected: 02/24/25 0713     Updated: 02/24/25 0716    Narrative:      PROCEDURE: XR CHEST 1 VW-     HISTORY: f/u cough; J18.9-Pneumonia, unspecified organism; A41.9-Sepsis,  unspecified organism; R41.0-Disorientation, unspecified     COMPARISON: February 21, 2025..     FINDINGS: The heart is likely stable but difficult to evaluate due to  opacification of the inferior half of the left hemithorax, similar to  prior exam. This is likely secondary to combination of pleural effusion  and left basilar airspace disease/atelectasis. Effusion appears  minimally increased compared to the prior.. Bilateral interstitial  disease is stable. Patient is rotated to the left. Aortic mural  calcifications noted.. The mediastinum is unremarkable. There is no  pneumothorax.  There are no acute osseous abnormalities. Apical  lordotic  positioning noted.        Impression:      Minimally increased left pleural effusion, otherwise stable  chest..              This report was signed and finalized on 2/24/2025 7:14 AM by Shelly Cortez MD.       CT Head Without Contrast [[id number]] Collected: 02/21/25 1626     Updated: 02/21/25 1639    Narrative:      PROCEDURE: CT HEAD WO CONTRAST-     HISTORY: Transient confusion     COMPARISON: 2/19/2024.     TECHNIQUE: Multiple axial CT images were performed from the foramen  magnum to the vertex. Individualized dose reduction techniques using  automated exposure control or adjustment of mA and/or kV according to  the patient size were employed.      FINDINGS: There is moderate generalized cerebral atrophy. The ventricles  are enlarged. There are moderate white matter changes consistent with  chronic microvascular ischemic disease. There is no evidence of edema or  hemorrhage.  No masses are identified. No extra-axial fluid is seen. The  paranasal sinuses are unremarkable.       Impression:      Atrophy and chronic change without acute process.                       Images were reviewed, interpreted, and dictated by Dr. Ahsan Hui MD  Transcribed by Dallas Cervantes PA-C.     This report was signed and finalized on 2/21/2025 4:37 PM by Ahsan Hui MD.       CT Chest Without Contrast Diagnostic [[id number]] Collected: 02/21/25 1627     Updated: 02/21/25 1639    Narrative:      PROCEDURE: CT CHEST WO CONTRAST DIAGNOSTIC-     HISTORY: LLQ infiltrate     COMPARISON: Previous day.     PROCEDURE:  Multiple axial CT images were obtained from the thoracic  inlet through the upper abdomen without the use of contrast.      FINDINGS: There is left hilar enlargement.. Heart size is normal. The  aorta is normal in caliber. Interval worsening of left lower lobe  pneumonia. There is opacification of the left lower lobe bronchus,  endobronchial lesion is not excluded. There is a small left pleural  effusion. No  pericardial or right pleural effusion. There are changes of  emphysema. The visualized upper abdomen is unremarkable. Bone windows  reveal no acute osseous abnormalities.       Impression:      Extensive left lower lobe pneumonia with possible  postobstructive change, consider follow-up bronchoscopy.     Small left pleural effusion.     Left hilar adenopathy suspected.        This study was performed with techniques to keep radiation doses as low  as reasonably achievable (ALARA). Individualized dose reduction  techniques using automated exposure control or adjustment of mA and/or  kV according to the patient size were employed.            Images were reviewed, interpreted, and dictated by Dr. Ahsan Hui MD  Transcribed by Dallas Cervantes PA-C.     This report was signed and finalized on 2/21/2025 4:37 PM by Ahsan Hui MD.       XR Chest 1 View [[id number]] Collected: 02/21/25 1500     Updated: 02/21/25 1503    Narrative:      PROCEDURE: XR CHEST 1 VW-     HISTORY: AMS     COMPARISON: 2/20/2024.     FINDINGS: The heart is normal in size. The mediastinum is unremarkable.  There is a stable left lower lobe opacity compatible with pleural  effusion and underlying pneumonia. The right lung is clear. There is no  pneumothorax.  There are no acute osseous abnormalities.       Impression:      No significant interval change.              Images were reviewed, interpreted, and dictated by Dr. Ahsan Hui MD  Transcribed by Dallas Cervantes PA-C.     This report was signed and finalized on 2/21/2025 3:01 PM by Ahsan Hui MD.               Echo:    Results for orders placed during the hospital encounter of 02/19/24    Adult Transthoracic Echo Complete W/ Cont if Necessary Per Protocol    Interpretation Summary    Left ventricular systolic function is normal. Calculated left ventricular 3D EF = 55% Left ventricular ejection fraction appears to be 56 - 60%.    Grade II diastolic dysfunction. Left ventricular wall  thickness is consistent with mild concentric hypertrophy.    Normal right ventricular size and function.    The right atrial cavity is dilated.    Mild tricuspid valve regurgitation is present. Estimated right ventricular systolic pressure from tricuspid regurgitation is moderately elevated (45-55 mmHg).    There is a moderate (1-2cm) circumferential pericardial effusion measuring largest anteriorly along the RV free wall at 1.6 cm. There is no echocardiographic evidence of cardiac tamponade.    Condition on Discharge:      Stable.    Code status during the hospital stay:    Code Status and Medical Interventions: CPR (Attempt to Resuscitate); Full Support   Ordered at: 02/22/25 1459     Code Status (Patient has no pulse and is not breathing):    CPR (Attempt to Resuscitate)     Medical Interventions (Patient has pulse or is breathing):    Full Support     Discharge Disposition:    Home or Self Care    Discharge Medications:       Discharge Medications        New Medications        Instructions Start Date   cefdinir 300 MG capsule  Commonly known as: OMNICEF   300 mg, Oral, 2 Times Daily      metoprolol succinate XL 25 MG 24 hr tablet  Commonly known as: Toprol XL   25 mg, Oral, Daily             Changes to Medications        Instructions Start Date   amLODIPine 5 MG tablet  Commonly known as: NORVASC  What changed:   medication strength  how much to take  when to take this   5 mg, Oral, Every 24 Hours Scheduled   Start Date: February 26, 2025            Continue These Medications        Instructions Start Date   Aspirin 81 MG capsule   Take 1 tablet by mouth Daily. Indications: Arthritis      busPIRone 10 MG tablet  Commonly known as: BUSPAR   buspirone 10 mg tablet   Two times a day as needed      cilostazol 50 MG tablet  Commonly known as: PLETAL   1 tablet, Oral, 2 Times Daily      cyanocobalamin 250 MCG tablet  Commonly known as: VITAMIN B-12   Vitamin B-12 250 mcg tablet Take 1 Daily      escitalopram 10 MG  tablet  Commonly known as: LEXAPRO   1 tablet, Oral, Daily      losartan 50 MG tablet  Commonly known as: COZAAR   1 tablet, Oral, Daily      simvastatin 40 MG tablet  Commonly known as: ZOCOR   1 tablet, Oral, Every Night at Bedtime      Trelegy Ellipta 200-62.5-25 MCG/ACT inhaler  Generic drug: Fluticasone-Umeclidin-Vilant   1 puff, Inhalation, Daily With Lunch             Discharge Diet:   As tolerated    Activity at Discharge:   As tolerated    Follow-up Appointments:    Additional Instructions for the Follow-ups that You Need to Schedule       Ambulatory Referral to Home Health (Hospital)   As directed      RESUMPTION OF CARE    Order Comments: RESUMPTION OF CARE    Face to Face Visit Date: 2/25/2025   Follow-up provider for Plan of Care?: I treated the patient in an acute care facility and will not continue treatment after discharge.   Follow-up provider: LEOLA WRIGHT [[id number]]   Reason/Clinical Findings: weakness, impaired adls   Describe mobility limitations that make leaving home difficult: weakness, impaired adls   Nursing/Therapeutic Services Requested: Skilled Nursing Physical Therapy Occupational Therapy   Skilled nursing orders: Medication education   PT orders: Therapeutic exercise Strengthening   Occupational orders: Activities of daily living   Frequency: 1 Week 1               Follow-up Information       Leola Wright MD Follow up in 1 week(s).    Specialty: Internal Medicine  Contact information:  [address] [place] [address] [phone]                           No future appointments.  Test Results Pending at Discharge:    Pending Results       None                 Judith Rhodes DO  02/25/25  10:22 EST    Time: I spent 21 minutes on this discharge activity which included: face-to-face encounter with the patient, reviewing the data in the system, coordination of the care with the nursing staff as well as consultants, documentation, and entering orders.     Dictated  utilizing Dragon dictation.

## 2025-02-25 NOTE — CASE MANAGEMENT/SOCIAL WORK
Case Management Discharge Note           Provided Post Acute Provider List?: N/A    Selected Continued Care - Admitted Since 2/21/2025       Destination    No services have been selected for the patient.                Durable Medical Equipment    No services have been selected for the patient.                Dialysis/Infusion    No services have been selected for the patient.                Home Medical Care    No services have been selected for the patient.                Therapy    No services have been selected for the patient.                Community Resources    No services have been selected for the patient.                Community & DME    No services have been selected for the patient.                    Transportation Services  Private: Car    Final Discharge Disposition Code: 06 - home with home health care

## 2025-02-25 NOTE — CASE MANAGEMENT/SOCIAL WORK
Spoke to pt and sister at bedside Plan is to return home grandson lives with her and assists Sister reports a hospital bed is being delivered today She is active with Home health  [facility] . Called Son confirmed plan is for DC home They have all DME needed They are planning on transporting home She came to the  ER in a Private Car   Called and Faxed referral to [facility] in [place]

## 2025-02-25 NOTE — NURSING NOTE
"RN educated pt on importance of getting bloodwork this AM. Pt is refusing lab work and states she is wanting to wait until her family gets here today. Pt is alert to name, , where she is but but confused at times of day/time. Pt states, \"I do not want to be stuck right now.\"   "

## 2025-02-25 NOTE — PLAN OF CARE
Goal Outcome Evaluation:  Plan of Care Reviewed With: patient, sibling        Progress: no change  Outcome Evaluation: OT eval completed. Patient is supine in bed, sister is present at bedside. Patient is Ox4, reports generalized pain across ribcage and chest secondary to coughing. Patient/sister both contribute to history. Patient lives with her grandson and states she is ind with all HH mobility and ADLs, but given overall fraility, weakness and deconditioning unsure of the accuracy of this. Patient has a RW, walk in shower, [facility] of getting hospital bed. Sister reports patient primarily sits on the couch during the day, has a sacral wound. Patient performed supine to sit with mod A, sat EOB with min-mod A for postural control. Required max A to return to supine as patient unable to tolerate further activity. Patient rolled left/right mod A for perineal hygiene and brief change d/t urinary incontinence. Patient requires increased assistance for all ADLs, informed patient's sister that if she returns home she will need increased assistance with all self care and mobility. Patient's sister reports several family memebers lives nearby and are available to assists as needed. Patient does not want to [facility] to [facility]. They are [facility] [place]. Would benefit from a shower chair.    Anticipated Discharge Disposition (OT): home with home health, home with 24/7 care, home with assist

## 2025-02-25 NOTE — PLAN OF CARE
Goal Outcome Evaluation:  Plan of Care Reviewed With: patient        Progress: no change     Pt incontinent, turning frequently Q2hrs, assisted with reposition x2, labs this AM- will review potassium level and reorder potassium as needed for replacement protocol. Pt will work with PT in AM. Pt tolerating PO and wound care on wounds as ordered.

## 2025-02-26 LAB
BACTERIA SPEC AEROBE CULT: NORMAL
BACTERIA SPEC AEROBE CULT: NORMAL

## 2025-02-26 NOTE — PROGRESS NOTES
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               If applicable, please update the problem list.

## 2025-02-26 NOTE — PROGRESS NOTES
Enter Query Response Below      Query Response: Gram-negative pneumonia excluding haemophilus influenza Electronically signed by Judith Rhodes DO, 02/26/25, 3:13 PM EST.               If applicable, please update the problem list.

## 2025-02-26 NOTE — CASE MANAGEMENT/SOCIAL WORK
Case Management Discharge Note           Provided Post Acute Provider List?: N/A    Selected Continued Care - Discharged on 2/25/2025 Admission date: 2/21/2025 - Discharge disposition: Home or Self Care      Destination    No services have been selected for the patient.                Durable Medical Equipment    No services have been selected for the patient.                Dialysis/Infusion    No services have been selected for the patient.                Home Medical Care       Service Provider Services Address Phone Fax Patient Preferred    [facility] [address] [place], [place] [id number] [phone] --              Therapy    No services have been selected for the patient.                Community Resources    No services have been selected for the patient.                Community & DME    No services have been selected for the patient.                    Transportation Services  Private: Car  Ambulance: Other ([facility])  Other Ambulance Status: Accepted    Final Discharge Disposition Code: 06 - home with home health care

## 2025-02-26 NOTE — OUTREACH NOTE
Prep Survey      Flowsheet Row Responses   [facility]? [facility]   Is LACE score < 7 ? No   Eligibility Readm Mgmt   Discharge diagnosis Pneumonia   Does the patient have one of the following disease processes/diagnoses(primary or secondary)? Pneumonia   Does the patient have Home health ordered? Yes   What is the Home health agency?  [facility]   Is there a DME ordered? No   Prep survey completed? Yes            ZAYRA OSBORN - Registered Nurse

## 2025-02-27 NOTE — PROGRESS NOTES
Enter Query Response Below      Query Response: Sepsis was not supported Electronically signed by Judith Rhodes DO, 02/27/25, 12:45 PM EST.               If applicable, please update the problem list.

## 2025-02-27 NOTE — PROGRESS NOTES
Enter Query Response Below      Query Response: NSTEMI was not supported Electronically signed by Judith Rhodes DO, 02/27/25, 12:46 PM EST.               If applicable, please update the problem list.

## 2025-02-27 NOTE — PROGRESS NOTES
Enter Query Response Below      Query Response: Underweight Electronically signed by Judith Rhodes DO, 02/27/25, 12:45 PM EST.               If applicable, please update the problem list.

## 2025-03-03 ENCOUNTER — READMISSION MANAGEMENT (OUTPATIENT)
Dept: CALL CENTER | Facility: HOSPITAL | Age: 85
End: 2025-03-03
Payer: MEDICARE

## 2025-03-03 NOTE — OUTREACH NOTE
COPD/PN Week 1 Survey      Flowsheet Row Responses   [facility] patient discharged from? [facility]   Does the patient have one of the following disease processes/diagnoses(primary or secondary)? Pneumonia   Week 1 attempt successful? Yes   Call start time 1326   Unsuccessful attempts Attempt 1   Revoke Change in health status-moved to LTC/SNF/Hospice  [pallative]   Discharge diagnosis Pneumonia   Person spoke with today (if not patient) and relationship grandson   Meds reviewed with patient/caregiver? Yes   Is the patient having any side effects they believe may be caused by any medication additions or changes? No   Does the patient have all medications ordered at discharge? Yes   Is the patient taking all medications as directed (includes completed medication regime)? Yes   Does the patient have a primary care provider?  Yes   Does the patient have an appointment with their PCP or specialist within 7 days of discharge? Greater than 7 days   What is preventing the patient from scheduling follow up appointments within 7 days of discharge? Earlier appointment not available   Has the patient kept scheduled appointments due by today? N/A  [telehealth]   What is the Home health agency?  [facility]   Has home health visited the patient within 72 hours of discharge? Yes   Psychosocial issues? No   Did the patient receive a copy of their discharge instructions? Yes   Nursing interventions Reviewed instructions with patient   What is the patient's perception of their health status since discharge? Same   If the patient is a current smoker, are they able to teach back resources for cessation? [id number]   Is the patient/caregiver able to teach back the hierarchy of who to call/visit for symptoms/problems? PCP, Specialist, Home health nurse, Urgent Care, ED, 911 Yes            ZAYRA OSBORN - Registered Nurse

## (undated) DEVICE — 2108 SERIES SAGITTAL BLADE, NO OFFSET  (24.8 X 1.24 X 80.1MM)

## (undated) DEVICE — DRP SURG U/DRP INVISISHIELD PA 48X52IN

## (undated) DEVICE — FLEXIBLE YANKAUER,MEDIUM TIP, NO VACUUM CONTROL: Brand: ARGYLE

## (undated) DEVICE — 1000 S-DRAPE TOWEL DRAPE 10/BX: Brand: STERI-DRAPE™

## (undated) DEVICE — SYR LUERLOK 30CC

## (undated) DEVICE — NDL SPINE 18G 31/2IN PNK

## (undated) DEVICE — SLV SCD CALF HEMOFORCE DVT THERP REPROC MD

## (undated) DEVICE — GLV SURG SENSICARE PI LF PF 8 GRN STRL

## (undated) DEVICE — DRAPE,U/ SHT,SPLIT,PLAS,STERIL: Brand: MEDLINE

## (undated) DEVICE — 450 ML BOTTLE OF 0.05% CHLORHEXIDINE GLUCONATE IN 99.95% STERILE WATER FOR IRRIGATION, USP AND APPLICATOR.: Brand: IRRISEPT ANTIMICROBIAL WOUND LAVAGE

## (undated) DEVICE — SYS CLS SKIN PREMIERPRO EXOFINFUSION 22CM

## (undated) DEVICE — SUT ETHIB 5 V37 30IN MB66G

## (undated) DEVICE — PK HIP GEN 20

## (undated) DEVICE — DRSNG WND BORDR/ADHS NONADHR/GZ LF 4X10IN STRL

## (undated) DEVICE — COVER,MAYO STAND,STERILE: Brand: MEDLINE

## (undated) DEVICE — SUT MNCRYL PLS ANTIB UD 3/0 PS2 27IN

## (undated) DEVICE — STRIP,CLOSURE,WOUND,MEDI-STRIP,1/2X4: Brand: MEDLINE

## (undated) DEVICE — ANTIBACTERIAL VIOLET BRAIDED (POLYGLACTIN 910), SYNTHETIC ABSORBABLE SUTURE: Brand: COATED VICRYL

## (undated) DEVICE — DRAPE,HIP,W/POUCHES,STERILE: Brand: MEDLINE

## (undated) DEVICE — HANDPIECE SET WITH HIGH FLOW TIP AND SUCTION TUBE: Brand: INTERPULSE

## (undated) DEVICE — PENCL E/S ULTRAVAC TELESCP NOSE HOLSTR 10FT

## (undated) DEVICE — 4-PORT MANIFOLD: Brand: NEPTUNE 2

## (undated) DEVICE — GLV SURG SENSICARE W/ALOE PF LF 8 STRL

## (undated) DEVICE — ZIPPERED TOGA, X-LARGE: Brand: FLYTE

## (undated) DEVICE — PILLW ABD SM